# Patient Record
Sex: MALE | Race: WHITE | NOT HISPANIC OR LATINO | Employment: OTHER | URBAN - METROPOLITAN AREA
[De-identification: names, ages, dates, MRNs, and addresses within clinical notes are randomized per-mention and may not be internally consistent; named-entity substitution may affect disease eponyms.]

---

## 2019-03-14 ENCOUNTER — TELEPHONE (OUTPATIENT)
Dept: TRANSPLANT | Facility: CLINIC | Age: 34
End: 2019-03-14

## 2019-03-14 NOTE — TELEPHONE ENCOUNTER
"3/14/19  1200 pm:   Received the following message at 1149 today:  [facility] Staff              pt wife would like a call from Dr. Cevallos in ref togetting him an appt to see him. She says her son has the same disease as his father. [id number] please call her at [phone] Kristine     Thanks      Called Ms. Kristine Stephens  She told me her son is "suffering from the same disease my , his father had". She is asking that he be scheduled to see Dr. Cevallos.  She told me he had a heart attack in the past, had a stent put in his leg and  on the table. She said he was "shocked and brought back, and ever since has been having the same kind of heart problems my  had before he was transplanted and ".  She said most recently her son was told he needs to wear a vest for a while. Mother also stated Dr. Cevallos told her he would see her son any time needed.  She confirmed he has never been a pt at [facility] before. She gave me his name and . She told me he has Medicaid insurance.   I asked that she have some very specific records sent-did not need all of them. She then asked I call his wife, her dtr in law , Stefania because she has all of his records. She gave me the phone number for Stefania  I then called and spoke with her. She confirmed most of above-stating the "heart attack was 2018".  I asked and she told me he has been in the hospital a few times since then due to fluid; but now this is much better with medications.  She also said his cardiologist there wants hm to start wearing a life vest and if his echo improves, he will no longer have to wear it.  Asked if his Cardiologist was referring him and wife told me he said "not yet, was going to watch and see how he does for now".  However pt and she would like for him to be seen soon.    Asked her to obtain and send some specific medical records. Said she has "all of his records' asked for now, she " only have faxed: his recent echocariogram/EKG/Angiogram/ hospital D/C summary and labs. Provided her with this office phone and fax numbers as well as my name  Asked her to call me back one records have been faxed-said she would do so.  She told me local cardiologist is:  Donnell Ramos in [place].   She was also under the iimpression Dr. Ramos spoke about her  a few weeks ago. Told her I would ask Dr Cevallos about this.

## 2019-03-15 ENCOUNTER — HOSPITAL ENCOUNTER (INPATIENT)
Facility: HOSPITAL | Age: 34
LOS: 4 days | Discharge: HOME | DRG: 246 | End: 2019-03-19
Attending: EMERGENCY MEDICINE | Admitting: HOSPITALIST
Payer: MEDICAID

## 2019-03-15 DIAGNOSIS — I21.4 NSTEMI (NON-ST ELEVATED MYOCARDIAL INFARCTION): ICD-10-CM

## 2019-03-15 DIAGNOSIS — I50.43 ACUTE ON CHRONIC COMBINED SYSTOLIC AND DIASTOLIC CONGESTIVE HEART FAILURE: ICD-10-CM

## 2019-03-15 DIAGNOSIS — K21.9 GASTROESOPHAGEAL REFLUX DISEASE WITHOUT ESOPHAGITIS: ICD-10-CM

## 2019-03-15 DIAGNOSIS — I25.10 CORONARY ARTERY DISEASE INVOLVING NATIVE CORONARY ARTERY OF NATIVE HEART WITHOUT ANGINA PECTORIS: ICD-10-CM

## 2019-03-15 DIAGNOSIS — R07.9 CHEST PAIN: ICD-10-CM

## 2019-03-15 DIAGNOSIS — I50.9 CONGESTIVE HEART FAILURE, UNSPECIFIED HF CHRONICITY, UNSPECIFIED HEART FAILURE TYPE: Primary | ICD-10-CM

## 2019-03-15 DIAGNOSIS — I10 ESSENTIAL HYPERTENSION: ICD-10-CM

## 2019-03-15 LAB
ALBUMIN SERPL BCP-MCNC: 4.1 G/DL
ALP SERPL-CCNC: 82 U/L
ALT SERPL W/O P-5'-P-CCNC: 18 U/L
ANION GAP SERPL CALC-SCNC: 8 MMOL/L
APTT BLDCRRT: 24.7 SEC
ASCENDING AORTA: 3.14 CM
AST SERPL-CCNC: 14 U/L
AV INDEX (PROSTH): 0.73
AV MEAN GRADIENT: 3.45 MMHG
AV PEAK GRADIENT: 5.2 MMHG
AV VALVE AREA: 3.4 CM2
AV VELOCITY RATIO: 0.72
BASOPHILS # BLD AUTO: 0.09 K/UL
BASOPHILS NFR BLD: 1.2 %
BILIRUB SERPL-MCNC: 0.6 MG/DL
BNP SERPL-MCNC: 31 PG/ML
BSA FOR ECHO PROCEDURE: 2.04 M2
BUN SERPL-MCNC: 20 MG/DL
CALCIUM SERPL-MCNC: 9.6 MG/DL
CHLORIDE SERPL-SCNC: 104 MMOL/L
CHOLEST SERPL-MCNC: 124 MG/DL
CHOLEST/HDLC SERPL: 3.4 {RATIO}
CO2 SERPL-SCNC: 29 MMOL/L
CREAT SERPL-MCNC: 1.2 MG/DL
CV ECHO LV RWT: 0.35 CM
DIFFERENTIAL METHOD: NORMAL
DOP CALC AO PEAK VEL: 1.14 M/S
DOP CALC AO VTI: 19.51 CM
DOP CALC LVOT AREA: 4.64 CM2
DOP CALC LVOT DIAMETER: 2.43 CM
DOP CALC LVOT PEAK VEL: 0.83 M/S
DOP CALC LVOT STROKE VOLUME: 66.38 CM3
DOP CALCLVOT PEAK VEL VTI: 14.32 CM
E WAVE DECELERATION TIME: 146.49 MSEC
E/A RATIO: 0.78
E/E' RATIO: 4.67
ECHO LV POSTERIOR WALL: 0.9 CM (ref 0.6–1.1)
EOSINOPHIL # BLD AUTO: 0.5 K/UL
EOSINOPHIL NFR BLD: 6.1 %
ERYTHROCYTE [DISTWIDTH] IN BLOOD BY AUTOMATED COUNT: 11.9 %
EST. GFR  (AFRICAN AMERICAN): >60 ML/MIN/1.73 M^2
EST. GFR  (NON AFRICAN AMERICAN): >60 ML/MIN/1.73 M^2
ESTIMATED AVG GLUCOSE: 100 MG/DL
FRACTIONAL SHORTENING: 20 % (ref 28–44)
GLUCOSE SERPL-MCNC: 82 MG/DL
HBA1C MFR BLD HPLC: 5.1 %
HCT VFR BLD AUTO: 41.8 %
HDLC SERPL-MCNC: 36 MG/DL
HDLC SERPL: 29 %
HGB BLD-MCNC: 14.3 G/DL
IMM GRANULOCYTES # BLD AUTO: 0.02 K/UL
IMM GRANULOCYTES NFR BLD AUTO: 0.3 %
INTERVENTRICULAR SEPTUM: 0.91 CM (ref 0.6–1.1)
LA MAJOR: 4.24 CM
LA MINOR: 3.79 CM
LA WIDTH: 3.63 CM
LDLC SERPL CALC-MCNC: 45.8 MG/DL
LEFT ATRIUM SIZE: 3.79 CM
LEFT ATRIUM VOLUME INDEX: 23.1 ML/M2
LEFT ATRIUM VOLUME: 46.8 CM3
LEFT INTERNAL DIMENSION IN SYSTOLE: 4.11 CM (ref 2.1–4)
LEFT VENTRICLE DIASTOLIC VOLUME INDEX: 61.32 ML/M2
LEFT VENTRICLE DIASTOLIC VOLUME: 124.11 ML
LEFT VENTRICLE MASS INDEX: 81.7 G/M2
LEFT VENTRICLE SYSTOLIC VOLUME INDEX: 36.9 ML/M2
LEFT VENTRICLE SYSTOLIC VOLUME: 74.67 ML
LEFT VENTRICULAR INTERNAL DIMENSION IN DIASTOLE: 5.11 CM (ref 3.5–6)
LEFT VENTRICULAR MASS: 165.29 G
LV LATERAL E/E' RATIO: 3.5
LV SEPTAL E/E' RATIO: 7
LYMPHOCYTES # BLD AUTO: 2.3 K/UL
LYMPHOCYTES NFR BLD: 30.7 %
MAGNESIUM SERPL-MCNC: 2.2 MG/DL
MCH RBC QN AUTO: 30.4 PG
MCHC RBC AUTO-ENTMCNC: 34.2 G/DL
MCV RBC AUTO: 89 FL
MONOCYTES # BLD AUTO: 0.6 K/UL
MONOCYTES NFR BLD: 7.4 %
MV PEAK A VEL: 0.63 M/S
MV PEAK E VEL: 0.49 M/S
NEUTROPHILS # BLD AUTO: 4.1 K/UL
NEUTROPHILS NFR BLD: 54.3 %
NONHDLC SERPL-MCNC: 88 MG/DL
NRBC BLD-RTO: 0 /100 WBC
PISA TR MAX VEL: 1.97 M/S
PLATELET # BLD AUTO: 177 K/UL
PMV BLD AUTO: 10.4 FL
POTASSIUM SERPL-SCNC: 3.9 MMOL/L
PROT SERPL-MCNC: 7.2 G/DL
PULM VEIN S/D RATIO: 1.61
PV PEAK D VEL: 0.28 M/S
PV PEAK S VEL: 0.45 M/S
RA MAJOR: 3.73 CM
RA PRESSURE: 3 MMHG
RA WIDTH: 3.25 CM
RBC # BLD AUTO: 4.7 M/UL
RIGHT VENTRICULAR END-DIASTOLIC DIMENSION: 2.52 CM
RV TISSUE DOPPLER FREE WALL SYSTOLIC VELOCITY 1 (APICAL 4 CHAMBER VIEW): 10.73 M/S
SINUS: 3.05 CM
SODIUM SERPL-SCNC: 141 MMOL/L
STJ: 3.27 CM
TDI LATERAL: 0.14
TDI SEPTAL: 0.07
TDI: 0.11
TR MAX PG: 15.52 MMHG
TRICUSPID ANNULAR PLANE SYSTOLIC EXCURSION: 1.71 CM
TRIGL SERPL-MCNC: 211 MG/DL
TROPONIN I SERPL DL<=0.01 NG/ML-MCNC: 0.01 NG/ML
TROPONIN I SERPL DL<=0.01 NG/ML-MCNC: 0.02 NG/ML
TROPONIN I SERPL DL<=0.01 NG/ML-MCNC: 0.05 NG/ML
TV REST PULMONARY ARTERY PRESSURE: 19 MMHG
WBC # BLD AUTO: 7.59 K/UL

## 2019-03-15 PROCEDURE — 99285 EMERGENCY DEPT VISIT HI MDM: CPT | Mod: ,,, | Performed by: NURSE PRACTITIONER

## 2019-03-15 PROCEDURE — 84484 ASSAY OF TROPONIN QUANT: CPT

## 2019-03-15 PROCEDURE — 99220 PR INITIAL OBSERVATION CARE,LEVL III: CPT | Mod: ,,, | Performed by: NURSE PRACTITIONER

## 2019-03-15 PROCEDURE — 99285 EMERGENCY DEPT VISIT HI MDM: CPT | Mod: 25

## 2019-03-15 PROCEDURE — 36415 COLL VENOUS BLD VENIPUNCTURE: CPT

## 2019-03-15 PROCEDURE — 93005 ELECTROCARDIOGRAM TRACING: CPT

## 2019-03-15 PROCEDURE — 11000001 HC ACUTE MED/SURG PRIVATE ROOM

## 2019-03-15 PROCEDURE — 80061 LIPID PANEL: CPT

## 2019-03-15 PROCEDURE — 96360 HYDRATION IV INFUSION INIT: CPT

## 2019-03-15 PROCEDURE — 93010 ELECTROCARDIOGRAM REPORT: CPT | Mod: ,,, | Performed by: INTERNAL MEDICINE

## 2019-03-15 PROCEDURE — 80053 COMPREHEN METABOLIC PANEL: CPT

## 2019-03-15 PROCEDURE — 85025 COMPLETE CBC W/AUTO DIFF WBC: CPT

## 2019-03-15 PROCEDURE — 25000003 PHARM REV CODE 250: Performed by: NURSE PRACTITIONER

## 2019-03-15 PROCEDURE — 96361 HYDRATE IV INFUSION ADD-ON: CPT

## 2019-03-15 PROCEDURE — 99220 PR INITIAL OBSERVATION CARE,LEVL III: ICD-10-PCS | Mod: ,,, | Performed by: NURSE PRACTITIONER

## 2019-03-15 PROCEDURE — 63600175 PHARM REV CODE 636 W HCPCS: Performed by: NURSE PRACTITIONER

## 2019-03-15 PROCEDURE — 85730 THROMBOPLASTIN TIME PARTIAL: CPT

## 2019-03-15 PROCEDURE — 83880 ASSAY OF NATRIURETIC PEPTIDE: CPT

## 2019-03-15 PROCEDURE — 83036 HEMOGLOBIN GLYCOSYLATED A1C: CPT

## 2019-03-15 PROCEDURE — 84484 ASSAY OF TROPONIN QUANT: CPT | Mod: 91

## 2019-03-15 PROCEDURE — 83735 ASSAY OF MAGNESIUM: CPT

## 2019-03-15 PROCEDURE — 99285 PR EMERGENCY DEPT VISIT,LEVEL V: ICD-10-PCS | Mod: ,,, | Performed by: NURSE PRACTITIONER

## 2019-03-15 PROCEDURE — 93010 EKG 12-LEAD: ICD-10-PCS | Mod: ,,, | Performed by: INTERNAL MEDICINE

## 2019-03-15 RX ORDER — CETIRIZINE HYDROCHLORIDE 5 MG/1
10 TABLET ORAL DAILY
Status: DISCONTINUED | OUTPATIENT
Start: 2019-03-16 | End: 2019-03-19 | Stop reason: HOSPADM

## 2019-03-15 RX ORDER — PRASUGREL 10 MG/1
60 TABLET, FILM COATED ORAL ONCE
Status: COMPLETED | OUTPATIENT
Start: 2019-03-15 | End: 2019-03-15

## 2019-03-15 RX ORDER — BUMETANIDE 1 MG/1
2 TABLET ORAL DAILY
Status: DISCONTINUED | OUTPATIENT
Start: 2019-03-16 | End: 2019-03-19 | Stop reason: HOSPADM

## 2019-03-15 RX ORDER — ASPIRIN 325 MG
325 TABLET ORAL DAILY
Status: DISCONTINUED | OUTPATIENT
Start: 2019-03-16 | End: 2019-03-19 | Stop reason: HOSPADM

## 2019-03-15 RX ORDER — ASPIRIN 325 MG
325 TABLET ORAL DAILY
COMMUNITY
End: 2019-03-20 | Stop reason: ALTCHOICE

## 2019-03-15 RX ORDER — POLYETHYLENE GLYCOL 3350 17 G/17G
17 POWDER, FOR SOLUTION ORAL 2 TIMES DAILY PRN
Status: DISCONTINUED | OUTPATIENT
Start: 2019-03-15 | End: 2019-03-19 | Stop reason: HOSPADM

## 2019-03-15 RX ORDER — SODIUM CHLORIDE 0.9 % (FLUSH) 0.9 %
3 SYRINGE (ML) INJECTION EVERY 8 HOURS
Status: DISCONTINUED | OUTPATIENT
Start: 2019-03-15 | End: 2019-03-19 | Stop reason: HOSPADM

## 2019-03-15 RX ORDER — BUMETANIDE 2 MG/1
2 TABLET ORAL
COMMUNITY
End: 2019-05-08 | Stop reason: SDUPTHER

## 2019-03-15 RX ORDER — PRASUGREL 10 MG/1
10 TABLET, FILM COATED ORAL DAILY
Status: DISCONTINUED | OUTPATIENT
Start: 2019-03-16 | End: 2019-03-19 | Stop reason: HOSPADM

## 2019-03-15 RX ORDER — NITROGLYCERIN 0.4 MG/1
0.4 TABLET SUBLINGUAL EVERY 5 MIN PRN
Status: DISCONTINUED | OUTPATIENT
Start: 2019-03-15 | End: 2019-03-19 | Stop reason: HOSPADM

## 2019-03-15 RX ORDER — PANTOPRAZOLE SODIUM 20 MG/1
20 TABLET, DELAYED RELEASE ORAL DAILY
COMMUNITY
End: 2020-06-02

## 2019-03-15 RX ORDER — ACETAMINOPHEN 325 MG/1
650 TABLET ORAL EVERY 4 HOURS PRN
Status: DISCONTINUED | OUTPATIENT
Start: 2019-03-15 | End: 2019-03-19 | Stop reason: HOSPADM

## 2019-03-15 RX ORDER — ASPIRIN 325 MG
325 TABLET ORAL
Status: DISCONTINUED | OUTPATIENT
Start: 2019-03-15 | End: 2019-03-15

## 2019-03-15 RX ORDER — SODIUM CHLORIDE 0.9 % (FLUSH) 0.9 %
5 SYRINGE (ML) INJECTION
Status: DISCONTINUED | OUTPATIENT
Start: 2019-03-15 | End: 2019-03-19 | Stop reason: HOSPADM

## 2019-03-15 RX ORDER — ATORVASTATIN CALCIUM 20 MG/1
40 TABLET, FILM COATED ORAL DAILY
Status: DISCONTINUED | OUTPATIENT
Start: 2019-03-16 | End: 2019-03-19 | Stop reason: HOSPADM

## 2019-03-15 RX ORDER — RAMELTEON 8 MG/1
8 TABLET ORAL NIGHTLY PRN
Status: DISCONTINUED | OUTPATIENT
Start: 2019-03-15 | End: 2019-03-19 | Stop reason: HOSPADM

## 2019-03-15 RX ORDER — CARVEDILOL 3.12 MG/1
3.12 TABLET ORAL 2 TIMES DAILY WITH MEALS
COMMUNITY
End: 2019-03-20 | Stop reason: ALTCHOICE

## 2019-03-15 RX ORDER — ATORVASTATIN CALCIUM 40 MG/1
40 TABLET, FILM COATED ORAL DAILY
COMMUNITY
End: 2019-03-20 | Stop reason: SDUPTHER

## 2019-03-15 RX ORDER — ONDANSETRON 2 MG/ML
4 INJECTION INTRAMUSCULAR; INTRAVENOUS EVERY 8 HOURS PRN
Status: DISCONTINUED | OUTPATIENT
Start: 2019-03-15 | End: 2019-03-19 | Stop reason: HOSPADM

## 2019-03-15 RX ORDER — HEPARIN SODIUM,PORCINE/D5W 25000/250
12 INTRAVENOUS SOLUTION INTRAVENOUS CONTINUOUS
Status: DISCONTINUED | OUTPATIENT
Start: 2019-03-15 | End: 2019-03-18

## 2019-03-15 RX ORDER — TRAMADOL HYDROCHLORIDE 50 MG/1
50 TABLET ORAL EVERY 6 HOURS PRN
Status: DISCONTINUED | OUTPATIENT
Start: 2019-03-15 | End: 2019-03-19 | Stop reason: HOSPADM

## 2019-03-15 RX ORDER — CETIRIZINE HYDROCHLORIDE 10 MG/1
10 TABLET ORAL DAILY
COMMUNITY

## 2019-03-15 RX ORDER — CARVEDILOL 3.12 MG/1
3.12 TABLET ORAL 2 TIMES DAILY WITH MEALS
Status: DISCONTINUED | OUTPATIENT
Start: 2019-03-15 | End: 2019-03-19 | Stop reason: HOSPADM

## 2019-03-15 RX ORDER — PANTOPRAZOLE SODIUM 20 MG/1
20 TABLET, DELAYED RELEASE ORAL DAILY
Status: DISCONTINUED | OUTPATIENT
Start: 2019-03-16 | End: 2019-03-19 | Stop reason: HOSPADM

## 2019-03-15 RX ORDER — BISACODYL 10 MG
10 SUPPOSITORY, RECTAL RECTAL DAILY PRN
Status: DISCONTINUED | OUTPATIENT
Start: 2019-03-15 | End: 2019-03-19 | Stop reason: HOSPADM

## 2019-03-15 RX ORDER — AMOXICILLIN 250 MG
1 CAPSULE ORAL 2 TIMES DAILY
Status: DISCONTINUED | OUTPATIENT
Start: 2019-03-15 | End: 2019-03-19 | Stop reason: HOSPADM

## 2019-03-15 RX ORDER — ONDANSETRON 8 MG/1
8 TABLET, ORALLY DISINTEGRATING ORAL EVERY 8 HOURS PRN
Status: DISCONTINUED | OUTPATIENT
Start: 2019-03-15 | End: 2019-03-19 | Stop reason: HOSPADM

## 2019-03-15 RX ORDER — PRASUGREL 10 MG/1
10 TABLET, FILM COATED ORAL DAILY
COMMUNITY
End: 2022-01-07

## 2019-03-15 RX ADMIN — PRASUGREL 60 MG: 10 TABLET, FILM COATED ORAL at 05:03

## 2019-03-15 RX ADMIN — SACUBITRIL AND VALSARTAN 1 TABLET: 49; 51 TABLET, FILM COATED ORAL at 08:03

## 2019-03-15 RX ADMIN — CARVEDILOL 3.12 MG: 3.12 TABLET, FILM COATED ORAL at 05:03

## 2019-03-15 RX ADMIN — SODIUM CHLORIDE 500 ML: 0.9 INJECTION, SOLUTION INTRAVENOUS at 12:03

## 2019-03-15 RX ADMIN — STANDARDIZED SENNA CONCENTRATE AND DOCUSATE SODIUM 1 TABLET: 8.6; 5 TABLET, FILM COATED ORAL at 08:03

## 2019-03-15 RX ADMIN — HEPARIN SODIUM AND DEXTROSE 12 UNITS/KG/HR: 10000; 5 INJECTION INTRAVENOUS at 06:03

## 2019-03-15 NOTE — HPI
"Mr. Stephens is a 33 year old male with past medical history of CHF (on Bumex), CAD, GERD, hypertension and MI (12/30/18) who presented to the Emergency Department for chest pain, he reports pain started last night while driving and felt like a pressure in his chest; stating "like someone was sitting on me".  He denied radiation of the pain and reports it can be as bad as 10/10 but at time of presentation is 5/10. He notes that is anginal equivalent is crushing chest pain substantially more severe than what he reports today. He denies taking any medications for the pain. He denies any headache, shortness of breath, fever or chills.  Pt states he is compliant with all medications and took his home  mg PTA. He reports prior MI in December was treated with one "right sided stent" which was placed at [facility]. He is independent in ADLs, employed, and lives outside of [place]. He denies tobacco or illicit drug use, social ETOH. He has a strong family history of cardiac disease, father had severe CHF and required heart transplant. Mr. Stephens reports his mother began reaching out on his behalf to be see in [facility] yesterday, leaving a message with their staff, however has not heard back about the referral as of yet. All past medical, social, and family history reviewed.     In the ED, CBC and chemistry unremarkable, BNP WNL, no evidence of volume overload on physical exam, troponin negative, CXR without acute findings, and EKG with NSR, left axis deviation, and inferolateral infarct (no priors for comparison). The patient was placed in observation to the Hospital Medicine Service for further evaluation and treatment.   "

## 2019-03-15 NOTE — ED NOTES
Patient transported on in wheelchair to room OBS 06A via escort with all telemetry, family and belongings. Patient AAOx4, VSS,  no acute distress reported or observed at time of departure from ED.

## 2019-03-15 NOTE — H&P
"[facility] Hospital Medicine  History & Physical    Patient Name: Juan Stephens  MRN: [id number]  Admission Date: 3/15/2019  Attending Physician: Get Arellano, *   Primary Care Provider: Sanjuanita Smith MD    [facility] Medicine Team: [facility]OS[facility] MED MARY JO Escamilla NP     Patient information was obtained from patient, relative(s), past medical records and ER records.     Subjective:     Principal Problem:Chest pain    Chief Complaint:   Chief Complaint   Patient presents with    Chest Pain     hx stents        HPI: Mr. Stephens is a 33 year old male with past medical history of CHF (on Bumex), CAD, GERD, hypertension and MI (12/30/18) who presented to the Emergency Department for chest pain, he reports pain started last night while driving and felt like a pressure in his chest; stating "like someone was sitting on me".  He denied radiation of the pain and reports it can be as bad as 10/10 but at time of presentation is 5/10. He notes that is anginal equivalent is crushing chest pain substantially more severe than what he reports today. He denies taking any medications for the pain. He denies any headache, shortness of breath, fever or chills.  Pt states he is compliant with all medications and took his home  mg PTA. He reports prior MI in December was treated with one "right sided stent" which was placed at [facility]. He is independent in ADLs, employed, and lives outside of [place]. He denies tobacco or illicit drug use, social ETOH. He has a strong family history of cardiac disease, father had severe CHF and required heart transplant. Mr. Stephens reports his mother began reaching out on his behalf to be see in [facility] clinic yesterday, leaving a message with their staff, however has not heard back about the referral as of yet. All past medical, social, and family history reviewed.     In the ED, CBC and chemistry unremarkable, BNP WNL, no evidence of " volume overload on physical exam, troponin negative, CXR without acute findings, and EKG with NSR, left axis deviation, and inferolateral infarct (no priors for comparison). The patient was placed in observation to the Hospital Medicine Service for further evaluation and treatment.     Past Medical History:   Diagnosis Date    CHF (congestive heart failure)     Coronary artery disease     GERD (gastroesophageal reflux disease)     Hypertension        Past Surgical History:   Procedure Laterality Date    HERNIA REPAIR         Review of patient's allergies indicates:  No Known Allergies    No current facility-administered medications on file prior to encounter.      Current Outpatient Medications on File Prior to Encounter   Medication Sig    aspirin 325 MG tablet Take 325 mg by mouth once daily.    atorvastatin (LIPITOR) 40 MG tablet Take 40 mg by mouth once daily.    bumetanide (BUMEX) 2 MG tablet Take 2 mg by mouth once daily.    carvedilol (COREG) 3.125 MG tablet Take 3.125 mg by mouth 2 (two) times daily with meals.    cetirizine (ZYRTEC) 10 MG tablet Take 10 mg by mouth once daily.    pantoprazole (PROTONIX) 20 MG tablet Take 20 mg by mouth once daily.    prasugrel (EFFIENT) 10 mg Tab Take 10 mg by mouth once daily.    sacubitril-valsartan (ENTRESTO) 49-51 mg per tablet Take 1 tablet by mouth 2 (two) times daily.     Family History     Problem Relation (Age of Onset)    Heart failure Father        Tobacco Use    Smoking status: Former Smoker     Types: Cigarettes     Last attempt to quit: 2018     Years since quittin.2    Smokeless tobacco: Current User     Types: Snuff   Substance and Sexual Activity    Alcohol use: Not on file     Comment: occasionally    Drug use: No    Sexual activity: Yes     Partners: Female     Review of Systems   Constitutional: Negative for activity change, appetite change, chills, diaphoresis, fatigue, fever and unexpected weight change.   HENT: Negative for  congestion, sinus pressure, sinus pain, sneezing, sore throat, trouble swallowing and voice change.    Respiratory: Positive for shortness of breath (now resolved). Negative for cough, chest tightness, wheezing and stridor.    Cardiovascular: Positive for chest pain (now reoslved ).   Gastrointestinal: Negative for abdominal distention, abdominal pain, constipation, diarrhea, nausea and vomiting.   Genitourinary: Negative for decreased urine volume, difficulty urinating, hematuria and urgency.   Musculoskeletal: Negative for arthralgias, back pain, gait problem, joint swelling, myalgias and neck pain.   Skin: Negative for color change, pallor, rash and wound.   Neurological: Negative for dizziness, syncope, weakness and light-headedness.     Objective:     Vital Signs (Most Recent):  Temp: 99 °F (37.2 °C) (03/15/19 1615)  Pulse: 87 (03/15/19 1615)  Resp: 18 (03/15/19 1615)  BP: 103/61 (03/15/19 1615)  SpO2: 99 % (03/15/19 1415) Vital Signs (24h Range):  Temp:  [98.1 °F (36.7 °C)-99 °F (37.2 °C)] 99 °F (37.2 °C)  Pulse:  [] 87  Resp:  [18-20] 18  SpO2:  [97 %-99 %] 99 %  BP: ()/(60-71) 103/61     Weight: 88.9 kg (196 lb)  Body mass index is 28.12 kg/m².    Physical Exam   Constitutional: He is oriented to person, place, and time. He appears well-developed and well-nourished. No distress.   HENT:   Head: Normocephalic and atraumatic.   Mouth/Throat: Mucous membranes are normal. Normal dentition. No oropharyngeal exudate.   Eyes: Conjunctivae and lids are normal. Pupils are equal, round, and reactive to light. No scleral icterus.   Neck: Normal range of motion. Neck supple. No JVD present.   Cardiovascular: Normal rate, regular rhythm, normal heart sounds and intact distal pulses. Exam reveals no gallop and no friction rub.   No murmur heard.  Pulmonary/Chest: Effort normal and breath sounds normal. No respiratory distress. He has no wheezes. He exhibits no tenderness.   Abdominal: Soft. Bowel sounds are  normal. He exhibits no distension. There is no tenderness.   Musculoskeletal: Normal range of motion. He exhibits no edema, tenderness or deformity.   Neurological: He is alert and oriented to person, place, and time. No cranial nerve deficit.   Skin: Skin is warm and dry. Capillary refill takes less than 2 seconds. No rash noted. He is not diaphoretic. No erythema.   Psychiatric: He has a normal mood and affect. Judgment and thought content normal.   Nursing note and vitals reviewed.        CRANIAL NERVES     CN III, IV, VI   Pupils are equal, round, and reactive to light.    Significant Labs:   CBC:   Recent Labs   Lab 03/15/19  1224   WBC 7.59   HGB 14.3   HCT 41.8        CMP:   Recent Labs   Lab 03/15/19  1224      K 3.9      CO2 29   GLU 82   BUN 20   CREATININE 1.2   CALCIUM 9.6   PROT 7.2   ALBUMIN 4.1   BILITOT 0.6   ALKPHOS 82   AST 14   ALT 18   ANIONGAP 8   EGFRNONAA >60.0     Cardiac Markers:   Recent Labs   Lab 03/15/19  1224   BNP 31     Magnesium:   Recent Labs   Lab 03/15/19  1224   MG 2.2     Troponin:   Recent Labs   Lab 03/15/19  1224 03/15/19  1543   TROPONINI 0.020 0.050*     All pertinent labs within the past 24 hours have been reviewed.    Significant Imaging: I have reviewed all pertinent imaging results/findings within the past 24 hours.    Assessment/Plan:     * Chest pain    -placed in observation 3/15 due to chest pain  -in the ED, CBC and chemistry unremarkable, BNP WNL, no evidence of volume overload on physical exam, troponin negative, CXR without acute findings, and EKG with NSR, left axis deviation, and inferolateral infarct (no priors for comparison)  -chest pain free at current  -troponin trend 0.020 >> 0.050   -discussed with ED Cards fellow >> ACS protocol initiated  -continue ASA, statin, carvedilol, and prasugrel (loaded) with heparin gtt  -TTE pending  -continue tele monitoring  -EKG and SL NTG PRN chest pain  -cardiac diet in house  -Cardiac Rehab referral    -outpt follow up with Cardiology at [place]     Coronary artery disease involving native coronary artery of native heart without angina pectoris    -see above and HPI     Acute on chronic congestive heart failure    -euvolemic on exam  -CXR and BNP unremarkable  -continue home bumex, carvedilol, and entresto  -continue tele monitoring  -cardiac diet   -strict I&Os and daily weights     Essential hypertension    -BP controlled, continue medications as above  -monitor      GERD (gastroesophageal reflux disease)    -no acute issues, chronic  -continue home PPI       VTE Risk Mitigation (From admission, onward)        Ordered     heparin 25,000 units in dextrose 5% (100 units/ml) IV bolus from bag INITIAL BOLUS (max bolus 4000 units)  Once      03/15/19 1642     heparin 25,000 units in dextrose 5% 250 mL (100 units/mL) infusion LOW INTENSITY nomogram - [facility]  Continuous      03/15/19 1642     heparin 25,000 units in dextrose 5% (100 units/ml) IV bolus from bag - ADDITIONAL PRN BOLUS - 60 units/kg (max bolus 4000 units)  As needed (PRN)      03/15/19 1642     heparin 25,000 units in dextrose 5% (100 units/ml) IV bolus from bag - ADDITIONAL PRN BOLUS - 30 units/kg (max bolus 4000 units)  As needed (PRN)      03/15/19 1642     IP VTE HIGH RISK PATIENT  Once      03/15/19 1525     Place sequential compression device  Until discontinued      03/15/19 1525     Reason for No Pharmacological VTE Prophylaxis  Once      03/15/19 1525          Dolores Escamilla, DNP, AG-ACNP, BC  Department of Hospital Medicine  [facility]  Pager [phone]  [facility] [phone]

## 2019-03-15 NOTE — Clinical Note
Catheter  proximal first diagonal artery. The vessel(s) were injected and visualized post intervention.

## 2019-03-15 NOTE — Clinical Note
160 ml injected throughout the case. 40 mL total wasted during the case. 200 mL total used in the case.

## 2019-03-15 NOTE — CONSULTS
[facility]  Cardiology  Consult Note    Patient Name: Juan Stephens  MRN: [id number]  Admission Date: 3/15/2019  Hospital Length of Stay: 0 days  Code Status: Full Code   Attending Provider: Get Arellano, *   Consulting Provider: Michelle Astudillo MD  Primary Care Physician: Sanjuanita Smith MD  Principal Problem:Chest pain    Patient information was obtained from patient and ER records.     Consults  Subjective:     Chief Complaint:  Chest pain     HPI:   33 year old male here for chest pain.   Patient has PMH significant for HTN, HLD, and recent inferior MI S/P PCI 12/2018 at [place] and ischemic CMP.   Patient presenting to the ER with chest pain episode yesterday while driving, lasted for 3 hours, didn't try NTG. Reported chest pain is different from the chest pain he had with his prior MI.   Patient father is a heart transplant recepient [facility] at [facility], was told by his mother to come to [facility] to come for evaluation by Dr Cevallos, however he never saw Dr Cevallos prior.  In the ER he is chest pain free.  Troponin negative, EKG with inferior Q waves.       Past Medical History:   Diagnosis Date    CHF (congestive heart failure)     Coronary artery disease     GERD (gastroesophageal reflux disease)     Hypertension        Past Surgical History:   Procedure Laterality Date    HERNIA REPAIR         Review of patient's allergies indicates:  No Known Allergies    No current facility-administered medications on file prior to encounter.      Current Outpatient Medications on File Prior to Encounter   Medication Sig    aspirin 325 MG tablet Take 325 mg by mouth once daily.    atorvastatin (LIPITOR) 40 MG tablet Take 40 mg by mouth once daily.    bumetanide (BUMEX) 2 MG tablet Take 2 mg by mouth once daily.    carvedilol (COREG) 3.125 MG tablet Take 3.125 mg by mouth 2 (two) times daily with meals.    cetirizine (ZYRTEC) 10 MG tablet Take 10 mg by mouth once daily.     pantoprazole (PROTONIX) 20 MG tablet Take 20 mg by mouth once daily.    prasugrel (EFFIENT) 10 mg Tab Take 10 mg by mouth once daily.    sacubitril-valsartan (ENTRESTO) 49-51 mg per tablet Take 1 tablet by mouth 2 (two) times daily.     Family History     Problem Relation (Age of Onset)    Heart failure Father        Tobacco Use    Smoking status: Former Smoker     Types: Cigarettes     Last attempt to quit: 2018     Years since quittin.2    Smokeless tobacco: Current User     Types: Snuff   Substance and Sexual Activity    Alcohol use: Not on file     Comment: occasionally    Drug use: No    Sexual activity: Yes     Partners: Female     Review of Systems   Constitution: Negative for decreased appetite, weight gain and weight loss.   HENT: Negative.    Eyes: Negative.    Cardiovascular: Positive for chest pain. Negative for dyspnea on exertion, irregular heartbeat, orthopnea, paroxysmal nocturnal dyspnea and syncope.   Respiratory: Negative.  Negative for cough.    Gastrointestinal: Negative for bloating, melena, nausea and vomiting.   Genitourinary: Negative.    Neurological: Negative.      Objective:     Vital Signs (Most Recent):  Temp: 98.1 °F (36.7 °C) (03/15/19 1123)  Pulse: 86 (03/15/19 1415)  Resp: 20 (03/15/19 1415)  BP: 107/68 (03/15/19 1415)  SpO2: 99 % (03/15/19 1415) Vital Signs (24h Range):  Temp:  [98.1 °F (36.7 °C)] 98.1 °F (36.7 °C)  Pulse:  [] 86  Resp:  [18-20] 20  SpO2:  [97 %-99 %] 99 %  BP: ()/(60-71) 107/68     Weight: 88.9 kg (196 lb)  Body mass index is 28.12 kg/m².    SpO2: 99 %         Intake/Output Summary (Last 24 hours) at 3/15/2019 1615  Last data filed at 3/15/2019 1432  Gross per 24 hour   Intake 500 ml   Output 1900 ml   Net -1400 ml       Lines/Drains/Airways     Peripheral Intravenous Line                 Peripheral IV - Single Lumen 03/15/19 1213 Left Forearm less than 1 day                Physical Exam   Constitutional: He appears well-developed and  well-nourished. No distress.   HENT:   Head: Normocephalic.   Eyes: Pupils are equal, round, and reactive to light.   Neck: Normal range of motion. Neck supple. No JVD present.   Cardiovascular: Normal rate, regular rhythm, normal heart sounds and intact distal pulses. Exam reveals no gallop and no friction rub.   No murmur heard.  Pulmonary/Chest: Effort normal and breath sounds normal. He has no rales.   Abdominal: Soft. Bowel sounds are normal.   Musculoskeletal: Normal range of motion. He exhibits no edema.   Neurological: He is alert.   Skin: Skin is warm. He is not diaphoretic.       Assessment and Plan:     * Chest pain    -Episode of chest pain lasted for 3 hours yesterday while driving, and resolved spontaneously.   -Chest pain free in the ER.   -Troponin negative, EKG with old inferior infarct.     Recommendations:  -Place in observation.   -Trend troponin and EKG.  -Stress test prior to discharge, ideally nuclear stress test however won't be able to preform nuclear stress test over the weekend.   -Continue with ASA, Prasugrel, Statins.   -Continue with heart failure medications. He is well compensated.          VTE Risk Mitigation (From admission, onward)        Ordered     IP VTE HIGH RISK PATIENT  Once      03/15/19 1525     Place sequential compression device  Until discontinued      03/15/19 1525     Reason for No Pharmacological VTE Prophylaxis  Once      03/15/19 1525          Thank you for your consult. I will sign off. Please contact us if you have any additional questions.    Michelle Astudillo MD  Cardiology   [facility]

## 2019-03-15 NOTE — HPI
33 year old male here for chest pain.   Patient has PMH significant for HTN, HLD, and recent inferior MI S/P PCI 12/2018 at [place] and ischemic CMP.   Patient presenting to the ER with chest pain episode yesterday while driving, lasted for 3 hours, didn't try NTG. Reported chest pain is different from the chest pain he had with his prior MI.   Patient father is a heart transplant recepient here at [facility], was told by his mother to come to [facility] to come for evaluation by Dr Cevallos, however he never saw Dr Cevallos prior.  In the ER he is chest pain free.  Troponin negative, EKG with inferior Q waves.

## 2019-03-15 NOTE — ED NOTES
Hourly rounding performed at this time. Family at bedside.  Patient is in bed awake and alert, resting. No pain, acute distress or discomfort reported or observed. Patient able to reposition independently and urinal at bedside for toileting.  Discussed care plan, patient denies any additional needs at this time and has no complaints or questions. Room assessed for safety measures and cleanliness, no action needed at this time. The bed is in low, locked position with side rails up x 2. Personal belongings and call light in reach. Patient is oriented to call light use. Patient verbalizes will call nurse if assistance is needed.

## 2019-03-15 NOTE — ED TRIAGE NOTES
Juan Stephens, a 33 y.o. male presents to the ED w/ complaint of CP. Hx CHF and stent placement  Triage note:  Chief Complaint   Patient presents with    Chest Pain     hx stents     Review of patient's allergies indicates:  No Known Allergies  Past Medical History:   Diagnosis Date    CHF (congestive heart failure)     Coronary artery disease     GERD (gastroesophageal reflux disease)     Hypertension      Patient's name and date of birth checked and is correct.    Family is present     Pain: denies     LOC: The patient is awake, alert, and oriented to person, place, time, situation. Aware of environment with an appropriate affect. Speech is appropriate and clear.      APPEARANCE: Patient appears clean, well groomed, with clothing appropriate to environment.    Psychosocial:  Patient is calm and cooperative.  Patients insight and judgement are appropriate to situation. No evidence of delusions, hallucinations, or psychosis.     SKIN: The skin is clean, warm, dry, intact and color consistent with ethnicity. Patient has normal skin turgor and moist mucus membranes. No open areas, skin breakdown or bruising noted. Capillary refill < 3 seconds.      MUSCULOSKELETAL: Patient moving upper and lower extremities without difficulty. No obvious swelling, weakness or deformities observed or reported.     RESPIRATORY: Airway is open. Respirations spontaneous, even, and non-labored, with normal effort and rate. No accessory muscle use observed. Denies cough.  BS clear     CARDIAC:  Reports chest pain is more of a pressure now 5/10. Patient has a normal rate and rhythm, BLE periphreal edema observed, peripheral pulses 2+, capillary refill < 3 seconds.    ABDOMEN: Soft, non-tender and no distention noted.      NEUROLOGIC: Eyes open spontaneously.  Behavior appropriate to situation.  Follows commands; facial expression symmetrical.  Purposeful motor response noted; normal sensation in all extremities when touched with a  finger.      URINARY:  Patient reports routine urination without pain, frequency, or urgency.  Voids independently.

## 2019-03-15 NOTE — ASSESSMENT & PLAN NOTE
-Episode of chest pain lasted for 3 hours yesterday while driving, and resolved spontaneously.   -Chest pain free in the ER.   -Troponin negative, EKG with old inferior infarct.     Recommendations:  -Place in observation.   -Trend troponin and EKG.  -Stress test prior to discharge, ideally nuclear stress test however won't be able to preform nuclear stress test over the weekend.   -Continue with ASA, Prasugrel, Statins.   -Continue with heart failure medications. He is well compensated.

## 2019-03-15 NOTE — ED PROVIDER NOTES
Encounter Date: 3/15/2019       History     Chief Complaint   Patient presents with    Chest Pain     hx stents     Patient is a 33-year-old male with medical history of CHF (on Bumex), CAD, GERD, hypertension and MI (16) presenting to the ED for chest pain.  Pt states pain started last night when he was driving.  Pt states pain felt like a pressure in his chest.  Pt denies taking any medications to relieve his chest pain.  Pt denies radiation of chest pain.  Pt states pain today is 5/10.  Pt denies any headache, shortness of breath, fever or chills.  Pt states he is compliant with all medications.  Pt states he took 325mg ASA this morning prior to coming to the ED.        The history is provided by the patient and a parent.     Review of patient's allergies indicates:  No Known Allergies  Past Medical History:   Diagnosis Date    CHF (congestive heart failure)     Coronary artery disease     GERD (gastroesophageal reflux disease)     Hypertension      Past Surgical History:   Procedure Laterality Date    HERNIA REPAIR       Family History   Problem Relation Age of Onset    Heart failure Father      Social History     Tobacco Use    Smoking status: Former Smoker     Types: Cigarettes     Last attempt to quit: 2018     Years since quittin.2    Smokeless tobacco: Current User     Types: Snuff   Substance Use Topics    Alcohol use: Not on file     Comment: occasionally    Drug use: No     Review of Systems   Constitutional: Negative for activity change, appetite change, chills, diaphoresis, fatigue and fever.   HENT: Negative for congestion, postnasal drip, sinus pressure, sinus pain, sore throat and trouble swallowing.    Eyes: Negative for photophobia and visual disturbance.   Respiratory: Positive for chest tightness. Negative for cough, shortness of breath and wheezing.    Cardiovascular: Positive for chest pain. Negative for palpitations and leg swelling.   Gastrointestinal: Negative for  abdominal pain, constipation, diarrhea, nausea and vomiting.   Genitourinary: Negative for decreased urine volume, difficulty urinating, dysuria, hematuria and urgency.   Musculoskeletal: Negative for arthralgias, back pain, gait problem and myalgias.   Skin: Negative for color change, pallor, rash and wound.   Neurological: Negative for dizziness, syncope, weakness, numbness and headaches.   Hematological: Does not bruise/bleed easily.   All other systems reviewed and are negative.      Physical Exam     Initial Vitals [03/15/19 1123]   BP Pulse Resp Temp SpO2   118/71 102 18 98.1 °F (36.7 °C) 98 %      MAP       --         Physical Exam    Nursing note and vitals reviewed.  Constitutional: Vital signs are normal. He appears well-developed and well-nourished. He is cooperative. He does not have a sickly appearance. He does not appear ill. No distress.   HENT:   Head: Normocephalic and atraumatic.   Nose: Nose normal. Right sinus exhibits no maxillary sinus tenderness and no frontal sinus tenderness. Left sinus exhibits no maxillary sinus tenderness and no frontal sinus tenderness.   Mouth/Throat: Uvula is midline, oropharynx is clear and moist and mucous membranes are normal.   Eyes: Conjunctivae, EOM and lids are normal. Pupils are equal, round, and reactive to light.   Neck: Trachea normal, normal range of motion, full passive range of motion without pain and phonation normal. Neck supple. No spinous process tenderness and no muscular tenderness present. No JVD present.   Cardiovascular: Normal rate and regular rhythm.   Pulses:       Radial pulses are 2+ on the right side, and 2+ on the left side.        Dorsalis pedis pulses are 2+ on the right side, and 2+ on the left side.   Pulmonary/Chest: Effort normal and breath sounds normal.   Abdominal: Soft. Normal appearance and bowel sounds are normal. He exhibits no distension. There is no tenderness. There is no rigidity, no rebound and no guarding.    Musculoskeletal: Normal range of motion.   Neurological: He is alert and oriented to person, place, and time. He has normal strength. No cranial nerve deficit or sensory deficit. He displays a negative Romberg sign. GCS eye subscore is 4. GCS verbal subscore is 5. GCS motor subscore is 6.   Skin: Skin is warm, dry and intact. Capillary refill takes less than 2 seconds. No abrasion, no laceration and no rash noted. No cyanosis. Nails show no clubbing.         ED Course   Procedures  Labs Reviewed   CBC W/ AUTO DIFFERENTIAL   COMPREHENSIVE METABOLIC PANEL   TROPONIN I   B-TYPE NATRIURETIC PEPTIDE        ECG Results          EKG 12-lead (In process)  Result time 03/15/19 11:34:54    In process by Interface, Lab In [facility] (03/15/19 11:34:54)                 Narrative:    Test Reason : R07.9,    Vent. Rate : 100 BPM     Atrial Rate : 100 BPM     P-R Int : 170 ms          QRS Dur : 094 ms      QT Int : 334 ms       P-R-T Axes : 050 -34 -51 degrees     QTc Int : 430 ms    Normal sinus rhythm  Left axis deviation  Possible Lateral infarct ,age undetermined  Inferior infarct ,age undetermined  Abnormal ECG  No previous ECGs available    Referred By:             Confirmed By:                             Imaging Results          X-Ray Chest PA And Lateral (Final result)  Result time 03/15/19 12:34:17    Final result by Jason Padilla MD (03/15/19 12:34:17)                 Impression:      No acute finding.      Electronically signed by: Jason Padilla MD  Date:    03/15/2019  Time:    12:34             Narrative:    EXAMINATION:  XR CHEST PA AND LATERAL    CLINICAL HISTORY:  Chest Pain;    TECHNIQUE:  PA and lateral views of the chest were performed.    COMPARISON:  None    FINDINGS:  Heart size is within normal limits.  Lungs are slightly under expanded. No acute infiltrates, atelectasis or pneumothorax is seen.  Monitor wires superimpose the chest.  Bones appear intact.                                 Medical Decision  Making:   History:   Old Medical Records: I decided to obtain old medical records.  Initial Assessment:   Emergent evaluation of a 32 yo male patient presenting to the ER with chief complaint of mid substernal nonradiating chest pain.  Patient states pain feels like a pressure.  Patient states pain started last night when he was driving.  Patient states pain 10/10 at its worst but is currently 5/10.  Patient denies any increased pain with inspiration or movement.  On exam patient A&O x3. Pupils equal round reactive 3-2 mm.  No JVD noted. Breath sounds clear bilaterally. No tenderness to palpation of chest wall.  Abdomen soft and nontender. No pedal edema noted.  Cap refill less than 2 sec.  Strength 5/5 in all extremities.  Differential Diagnosis:   Differential diagnoses include but are not limited to ischemic chest pain (STEMI, NonSTEMI, or unstable angina), pulmonary embolism, aortic dissection, pericarditis, chest wall pain (rib strain, muscle strain, shingles), pneumonia, esophageal rupture, referred pain from the abdomen (biliary colic, cholecystitis, gastritis, gas pains, pancreatitis), anxiety or other causes of chest pain (GERD, esophageal spasm).   Independently Interpreted Test(s):   I have ordered and independently interpreted X-rays - see prior notes.  I have ordered and independently interpreted EKG Reading(s) - see prior notes  Clinical Tests:   Lab Tests: Reviewed and Ordered  The following lab test(s) were unremarkable: CBC, CMP, Troponin and Urinalysis       <> Summary of Lab: Pt CBC unremarkable.  No leukocytosis noted.  H&H stable at 14.3 and 41.8.  CMP unremarkable.  Troponin negative at 0.020.  BNP negative at 31.    Radiological Study: Ordered and Reviewed  Medical Tests: Ordered and Reviewed  ED Management:  I will get labs, imaging and reassess.  I discussed the care of this patient with my Supervising Physician.    Chest X-ray negative for any acute infectious process.  No cardiomegaly  noted.      Discussed case with cardiology.  Fellow at bedside to evaluate patient.  Cardiology states to admit the patient for observation.  Possible stress echo tomorrow.      Pt updated on plan of care.  All questions and concerns addressed.                        Clinical Impression:       ICD-10-CM ICD-9-CM   1. Congestive heart failure, unspecified HF chronicity, unspecified heart failure type I50.9 428.0   2. Chest pain R07.9 786.50         Disposition:   Disposition: Placed in Observation  Condition: Stable                        Alexandria Brown NP  03/15/19 [id number]

## 2019-03-15 NOTE — ED NOTES
Telemetry Verification   Patient placed on Telemetry Box  Verified on War Room monitor  Box [id number]   Monitor Tech Trinell   Rate 82   Rhythm SR

## 2019-03-15 NOTE — ASSESSMENT & PLAN NOTE
-placed in observation 3/15 due to chest pain  -in the ED, CBC and chemistry unremarkable, BNP WNL, no evidence of volume overload on physical exam, troponin negative, CXR without acute findings, and EKG with NSR, left axis deviation, and inferolateral infarct (no priors for comparison)  -chest pain free at current  -troponin trend 0.020 >> 0.050   -discussed with ED Cards fellow >> ACS protocol initiated  -continue ASA, statin, carvedilol, and prasugrel (loaded) with heparin gtt  -TTE pending  -continue tele monitoring  -EKG and SL NTG PRN chest pain  -cardiac diet in house  -Cardiac Rehab referral   -outpt follow up with Cardiology at [place]

## 2019-03-15 NOTE — SUBJECTIVE & OBJECTIVE
Past Medical History:   Diagnosis Date    CHF (congestive heart failure)     Coronary artery disease     GERD (gastroesophageal reflux disease)     Hypertension        Past Surgical History:   Procedure Laterality Date    HERNIA REPAIR         Review of patient's allergies indicates:  No Known Allergies    No current facility-administered medications on file prior to encounter.      Current Outpatient Medications on File Prior to Encounter   Medication Sig    aspirin 325 MG tablet Take 325 mg by mouth once daily.    atorvastatin (LIPITOR) 40 MG tablet Take 40 mg by mouth once daily.    bumetanide (BUMEX) 2 MG tablet Take 2 mg by mouth once daily.    carvedilol (COREG) 3.125 MG tablet Take 3.125 mg by mouth 2 (two) times daily with meals.    cetirizine (ZYRTEC) 10 MG tablet Take 10 mg by mouth once daily.    pantoprazole (PROTONIX) 20 MG tablet Take 20 mg by mouth once daily.    prasugrel (EFFIENT) 10 mg Tab Take 10 mg by mouth once daily.    sacubitril-valsartan (ENTRESTO) 49-51 mg per tablet Take 1 tablet by mouth 2 (two) times daily.     Family History     Problem Relation (Age of Onset)    Heart failure Father        Tobacco Use    Smoking status: Former Smoker     Types: Cigarettes     Last attempt to quit: 2018     Years since quittin.2    Smokeless tobacco: Current User     Types: Snuff   Substance and Sexual Activity    Alcohol use: Not on file     Comment: occasionally    Drug use: No    Sexual activity: Yes     Partners: Female     Review of Systems   Constitution: Negative for decreased appetite, weight gain and weight loss.   HENT: Negative.    Eyes: Negative.    Cardiovascular: Positive for chest pain. Negative for dyspnea on exertion, irregular heartbeat, orthopnea, paroxysmal nocturnal dyspnea and syncope.   Respiratory: Negative.  Negative for cough.    Gastrointestinal: Negative for bloating, melena, nausea and vomiting.   Genitourinary: Negative.    Neurological:  Negative.      Objective:     Vital Signs (Most Recent):  Temp: 98.1 °F (36.7 °C) (03/15/19 1123)  Pulse: 86 (03/15/19 1415)  Resp: 20 (03/15/19 1415)  BP: 107/68 (03/15/19 1415)  SpO2: 99 % (03/15/19 1415) Vital Signs (24h Range):  Temp:  [98.1 °F (36.7 °C)] 98.1 °F (36.7 °C)  Pulse:  [] 86  Resp:  [18-20] 20  SpO2:  [97 %-99 %] 99 %  BP: ()/(60-71) 107/68     Weight: 88.9 kg (196 lb)  Body mass index is 28.12 kg/m².    SpO2: 99 %         Intake/Output Summary (Last 24 hours) at 3/15/2019 1615  Last data filed at 3/15/2019 1432  Gross per 24 hour   Intake 500 ml   Output 1900 ml   Net -1400 ml       Lines/Drains/Airways     Peripheral Intravenous Line                 Peripheral IV - Single Lumen 03/15/19 1213 Left Forearm less than 1 day                Physical Exam   Constitutional: He appears well-developed and well-nourished. No distress.   HENT:   Head: Normocephalic.   Eyes: Pupils are equal, round, and reactive to light.   Neck: Normal range of motion. Neck supple. No JVD present.   Cardiovascular: Normal rate, regular rhythm, normal heart sounds and intact distal pulses. Exam reveals no gallop and no friction rub.   No murmur heard.  Pulmonary/Chest: Effort normal and breath sounds normal. He has no rales.   Abdominal: Soft. Bowel sounds are normal.   Musculoskeletal: Normal range of motion. He exhibits no edema.   Neurological: He is alert.   Skin: Skin is warm. He is not diaphoretic.

## 2019-03-15 NOTE — ED NOTES
MARIA E Brown made aware patient took 325mg this AM. She instructed do not administer and she will cancel it

## 2019-03-15 NOTE — SUBJECTIVE & OBJECTIVE
Past Medical History:   Diagnosis Date    CHF (congestive heart failure)     Coronary artery disease     GERD (gastroesophageal reflux disease)     Hypertension        Past Surgical History:   Procedure Laterality Date    HERNIA REPAIR         Review of patient's allergies indicates:  No Known Allergies    No current facility-administered medications on file prior to encounter.      Current Outpatient Medications on File Prior to Encounter   Medication Sig    aspirin 325 MG tablet Take 325 mg by mouth once daily.    atorvastatin (LIPITOR) 40 MG tablet Take 40 mg by mouth once daily.    bumetanide (BUMEX) 2 MG tablet Take 2 mg by mouth once daily.    carvedilol (COREG) 3.125 MG tablet Take 3.125 mg by mouth 2 (two) times daily with meals.    cetirizine (ZYRTEC) 10 MG tablet Take 10 mg by mouth once daily.    pantoprazole (PROTONIX) 20 MG tablet Take 20 mg by mouth once daily.    prasugrel (EFFIENT) 10 mg Tab Take 10 mg by mouth once daily.    sacubitril-valsartan (ENTRESTO) 49-51 mg per tablet Take 1 tablet by mouth 2 (two) times daily.     Family History     Problem Relation (Age of Onset)    Heart failure Father        Tobacco Use    Smoking status: Former Smoker     Types: Cigarettes     Last attempt to quit: 2018     Years since quittin.2    Smokeless tobacco: Current User     Types: Snuff   Substance and Sexual Activity    Alcohol use: Not on file     Comment: occasionally    Drug use: No    Sexual activity: Yes     Partners: Female     Review of Systems   Constitutional: Negative for activity change, appetite change, chills, diaphoresis, fatigue, fever and unexpected weight change.   HENT: Negative for congestion, sinus pressure, sinus pain, sneezing, sore throat, trouble swallowing and voice change.    Respiratory: Positive for shortness of breath (now resolved). Negative for cough, chest tightness, wheezing and stridor.    Cardiovascular: Positive for chest pain (now reoslved ).    Gastrointestinal: Negative for abdominal distention, abdominal pain, constipation, diarrhea, nausea and vomiting.   Genitourinary: Negative for decreased urine volume, difficulty urinating, hematuria and urgency.   Musculoskeletal: Negative for arthralgias, back pain, gait problem, joint swelling, myalgias and neck pain.   Skin: Negative for color change, pallor, rash and wound.   Neurological: Negative for dizziness, syncope, weakness and light-headedness.     Objective:     Vital Signs (Most Recent):  Temp: 99 °F (37.2 °C) (03/15/19 1615)  Pulse: 87 (03/15/19 1615)  Resp: 18 (03/15/19 1615)  BP: 103/61 (03/15/19 1615)  SpO2: 99 % (03/15/19 1415) Vital Signs (24h Range):  Temp:  [98.1 °F (36.7 °C)-99 °F (37.2 °C)] 99 °F (37.2 °C)  Pulse:  [] 87  Resp:  [18-20] 18  SpO2:  [97 %-99 %] 99 %  BP: ()/(60-71) 103/61     Weight: 88.9 kg (196 lb)  Body mass index is 28.12 kg/m².    Physical Exam   Constitutional: He is oriented to person, place, and time. He appears well-developed and well-nourished. No distress.   HENT:   Head: Normocephalic and atraumatic.   Mouth/Throat: Mucous membranes are normal. Normal dentition. No oropharyngeal exudate.   Eyes: Conjunctivae and lids are normal. Pupils are equal, round, and reactive to light. No scleral icterus.   Neck: Normal range of motion. Neck supple. No JVD present.   Cardiovascular: Normal rate, regular rhythm, normal heart sounds and intact distal pulses. Exam reveals no gallop and no friction rub.   No murmur heard.  Pulmonary/Chest: Effort normal and breath sounds normal. No respiratory distress. He has no wheezes. He exhibits no tenderness.   Abdominal: Soft. Bowel sounds are normal. He exhibits no distension. There is no tenderness.   Musculoskeletal: Normal range of motion. He exhibits no edema, tenderness or deformity.   Neurological: He is alert and oriented to person, place, and time. No cranial nerve deficit.   Skin: Skin is warm and dry. Capillary  refill takes less than 2 seconds. No rash noted. He is not diaphoretic. No erythema.   Psychiatric: He has a normal mood and affect. Judgment and thought content normal.   Nursing note and vitals reviewed.        CRANIAL NERVES     CN III, IV, VI   Pupils are equal, round, and reactive to light.    Significant Labs:   CBC:   Recent Labs   Lab 03/15/19  1224   WBC 7.59   HGB 14.3   HCT 41.8        CMP:   Recent Labs   Lab 03/15/19  1224      K 3.9      CO2 29   GLU 82   BUN 20   CREATININE 1.2   CALCIUM 9.6   PROT 7.2   ALBUMIN 4.1   BILITOT 0.6   ALKPHOS 82   AST 14   ALT 18   ANIONGAP 8   EGFRNONAA >60.0     Cardiac Markers:   Recent Labs   Lab 03/15/19  1224   BNP 31     Magnesium:   Recent Labs   Lab 03/15/19  1224   MG 2.2     Troponin:   Recent Labs   Lab 03/15/19  1224 03/15/19  1543   TROPONINI 0.020 0.050*     All pertinent labs within the past 24 hours have been reviewed.    Significant Imaging: I have reviewed all pertinent imaging results/findings within the past 24 hours.

## 2019-03-15 NOTE — ASSESSMENT & PLAN NOTE
-euvolemic on exam  -CXR and BNP unremarkable  -continue home bumex, carvedilol, and entresto  -continue tele monitoring  -cardiac diet   -strict I&Os and daily weights

## 2019-03-16 LAB
ANION GAP SERPL CALC-SCNC: 8 MMOL/L
APTT BLDCRRT: 33 SEC
APTT BLDCRRT: 38.2 SEC
APTT BLDCRRT: 38.4 SEC
APTT BLDCRRT: 42.6 SEC
BASOPHILS # BLD AUTO: 0.08 K/UL
BASOPHILS NFR BLD: 1.3 %
BUN SERPL-MCNC: 19 MG/DL
CALCIUM SERPL-MCNC: 9.5 MG/DL
CHLORIDE SERPL-SCNC: 104 MMOL/L
CO2 SERPL-SCNC: 26 MMOL/L
CREAT SERPL-MCNC: 0.9 MG/DL
DIFFERENTIAL METHOD: ABNORMAL
EOSINOPHIL # BLD AUTO: 0.5 K/UL
EOSINOPHIL NFR BLD: 8.5 %
ERYTHROCYTE [DISTWIDTH] IN BLOOD BY AUTOMATED COUNT: 12 %
EST. GFR  (AFRICAN AMERICAN): >60 ML/MIN/1.73 M^2
EST. GFR  (NON AFRICAN AMERICAN): >60 ML/MIN/1.73 M^2
GLUCOSE SERPL-MCNC: 94 MG/DL
HCT VFR BLD AUTO: 40.5 %
HGB BLD-MCNC: 13.9 G/DL
IMM GRANULOCYTES # BLD AUTO: 0.01 K/UL
IMM GRANULOCYTES NFR BLD AUTO: 0.2 %
LYMPHOCYTES # BLD AUTO: 1.9 K/UL
LYMPHOCYTES NFR BLD: 30.5 %
MAGNESIUM SERPL-MCNC: 2.3 MG/DL
MCH RBC QN AUTO: 30.2 PG
MCHC RBC AUTO-ENTMCNC: 34.3 G/DL
MCV RBC AUTO: 88 FL
MONOCYTES # BLD AUTO: 0.6 K/UL
MONOCYTES NFR BLD: 8.9 %
NEUTROPHILS # BLD AUTO: 3.2 K/UL
NEUTROPHILS NFR BLD: 50.6 %
NRBC BLD-RTO: 0 /100 WBC
PLATELET # BLD AUTO: 155 K/UL
PMV BLD AUTO: 10.3 FL
POTASSIUM SERPL-SCNC: 3.7 MMOL/L
RBC # BLD AUTO: 4.61 M/UL
SODIUM SERPL-SCNC: 138 MMOL/L
TROPONIN I SERPL DL<=0.01 NG/ML-MCNC: 0.02 NG/ML
TROPONIN I SERPL DL<=0.01 NG/ML-MCNC: 0.03 NG/ML
WBC # BLD AUTO: 6.27 K/UL

## 2019-03-16 PROCEDURE — 84484 ASSAY OF TROPONIN QUANT: CPT | Mod: 91

## 2019-03-16 PROCEDURE — 25000003 PHARM REV CODE 250: Performed by: NURSE PRACTITIONER

## 2019-03-16 PROCEDURE — 36415 COLL VENOUS BLD VENIPUNCTURE: CPT

## 2019-03-16 PROCEDURE — 80048 BASIC METABOLIC PNL TOTAL CA: CPT

## 2019-03-16 PROCEDURE — 99233 PR SUBSEQUENT HOSPITAL CARE,LEVL III: ICD-10-PCS | Mod: ,,, | Performed by: NURSE PRACTITIONER

## 2019-03-16 PROCEDURE — 85730 THROMBOPLASTIN TIME PARTIAL: CPT | Mod: 91

## 2019-03-16 PROCEDURE — 85730 THROMBOPLASTIN TIME PARTIAL: CPT

## 2019-03-16 PROCEDURE — 83735 ASSAY OF MAGNESIUM: CPT

## 2019-03-16 PROCEDURE — 99233 SBSQ HOSP IP/OBS HIGH 50: CPT | Mod: ,,, | Performed by: NURSE PRACTITIONER

## 2019-03-16 PROCEDURE — 11000001 HC ACUTE MED/SURG PRIVATE ROOM

## 2019-03-16 PROCEDURE — 85025 COMPLETE CBC W/AUTO DIFF WBC: CPT

## 2019-03-16 RX ORDER — POTASSIUM CHLORIDE 750 MG/1
30 CAPSULE, EXTENDED RELEASE ORAL ONCE
Status: COMPLETED | OUTPATIENT
Start: 2019-03-16 | End: 2019-03-16

## 2019-03-16 RX ADMIN — ASPIRIN 325 MG ORAL TABLET 325 MG: 325 PILL ORAL at 08:03

## 2019-03-16 RX ADMIN — HEPARIN SODIUM AND DEXTROSE 12 UNITS/KG/HR: 10000; 5 INJECTION INTRAVENOUS at 12:03

## 2019-03-16 RX ADMIN — POTASSIUM CHLORIDE 30 MEQ: 750 CAPSULE, EXTENDED RELEASE ORAL at 08:03

## 2019-03-16 RX ADMIN — PRASUGREL 10 MG: 10 TABLET, FILM COATED ORAL at 10:03

## 2019-03-16 RX ADMIN — CARVEDILOL 3.12 MG: 3.12 TABLET, FILM COATED ORAL at 08:03

## 2019-03-16 RX ADMIN — BUMETANIDE 2 MG: 1 TABLET ORAL at 08:03

## 2019-03-16 RX ADMIN — ATORVASTATIN CALCIUM 40 MG: 20 TABLET, FILM COATED ORAL at 08:03

## 2019-03-16 RX ADMIN — PANTOPRAZOLE SODIUM 20 MG: 20 TABLET, DELAYED RELEASE ORAL at 08:03

## 2019-03-16 RX ADMIN — CETIRIZINE HYDROCHLORIDE 10 MG: 10 TABLET, FILM COATED ORAL at 08:03

## 2019-03-16 RX ADMIN — CARVEDILOL 3.12 MG: 3.12 TABLET, FILM COATED ORAL at 05:03

## 2019-03-16 RX ADMIN — STANDARDIZED SENNA CONCENTRATE AND DOCUSATE SODIUM 1 TABLET: 8.6; 5 TABLET, FILM COATED ORAL at 08:03

## 2019-03-17 LAB
ANION GAP SERPL CALC-SCNC: 6 MMOL/L
APTT BLDCRRT: 50.4 SEC
APTT BLDCRRT: 55 SEC
APTT BLDCRRT: 55.6 SEC
BUN SERPL-MCNC: 22 MG/DL
CALCIUM SERPL-MCNC: 9.5 MG/DL
CHLORIDE SERPL-SCNC: 103 MMOL/L
CO2 SERPL-SCNC: 31 MMOL/L
CREAT SERPL-MCNC: 1 MG/DL
EST. GFR  (AFRICAN AMERICAN): >60 ML/MIN/1.73 M^2
EST. GFR  (NON AFRICAN AMERICAN): >60 ML/MIN/1.73 M^2
GLUCOSE SERPL-MCNC: 96 MG/DL
MAGNESIUM SERPL-MCNC: 2.2 MG/DL
POTASSIUM SERPL-SCNC: 3.8 MMOL/L
SODIUM SERPL-SCNC: 140 MMOL/L

## 2019-03-17 PROCEDURE — 80048 BASIC METABOLIC PNL TOTAL CA: CPT

## 2019-03-17 PROCEDURE — 11000001 HC ACUTE MED/SURG PRIVATE ROOM

## 2019-03-17 PROCEDURE — 85730 THROMBOPLASTIN TIME PARTIAL: CPT

## 2019-03-17 PROCEDURE — 83735 ASSAY OF MAGNESIUM: CPT

## 2019-03-17 PROCEDURE — 85730 THROMBOPLASTIN TIME PARTIAL: CPT | Mod: 91

## 2019-03-17 PROCEDURE — 63600175 PHARM REV CODE 636 W HCPCS: Performed by: NURSE PRACTITIONER

## 2019-03-17 PROCEDURE — 99233 SBSQ HOSP IP/OBS HIGH 50: CPT | Mod: ,,, | Performed by: NURSE PRACTITIONER

## 2019-03-17 PROCEDURE — 25000003 PHARM REV CODE 250: Performed by: NURSE PRACTITIONER

## 2019-03-17 PROCEDURE — 99233 PR SUBSEQUENT HOSPITAL CARE,LEVL III: ICD-10-PCS | Mod: ,,, | Performed by: NURSE PRACTITIONER

## 2019-03-17 RX ORDER — POTASSIUM CHLORIDE 750 MG/1
30 CAPSULE, EXTENDED RELEASE ORAL ONCE
Status: COMPLETED | OUTPATIENT
Start: 2019-03-17 | End: 2019-03-17

## 2019-03-17 RX ADMIN — SACUBITRIL AND VALSARTAN 1 TABLET: 49; 51 TABLET, FILM COATED ORAL at 08:03

## 2019-03-17 RX ADMIN — CARVEDILOL 3.12 MG: 3.12 TABLET, FILM COATED ORAL at 06:03

## 2019-03-17 RX ADMIN — PANTOPRAZOLE SODIUM 20 MG: 20 TABLET, DELAYED RELEASE ORAL at 08:03

## 2019-03-17 RX ADMIN — HEPARIN SODIUM AND DEXTROSE 12 UNITS/KG/HR: 10000; 5 INJECTION INTRAVENOUS at 07:03

## 2019-03-17 RX ADMIN — POTASSIUM CHLORIDE 30 MEQ: 750 CAPSULE, EXTENDED RELEASE ORAL at 08:03

## 2019-03-17 RX ADMIN — ATORVASTATIN CALCIUM 40 MG: 20 TABLET, FILM COATED ORAL at 08:03

## 2019-03-17 RX ADMIN — PRASUGREL 10 MG: 10 TABLET, FILM COATED ORAL at 08:03

## 2019-03-17 RX ADMIN — BUMETANIDE 2 MG: 1 TABLET ORAL at 08:03

## 2019-03-17 RX ADMIN — CARVEDILOL 3.12 MG: 3.12 TABLET, FILM COATED ORAL at 08:03

## 2019-03-17 RX ADMIN — ASPIRIN 325 MG ORAL TABLET 325 MG: 325 PILL ORAL at 08:03

## 2019-03-17 RX ADMIN — CETIRIZINE HYDROCHLORIDE 10 MG: 10 TABLET, FILM COATED ORAL at 08:03

## 2019-03-17 NOTE — SUBJECTIVE & OBJECTIVE
Interval History: Resting in bed, family at bedside, has no needs or complaints. Understands plan of care. He denies chest pain, palpitations, or shortness of breath. Denies any acute events or distress overnight.     Review of Systems   Constitutional: Negative for activity change, appetite change, chills, diaphoresis, fatigue, fever and unexpected weight change.   HENT: Negative for congestion, sinus pressure, sinus pain, sneezing, sore throat, trouble swallowing and voice change.    Respiratory: Negative for cough, chest tightness, shortness of breath, wheezing and stridor.    Cardiovascular: Negative for chest pain.   Gastrointestinal: Negative for abdominal distention, abdominal pain, constipation, diarrhea, nausea and vomiting.   Genitourinary: Negative for decreased urine volume, difficulty urinating, hematuria and urgency.   Musculoskeletal: Negative for arthralgias, back pain, gait problem, joint swelling, myalgias and neck pain.   Skin: Negative for color change, pallor, rash and wound.   Neurological: Negative for dizziness, syncope, weakness and light-headedness.     Objective:     Vital Signs (Most Recent):  Temp: 98.2 °F (36.8 °C) (03/17/19 0814)  Pulse: 74 (03/17/19 1223)  Resp: 17 (03/17/19 0814)  BP: 106/67 (03/17/19 0814)  SpO2: 98 % (03/17/19 0814) Vital Signs (24h Range):  Temp:  [98 °F (36.7 °C)-98.2 °F (36.8 °C)] 98.2 °F (36.8 °C)  Pulse:  [57-79] 74  Resp:  [17-18] 17  SpO2:  [97 %-100 %] 98 %  BP: ()/(53-67) 106/67     Weight: 86.6 kg (190 lb 14.7 oz)  Body mass index is 27.39 kg/m².  No intake or output data in the 24 hours ending 03/17/19 1431     Physical Exam   Constitutional: He is oriented to person, place, and time. He appears well-developed and well-nourished. No distress.   HENT:   Head: Normocephalic and atraumatic.   Mouth/Throat: Mucous membranes are normal. Normal dentition. No oropharyngeal exudate.   Eyes: Conjunctivae and lids are normal. Pupils are equal, round, and  reactive to light. No scleral icterus.   Neck: Normal range of motion. Neck supple. No JVD present.   Cardiovascular: Normal rate, regular rhythm, normal heart sounds and intact distal pulses. Exam reveals no gallop and no friction rub.   No murmur heard.  Pulmonary/Chest: Effort normal and breath sounds normal. No respiratory distress. He has no wheezes. He exhibits no tenderness.   Abdominal: Soft. Bowel sounds are normal. He exhibits no distension. There is no tenderness.   Musculoskeletal: Normal range of motion. He exhibits no edema, tenderness or deformity.   Neurological: He is alert and oriented to person, place, and time. No cranial nerve deficit.   Skin: Skin is warm and dry. Capillary refill takes less than 2 seconds. No rash noted. He is not diaphoretic. No erythema.   Psychiatric: He has a normal mood and affect. Judgment and thought content normal.   Nursing note and vitals reviewed.    Significant Labs:   BMP:   Recent Labs   Lab 03/17/19  0535   GLU 96      K 3.8      CO2 31*   BUN 22*   CREATININE 1.0   CALCIUM 9.5   MG 2.2     CBC:   Recent Labs   Lab 03/16/19  0524   WBC 6.27   HGB 13.9*   HCT 40.5        Magnesium:   Recent Labs   Lab 03/16/19  0523 03/17/19  0535   MG 2.3 2.2     Troponin:   Recent Labs   Lab 03/15/19  2154 03/16/19  0523 03/16/19  0935   TROPONINI 0.012 0.027* 0.023     All pertinent labs within the past 24 hours have been reviewed.    Significant Imaging: I have reviewed all pertinent imaging results/findings within the past 24 hours.

## 2019-03-17 NOTE — HOSPITAL COURSE
Mr. Stephens was admitted 3/15 due to chest pain and placed on ACS protocol per Cardiology recommendations. He remained chest pain free since admission. Troponin trended noting 0.020 >> 0.050 >> 0.012 >> 0.027 >> 0.023. He was continued on home ASA, prasugrel (re-loaded at admission), statin, carvedilol, entresto, and bumex with initiation of heparin gtt per ACS protocol. TTE with EF 45%, grade I DD, mild global hypokinetic motion, normal RV function, CVP 3, and PASAP 19. Interventional Cardiology consulted and underwent LHC 3/18 with noted ulcerated 90% narrowing in the proximal 1st diagonal; underwent placement of drug-eluting stents 2 point by 22 and 2.5 x 8 placed in that region overlapping 1 another with no residual stenosis; 40% of the 3rd marginal branch; and right coronary stent was widely patent. Remained stable post cath and was discharged home with spouse, discharged education provided, outpt follow up with [facility] per patient request.

## 2019-03-17 NOTE — ASSESSMENT & PLAN NOTE
-placed in observation 3/15 due to chest pain  -in the ED, CBC and chemistry unremarkable, BNP WNL, no evidence of volume overload on physical exam, troponin negative, CXR without acute findings, and EKG with NSR, left axis deviation, and inferolateral infarct (no priors for comparison)  -chest pain free at current  -troponin trend 0.020 >> 0.050 >> 0.012 >> 0.027 >> 0.023   -discussed with ED Cards fellow >> ACS protocol initiated  -continue home ASA, prasugrel (loaded), statin, carvedilol, entresto, bumex, and heparin gtt  -TTE with EF 45%, grade I DD, mild global hypokinetic motion, normal RV function, CVP 3, and PASAP 19 >> no priors for comparsion  -NPO at [facility] Sunday, plan for Interventional Cardiology consult on Monday AM  -obtain OSH records when able   -continue tele monitoring  -EKG and SL NTG PRN chest pain  -cardiac diet in house  -Cardiac Rehab referral   -outpt follow up with Cardiology at [place]

## 2019-03-17 NOTE — PROGRESS NOTES
"[facility] Hospital Medicine  Progress Note    Patient Name: Juan Stephens  MRN: [id number]  Patient Class: IP- Inpatient   Admission Date: 3/15/2019  Length of Stay: 1 days  Attending Physician: Get Arellano, *  Primary Care Provider: Sanjuanita Smith MD    [facility] Medicine Team: [facility] MED MARY JO Escamilla NP    Subjective:     Principal Problem:Chest pain    HPI:  Mr. Stephens is a 33 year old male with past medical history of CHF (on Bumex), CAD, GERD, hypertension and MI (12/30/18) who presented to the Emergency Department for chest pain, he reports pain started last night while driving and felt like a pressure in his chest; stating "like someone was sitting on me".  He denied radiation of the pain and reports it can be as bad as 10/10 but at time of presentation is 5/10. He notes that is anginal equivalent is crushing chest pain substantially more severe than what he reports today. He denies taking any medications for the pain. He denies any headache, shortness of breath, fever or chills.  Pt states he is compliant with all medications and took his home  mg PTA. He reports prior MI in December was treated with one "right sided stent" which was placed at [facility]. He is independent in ADLs, employed, and lives outside of [place]. He denies tobacco or illicit drug use, social ETOH. He has a strong family history of cardiac disease, father had severe CHF and required heart transplant. Mr. Stephens reports his mother began reaching out on his behalf to be see in [facility] HTS clinic yesterday, leaving a message with their staff, however has not heard back about the referral as of yet. All past medical, social, and family history reviewed.     In the ED, CBC and chemistry unremarkable, BNP WNL, no evidence of volume overload on physical exam, troponin negative, CXR without acute findings, and EKG with NSR, left axis deviation, and inferolateral infarct (no " priors for comparison). The patient was placed in observation to the Hospital Medicine Service for further evaluation and treatment.     Hospital Course:  Mr. Stephens was admitted 3/15 due to chest pain and placed on ACS protocol per Cardiology recommendations. He has remained chest pain free since admission. Troponin trend 0.020 >> 0.050 >> 0.012 >> 0.027 >> 0.023. Continue home ASA, prasugrel (loaded), statin, carvedilol, entresto, bumex, and heparin gtt. TTE with EF 45%, grade I DD, mild global hypokinetic motion, normal RV function, CVP 3, and PASAP 19. NPO at [facility] Sunday, plan for Interventional Cardiology consult on Monday AM.     Interval History: Resting in bed, family at bedside, has no needs or complaints. Understands plan of care. He denies chest pain, palpitations, or shortness of breath. Denies any acute events or distress overnight.     Review of Systems   Constitutional: Negative for activity change, appetite change, chills, diaphoresis, fatigue, fever and unexpected weight change.   HENT: Negative for congestion, sinus pressure, sinus pain, sneezing, sore throat, trouble swallowing and voice change.    Respiratory: Negative for cough, chest tightness, shortness of breath, wheezing and stridor.    Cardiovascular: Negative for chest pain.   Gastrointestinal: Negative for abdominal distention, abdominal pain, constipation, diarrhea, nausea and vomiting.   Genitourinary: Negative for decreased urine volume, difficulty urinating, hematuria and urgency.   Musculoskeletal: Negative for arthralgias, back pain, gait problem, joint swelling, myalgias and neck pain.   Skin: Negative for color change, pallor, rash and wound.   Neurological: Negative for dizziness, syncope, weakness and light-headedness.     Objective:     Vital Signs (Most Recent):  Temp: 98 °F (36.7 °C) (03/16/19 1931)  Pulse: 75 (03/16/19 1931)  Resp: 18 (03/16/19 1931)  BP: 105/67 (03/16/19 1931)  SpO2: 100 % (03/16/19 1931) Vital Signs (24h  Range):  Temp:  [97.8 °F (36.6 °C)-98 °F (36.7 °C)] 98 °F (36.7 °C)  Pulse:  [59-81] 75  Resp:  [17-18] 18  SpO2:  [97 %-100 %] 100 %  BP: ()/(49-67) 105/67     Weight: 84.4 kg (186 lb 1.1 oz)  Body mass index is 26.7 kg/m².    Intake/Output Summary (Last 24 hours) at 3/16/2019 2210  Last data filed at 3/16/2019 0700  Gross per 24 hour   Intake 191 ml   Output 400 ml   Net -209 ml      Physical Exam   Constitutional: He is oriented to person, place, and time. He appears well-developed and well-nourished. No distress.   HENT:   Head: Normocephalic and atraumatic.   Mouth/Throat: Mucous membranes are normal. Normal dentition. No oropharyngeal exudate.   Eyes: Conjunctivae and lids are normal. Pupils are equal, round, and reactive to light. No scleral icterus.   Neck: Normal range of motion. Neck supple. No JVD present.   Cardiovascular: Normal rate, regular rhythm, normal heart sounds and intact distal pulses. Exam reveals no gallop and no friction rub.   No murmur heard.  Pulmonary/Chest: Effort normal and breath sounds normal. No respiratory distress. He has no wheezes. He exhibits no tenderness.   Abdominal: Soft. Bowel sounds are normal. He exhibits no distension. There is no tenderness.   Musculoskeletal: Normal range of motion. He exhibits no edema, tenderness or deformity.   Neurological: He is alert and oriented to person, place, and time. No cranial nerve deficit.   Skin: Skin is warm and dry. Capillary refill takes less than 2 seconds. No rash noted. He is not diaphoretic. No erythema.   Psychiatric: He has a normal mood and affect. Judgment and thought content normal.   Nursing note and vitals reviewed.    Significant Labs:   BMP:   Recent Labs   Lab 03/16/19  0523   GLU 94      K 3.7      CO2 26   BUN 19   CREATININE 0.9   CALCIUM 9.5   MG 2.3     CBC:   Recent Labs   Lab 03/15/19  1224 03/16/19  0524   WBC 7.59 6.27   HGB 14.3 13.9*   HCT 41.8 40.5    155     Magnesium:   Recent  Labs   Lab 03/15/19  1224 03/16/19  0523   MG 2.2 2.3     Troponin:   Recent Labs   Lab 03/15/19  2154 03/16/19  0523 03/16/19  0935   TROPONINI 0.012 0.027* 0.023     All pertinent labs within the past 24 hours have been reviewed.    Significant Imaging: I have reviewed all pertinent imaging results/findings within the past 24 hours.    Assessment/Plan:      * Chest pain    -placed in observation 3/15 due to chest pain  -in the ED, CBC and chemistry unremarkable, BNP WNL, no evidence of volume overload on physical exam, troponin negative, CXR without acute findings, and EKG with NSR, left axis deviation, and inferolateral infarct (no priors for comparison)  -chest pain free at current  -troponin trend 0.020 >> 0.050 >> 0.012 >> 0.027 >> 0.023   -discussed with ED Cards fellow >> ACS protocol initiated  -continue home ASA, prasugrel (loaded), statin, carvedilol, entresto, bumex, and heparin gtt  -TTE with EF 45%, grade I DD, mild global hypokinetic motion, normal RV function, CVP 3, and PASAP 19 >> no priors for comparsion  -NPO at [facility] Sunday, plan for Interventional Cardiology consult on Monday AM  -obtain OSH records when able   -continue tele monitoring  -EKG and SL NTG PRN chest pain  -cardiac diet in house  -Cardiac Rehab referral   -outpt follow up with Cardiology at [facility]     Coronary artery disease involving native coronary artery of native heart without angina pectoris    -see above and HPI     Acute on chronic congestive heart failure    -euvolemic on exam  -CXR and BNP unremarkable  -continue home bumex, carvedilol, and entresto  -continue tele monitoring  -cardiac diet   -strict I&Os and daily weights     Essential hypertension    -BP controlled, continue medications as above  -monitor      GERD (gastroesophageal reflux disease)    -no acute issues, chronic  -continue home PPI       VTE Risk Mitigation (From admission, onward)        Ordered     heparin 25,000 units in dextrose 5% 250 mL (100 units/mL)  infusion LOW INTENSITY nomogram - [facility]  Continuous      03/15/19 1642     heparin 25,000 units in dextrose 5% (100 units/ml) IV bolus from bag - ADDITIONAL PRN BOLUS - 60 units/kg (max bolus 4000 units)  As needed (PRN)      03/15/19 1642     heparin 25,000 units in dextrose 5% (100 units/ml) IV bolus from bag - ADDITIONAL PRN BOLUS - 30 units/kg (max bolus 4000 units)  As needed (PRN)      03/15/19 1642     IP VTE HIGH RISK PATIENT  Once      03/15/19 1525     Place sequential compression device  Until discontinued      03/15/19 1525     Reason for No Pharmacological VTE Prophylaxis  Once      03/15/19 1525          Dolores Escamilla, DNP, AG-ACNP, BC  Department of Hospital Medicine  [facility]  Pager [phone]  [facility] [id number]

## 2019-03-17 NOTE — PROGRESS NOTES
"[facility] Hospital Medicine  Progress Note    Patient Name: Juan Stephens  MRN: [id number]  Patient Class: IP- Inpatient   Admission Date: 3/15/2019  Length of Stay: 2 days  Attending Physician: Get Arellano, *  Primary Care Provider: Sanjuanita Smith MD    [facility] Medicine Team: [facility] MED MARY JO Escamilla NP    Subjective:     Principal Problem:Chest pain    HPI:  Mr. Stephens is a 33 year old male with past medical history of CHF (on Bumex), CAD, GERD, hypertension and MI (12/30/18) who presented to the Emergency Department for chest pain, he reports pain started last night while driving and felt like a pressure in his chest; stating "like someone was sitting on me".  He denied radiation of the pain and reports it can be as bad as 10/10 but at time of presentation is 5/10. He notes that is anginal equivalent is crushing chest pain substantially more severe than what he reports today. He denies taking any medications for the pain. He denies any headache, shortness of breath, fever or chills.  Pt states he is compliant with all medications and took his home  mg PTA. He reports prior MI in December was treated with one "right sided stent" which was placed at [facility]. He is independent in ADLs, employed, and lives outside of [place]. He denies tobacco or illicit drug use, social ETOH. He has a strong family history of cardiac disease, father had severe CHF and required heart transplant. Mr. Stephens reports his mother began reaching out on his behalf to be see in [facility] clinic yesterday, leaving a message with their staff, however has not heard back about the referral as of yet. All past medical, social, and family history reviewed.     In the ED, CBC and chemistry unremarkable, BNP WNL, no evidence of volume overload on physical exam, troponin negative, CXR without acute findings, and EKG with NSR, left axis deviation, and inferolateral infarct (no " priors for comparison). The patient was placed in observation to the Hospital Medicine Service for further evaluation and treatment.     Hospital Course:  Mr. Stephens was admitted 3/15 due to chest pain and placed on ACS protocol per Cardiology recommendations. He has remained chest pain free since admission. Troponin trend 0.020 >> 0.050 >> 0.012 >> 0.027 >> 0.023. Continue home ASA, prasugrel (loaded), statin, carvedilol, entresto, bumex, and heparin gtt. TTE with EF 45%, grade I DD, mild global hypokinetic motion, normal RV function, CVP 3, and PASAP 19. NPO at [facility] Sunday, plan for Interventional Cardiology consult on Monday AM.     Interval History: Resting in bed, family at bedside, has no needs or complaints. Understands plan of care. He denies chest pain, palpitations, or shortness of breath. Denies any acute events or distress overnight.     Review of Systems   Constitutional: Negative for activity change, appetite change, chills, diaphoresis, fatigue, fever and unexpected weight change.   HENT: Negative for congestion, sinus pressure, sinus pain, sneezing, sore throat, trouble swallowing and voice change.    Respiratory: Negative for cough, chest tightness, shortness of breath, wheezing and stridor.    Cardiovascular: Negative for chest pain.   Gastrointestinal: Negative for abdominal distention, abdominal pain, constipation, diarrhea, nausea and vomiting.   Genitourinary: Negative for decreased urine volume, difficulty urinating, hematuria and urgency.   Musculoskeletal: Negative for arthralgias, back pain, gait problem, joint swelling, myalgias and neck pain.   Skin: Negative for color change, pallor, rash and wound.   Neurological: Negative for dizziness, syncope, weakness and light-headedness.     Objective:     Vital Signs (Most Recent):  Temp: 98.2 °F (36.8 °C) (03/17/19 0814)  Pulse: 74 (03/17/19 1223)  Resp: 17 (03/17/19 0814)  BP: 106/67 (03/17/19 0814)  SpO2: 98 % (03/17/19 0814) Vital Signs (24h  Range):  Temp:  [98 °F (36.7 °C)-98.2 °F (36.8 °C)] 98.2 °F (36.8 °C)  Pulse:  [57-79] 74  Resp:  [17-18] 17  SpO2:  [97 %-100 %] 98 %  BP: ()/(53-67) 106/67     Weight: 86.6 kg (190 lb 14.7 oz)  Body mass index is 27.39 kg/m².  No intake or output data in the 24 hours ending 03/17/19 1431     Physical Exam   Constitutional: He is oriented to person, place, and time. He appears well-developed and well-nourished. No distress.   HENT:   Head: Normocephalic and atraumatic.   Mouth/Throat: Mucous membranes are normal. Normal dentition. No oropharyngeal exudate.   Eyes: Conjunctivae and lids are normal. Pupils are equal, round, and reactive to light. No scleral icterus.   Neck: Normal range of motion. Neck supple. No JVD present.   Cardiovascular: Normal rate, regular rhythm, normal heart sounds and intact distal pulses. Exam reveals no gallop and no friction rub.   No murmur heard.  Pulmonary/Chest: Effort normal and breath sounds normal. No respiratory distress. He has no wheezes. He exhibits no tenderness.   Abdominal: Soft. Bowel sounds are normal. He exhibits no distension. There is no tenderness.   Musculoskeletal: Normal range of motion. He exhibits no edema, tenderness or deformity.   Neurological: He is alert and oriented to person, place, and time. No cranial nerve deficit.   Skin: Skin is warm and dry. Capillary refill takes less than 2 seconds. No rash noted. He is not diaphoretic. No erythema.   Psychiatric: He has a normal mood and affect. Judgment and thought content normal.   Nursing note and vitals reviewed.    Significant Labs:   BMP:   Recent Labs   Lab 03/17/19  0535   GLU 96      K 3.8      CO2 31*   BUN 22*   CREATININE 1.0   CALCIUM 9.5   MG 2.2     CBC:   Recent Labs   Lab 03/16/19  0524   WBC 6.27   HGB 13.9*   HCT 40.5        Magnesium:   Recent Labs   Lab 03/16/19  0523 03/17/19  0535   MG 2.3 2.2     Troponin:   Recent Labs   Lab 03/15/19  2154 03/16/19  0523  03/16/19  0935   TROPONINI 0.012 0.027* 0.023     All pertinent labs within the past 24 hours have been reviewed.    Significant Imaging: I have reviewed all pertinent imaging results/findings within the past 24 hours.    Assessment/Plan:      * Chest pain    -placed in observation 3/15 due to chest pain  -in the ED, CBC and chemistry unremarkable, BNP WNL, no evidence of volume overload on physical exam, troponin negative, CXR without acute findings, and EKG with NSR, left axis deviation, and inferolateral infarct (no priors for comparison)  -chest pain free at current  -troponin trend 0.020 >> 0.050 >> 0.012 >> 0.027 >> 0.023   -discussed with ED Cards fellow >> ACS protocol initiated  -continue home ASA, prasugrel (loaded), statin, carvedilol, entresto, bumex, and heparin gtt  -TTE with EF 45%, grade I DD, mild global hypokinetic motion, normal RV function, CVP 3, and PASAP 19 >> no priors for comparsion  -NPO at [facility] Sunday, plan for Interventional Cardiology consult on Monday AM  -obtain OSH records when able   -continue tele monitoring  -EKG and SL NTG PRN chest pain  -cardiac diet in house  -Cardiac Rehab referral   -outpt follow up with Cardiology at [facility]     Coronary artery disease involving native coronary artery of native heart without angina pectoris    -see above and HPI     Acute on chronic congestive heart failure    -euvolemic on exam  -CXR and BNP unremarkable  -continue home bumex, carvedilol, and entresto  -continue tele monitoring  -cardiac diet   -strict I&Os and daily weights     Essential hypertension    -BP controlled, continue medications as above  -monitor      GERD (gastroesophageal reflux disease)    -no acute issues, chronic  -continue home PPI       VTE Risk Mitigation (From admission, onward)        Ordered     heparin 25,000 units in dextrose 5% 250 mL (100 units/mL) infusion LOW INTENSITY nomogram - [facility]  Continuous      03/15/19 1642     heparin 25,000 units in dextrose 5% (100 units/ml)  IV bolus from bag - ADDITIONAL PRN BOLUS - 60 units/kg (max bolus 4000 units)  As needed (PRN)      03/15/19 1642     heparin 25,000 units in dextrose 5% (100 units/ml) IV bolus from bag - ADDITIONAL PRN BOLUS - 30 units/kg (max bolus 4000 units)  As needed (PRN)      03/15/19 1642     IP VTE HIGH RISK PATIENT  Once      03/15/19 1525     Place sequential compression device  Until discontinued      03/15/19 1525     Reason for No Pharmacological VTE Prophylaxis  Once      03/15/19 1525          Dolores Escamilla, DNP, AG-ACNP, BC  Department of Hospital Medicine  [facility]  Pager [phone]  [facility] [id number]

## 2019-03-17 NOTE — SUBJECTIVE & OBJECTIVE
Interval History: Resting in bed, family at bedside, has no needs or complaints. Understands plan of care. He denies chest pain, palpitations, or shortness of breath. Denies any acute events or distress overnight.     Review of Systems   Constitutional: Negative for activity change, appetite change, chills, diaphoresis, fatigue, fever and unexpected weight change.   HENT: Negative for congestion, sinus pressure, sinus pain, sneezing, sore throat, trouble swallowing and voice change.    Respiratory: Negative for cough, chest tightness, shortness of breath, wheezing and stridor.    Cardiovascular: Negative for chest pain.   Gastrointestinal: Negative for abdominal distention, abdominal pain, constipation, diarrhea, nausea and vomiting.   Genitourinary: Negative for decreased urine volume, difficulty urinating, hematuria and urgency.   Musculoskeletal: Negative for arthralgias, back pain, gait problem, joint swelling, myalgias and neck pain.   Skin: Negative for color change, pallor, rash and wound.   Neurological: Negative for dizziness, syncope, weakness and light-headedness.     Objective:     Vital Signs (Most Recent):  Temp: 98 °F (36.7 °C) (03/16/19 1931)  Pulse: 75 (03/16/19 1931)  Resp: 18 (03/16/19 1931)  BP: 105/67 (03/16/19 1931)  SpO2: 100 % (03/16/19 1931) Vital Signs (24h Range):  Temp:  [97.8 °F (36.6 °C)-98 °F (36.7 °C)] 98 °F (36.7 °C)  Pulse:  [59-81] 75  Resp:  [17-18] 18  SpO2:  [97 %-100 %] 100 %  BP: ()/(49-67) 105/67     Weight: 84.4 kg (186 lb 1.1 oz)  Body mass index is 26.7 kg/m².    Intake/Output Summary (Last 24 hours) at 3/16/2019 2210  Last data filed at 3/16/2019 0700  Gross per 24 hour   Intake 191 ml   Output 400 ml   Net -209 ml      Physical Exam   Constitutional: He is oriented to person, place, and time. He appears well-developed and well-nourished. No distress.   HENT:   Head: Normocephalic and atraumatic.   Mouth/Throat: Mucous membranes are normal. Normal dentition. No  oropharyngeal exudate.   Eyes: Conjunctivae and lids are normal. Pupils are equal, round, and reactive to light. No scleral icterus.   Neck: Normal range of motion. Neck supple. No JVD present.   Cardiovascular: Normal rate, regular rhythm, normal heart sounds and intact distal pulses. Exam reveals no gallop and no friction rub.   No murmur heard.  Pulmonary/Chest: Effort normal and breath sounds normal. No respiratory distress. He has no wheezes. He exhibits no tenderness.   Abdominal: Soft. Bowel sounds are normal. He exhibits no distension. There is no tenderness.   Musculoskeletal: Normal range of motion. He exhibits no edema, tenderness or deformity.   Neurological: He is alert and oriented to person, place, and time. No cranial nerve deficit.   Skin: Skin is warm and dry. Capillary refill takes less than 2 seconds. No rash noted. He is not diaphoretic. No erythema.   Psychiatric: He has a normal mood and affect. Judgment and thought content normal.   Nursing note and vitals reviewed.    Significant Labs:   BMP:   Recent Labs   Lab 03/16/19  0523   GLU 94      K 3.7      CO2 26   BUN 19   CREATININE 0.9   CALCIUM 9.5   MG 2.3     CBC:   Recent Labs   Lab 03/15/19  1224 03/16/19  0524   WBC 7.59 6.27   HGB 14.3 13.9*   HCT 41.8 40.5    155     Magnesium:   Recent Labs   Lab 03/15/19  1224 03/16/19  0523   MG 2.2 2.3     Troponin:   Recent Labs   Lab 03/15/19  2154 03/16/19  0523 03/16/19  0935   TROPONINI 0.012 0.027* 0.023     All pertinent labs within the past 24 hours have been reviewed.    Significant Imaging: I have reviewed all pertinent imaging results/findings within the past 24 hours.

## 2019-03-18 VITALS
WEIGHT: 190.56 LBS | BODY MASS INDEX: 27.28 KG/M2 | SYSTOLIC BLOOD PRESSURE: 102 MMHG | TEMPERATURE: 99 F | OXYGEN SATURATION: 96 % | DIASTOLIC BLOOD PRESSURE: 64 MMHG | RESPIRATION RATE: 18 BRPM | HEART RATE: 75 BPM | HEIGHT: 70 IN

## 2019-03-18 PROBLEM — R07.9 CHEST PAIN: Status: RESOLVED | Noted: 2019-03-15 | Resolved: 2019-03-18

## 2019-03-18 LAB
ABO + RH BLD: NORMAL
ANION GAP SERPL CALC-SCNC: 10 MMOL/L
APTT BLDCRRT: 51.5 SEC
BASOPHILS # BLD AUTO: 0.07 K/UL
BASOPHILS NFR BLD: 1.3 %
BLD GP AB SCN CELLS X3 SERPL QL: NORMAL
BUN SERPL-MCNC: 23 MG/DL
CALCIUM SERPL-MCNC: 10 MG/DL
CHLORIDE SERPL-SCNC: 103 MMOL/L
CO2 SERPL-SCNC: 26 MMOL/L
CREAT SERPL-MCNC: 1 MG/DL
DIFFERENTIAL METHOD: NORMAL
EOSINOPHIL # BLD AUTO: 0.4 K/UL
EOSINOPHIL NFR BLD: 6.9 %
ERYTHROCYTE [DISTWIDTH] IN BLOOD BY AUTOMATED COUNT: 11.8 %
EST. GFR  (AFRICAN AMERICAN): >60 ML/MIN/1.73 M^2
EST. GFR  (NON AFRICAN AMERICAN): >60 ML/MIN/1.73 M^2
GLUCOSE SERPL-MCNC: 94 MG/DL
HCT VFR BLD AUTO: 44.1 %
HGB BLD-MCNC: 15 G/DL
IMM GRANULOCYTES # BLD AUTO: 0.01 K/UL
IMM GRANULOCYTES NFR BLD AUTO: 0.2 %
LYMPHOCYTES # BLD AUTO: 2.4 K/UL
LYMPHOCYTES NFR BLD: 43.7 %
MAGNESIUM SERPL-MCNC: 2.1 MG/DL
MCH RBC QN AUTO: 30.2 PG
MCHC RBC AUTO-ENTMCNC: 34 G/DL
MCV RBC AUTO: 89 FL
MONOCYTES # BLD AUTO: 0.4 K/UL
MONOCYTES NFR BLD: 7.3 %
NEUTROPHILS # BLD AUTO: 2.2 K/UL
NEUTROPHILS NFR BLD: 40.6 %
NRBC BLD-RTO: 0 /100 WBC
PLATELET # BLD AUTO: 165 K/UL
PMV BLD AUTO: 10.4 FL
POTASSIUM SERPL-SCNC: 3.8 MMOL/L
RBC # BLD AUTO: 4.96 M/UL
SODIUM SERPL-SCNC: 139 MMOL/L
WBC # BLD AUTO: 5.47 K/UL

## 2019-03-18 PROCEDURE — 92928 PRQ TCAT PLMT NTRAC ST 1 LES: CPT | Mod: LD,,, | Performed by: INTERNAL MEDICINE

## 2019-03-18 PROCEDURE — 93458 PR CATH PLACE/CORON ANGIO, IMG SUPER/INTERP,W LEFT HEART VENTRICULOGRAPHY: ICD-10-PCS | Mod: 26,59,, | Performed by: INTERNAL MEDICINE

## 2019-03-18 PROCEDURE — C1725 CATH, TRANSLUMIN NON-LASER: HCPCS | Performed by: INTERNAL MEDICINE

## 2019-03-18 PROCEDURE — 25000003 PHARM REV CODE 250: Performed by: STUDENT IN AN ORGANIZED HEALTH CARE EDUCATION/TRAINING PROGRAM

## 2019-03-18 PROCEDURE — 20600001 HC STEP DOWN PRIVATE ROOM

## 2019-03-18 PROCEDURE — 25000003 PHARM REV CODE 250: Performed by: NURSE PRACTITIONER

## 2019-03-18 PROCEDURE — C1887 CATHETER, GUIDING: HCPCS | Performed by: INTERNAL MEDICINE

## 2019-03-18 PROCEDURE — 93005 ELECTROCARDIOGRAM TRACING: CPT

## 2019-03-18 PROCEDURE — C1894 INTRO/SHEATH, NON-LASER: HCPCS | Performed by: INTERNAL MEDICINE

## 2019-03-18 PROCEDURE — 85347 COAGULATION TIME ACTIVATED: CPT | Performed by: INTERNAL MEDICINE

## 2019-03-18 PROCEDURE — 92928 PR STENT: ICD-10-PCS | Mod: LD,,, | Performed by: INTERNAL MEDICINE

## 2019-03-18 PROCEDURE — 99152 MOD SED SAME PHYS/QHP 5/>YRS: CPT | Performed by: INTERNAL MEDICINE

## 2019-03-18 PROCEDURE — 93010 ELECTROCARDIOGRAM REPORT: CPT | Mod: 76,,, | Performed by: INTERNAL MEDICINE

## 2019-03-18 PROCEDURE — 93458 L HRT ARTERY/VENTRICLE ANGIO: CPT | Mod: 59 | Performed by: INTERNAL MEDICINE

## 2019-03-18 PROCEDURE — 93010 EKG 12-LEAD: ICD-10-PCS | Mod: ,,, | Performed by: INTERNAL MEDICINE

## 2019-03-18 PROCEDURE — A4216 STERILE WATER/SALINE, 10 ML: HCPCS | Performed by: NURSE PRACTITIONER

## 2019-03-18 PROCEDURE — 63600175 PHARM REV CODE 636 W HCPCS: Performed by: NURSE PRACTITIONER

## 2019-03-18 PROCEDURE — 36415 COLL VENOUS BLD VENIPUNCTURE: CPT

## 2019-03-18 PROCEDURE — 80048 BASIC METABOLIC PNL TOTAL CA: CPT

## 2019-03-18 PROCEDURE — 83735 ASSAY OF MAGNESIUM: CPT

## 2019-03-18 PROCEDURE — 99152 MOD SED SAME PHYS/QHP 5/>YRS: CPT | Mod: ,,, | Performed by: INTERNAL MEDICINE

## 2019-03-18 PROCEDURE — C9600 PERC DRUG-EL COR STENT SING: HCPCS | Mod: LD | Performed by: INTERNAL MEDICINE

## 2019-03-18 PROCEDURE — 25000003 PHARM REV CODE 250: Performed by: INTERNAL MEDICINE

## 2019-03-18 PROCEDURE — 85730 THROMBOPLASTIN TIME PARTIAL: CPT

## 2019-03-18 PROCEDURE — 86901 BLOOD TYPING SEROLOGIC RH(D): CPT

## 2019-03-18 PROCEDURE — C1874 STENT, COATED/COV W/DEL SYS: HCPCS | Performed by: INTERNAL MEDICINE

## 2019-03-18 PROCEDURE — C1769 GUIDE WIRE: HCPCS | Performed by: INTERNAL MEDICINE

## 2019-03-18 PROCEDURE — 99153 MOD SED SAME PHYS/QHP EA: CPT | Performed by: INTERNAL MEDICINE

## 2019-03-18 PROCEDURE — 99152 PR MOD CONSCIOUS SEDATION, SAME PHYS, 5+ YRS, FIRST 15 MIN: ICD-10-PCS | Mod: ,,, | Performed by: INTERNAL MEDICINE

## 2019-03-18 PROCEDURE — 27201423 OPTIME MED/SURG SUP & DEVICES STERILE SUPPLY: Performed by: INTERNAL MEDICINE

## 2019-03-18 PROCEDURE — 85025 COMPLETE CBC W/AUTO DIFF WBC: CPT

## 2019-03-18 PROCEDURE — 93458 L HRT ARTERY/VENTRICLE ANGIO: CPT | Mod: 26,59,, | Performed by: INTERNAL MEDICINE

## 2019-03-18 PROCEDURE — 63600175 PHARM REV CODE 636 W HCPCS: Performed by: INTERNAL MEDICINE

## 2019-03-18 DEVICE — STENT RONYX25008UX RESOLUTE ONYX 2.50X08
Type: IMPLANTABLE DEVICE | Site: CORONARY | Status: FUNCTIONAL
Brand: RESOLUTE ONYX™

## 2019-03-18 DEVICE — STENT RONYX25022UX RESOLUTE ONYX 2.50X22
Type: IMPLANTABLE DEVICE | Site: CORONARY | Status: FUNCTIONAL
Brand: RESOLUTE ONYX™

## 2019-03-18 RX ORDER — FENTANYL CITRATE 50 UG/ML
INJECTION, SOLUTION INTRAMUSCULAR; INTRAVENOUS
Status: DISCONTINUED | OUTPATIENT
Start: 2019-03-18 | End: 2019-03-18 | Stop reason: HOSPADM

## 2019-03-18 RX ORDER — VERAPAMIL HYDROCHLORIDE 2.5 MG/ML
INJECTION, SOLUTION INTRAVENOUS
Status: DISCONTINUED | OUTPATIENT
Start: 2019-03-18 | End: 2019-03-18 | Stop reason: HOSPADM

## 2019-03-18 RX ORDER — SODIUM CHLORIDE 0.9 % (FLUSH) 0.9 %
5 SYRINGE (ML) INJECTION
Status: DISCONTINUED | OUTPATIENT
Start: 2019-03-18 | End: 2019-03-18 | Stop reason: HOSPADM

## 2019-03-18 RX ORDER — DIPHENHYDRAMINE HYDROCHLORIDE 50 MG/ML
INJECTION INTRAMUSCULAR; INTRAVENOUS
Status: DISCONTINUED | OUTPATIENT
Start: 2019-03-18 | End: 2019-03-18 | Stop reason: HOSPADM

## 2019-03-18 RX ORDER — NITROGLYCERIN 0.4 MG/1
0.4 TABLET SUBLINGUAL EVERY 5 MIN PRN
Qty: 25 TABLET | Refills: 5 | Status: SHIPPED | OUTPATIENT
Start: 2019-03-18 | End: 2023-07-26

## 2019-03-18 RX ORDER — HEPARIN SODIUM 1000 [USP'U]/ML
INJECTION, SOLUTION INTRAVENOUS; SUBCUTANEOUS
Status: DISCONTINUED | OUTPATIENT
Start: 2019-03-18 | End: 2019-03-18 | Stop reason: HOSPADM

## 2019-03-18 RX ORDER — SODIUM CHLORIDE 9 MG/ML
INJECTION, SOLUTION INTRAVENOUS CONTINUOUS
Status: ACTIVE | OUTPATIENT
Start: 2019-03-18 | End: 2019-03-19

## 2019-03-18 RX ORDER — NITROGLYCERIN 5 MG/ML
INJECTION, SOLUTION INTRAVENOUS
Status: DISCONTINUED | OUTPATIENT
Start: 2019-03-18 | End: 2019-03-18 | Stop reason: HOSPADM

## 2019-03-18 RX ORDER — MIDAZOLAM HYDROCHLORIDE 1 MG/ML
INJECTION, SOLUTION INTRAMUSCULAR; INTRAVENOUS
Status: DISCONTINUED | OUTPATIENT
Start: 2019-03-18 | End: 2019-03-18 | Stop reason: HOSPADM

## 2019-03-18 RX ORDER — SODIUM CHLORIDE 9 MG/ML
INJECTION, SOLUTION INTRAVENOUS ONCE
Status: COMPLETED | OUTPATIENT
Start: 2019-03-18 | End: 2019-03-18

## 2019-03-18 RX ORDER — DIPHENHYDRAMINE HCL 50 MG
50 CAPSULE ORAL
Status: DISCONTINUED | OUTPATIENT
Start: 2019-03-18 | End: 2019-03-18 | Stop reason: HOSPADM

## 2019-03-18 RX ADMIN — CARVEDILOL 3.12 MG: 3.12 TABLET, FILM COATED ORAL at 09:03

## 2019-03-18 RX ADMIN — BUMETANIDE 2 MG: 1 TABLET ORAL at 09:03

## 2019-03-18 RX ADMIN — Medication 3 ML: at 09:03

## 2019-03-18 RX ADMIN — SODIUM CHLORIDE 1000 ML: 0.9 INJECTION, SOLUTION INTRAVENOUS at 03:03

## 2019-03-18 RX ADMIN — SODIUM CHLORIDE: 0.9 INJECTION, SOLUTION INTRAVENOUS at 08:03

## 2019-03-18 RX ADMIN — PANTOPRAZOLE SODIUM 20 MG: 20 TABLET, DELAYED RELEASE ORAL at 09:03

## 2019-03-18 RX ADMIN — PRASUGREL 10 MG: 10 TABLET, FILM COATED ORAL at 02:03

## 2019-03-18 RX ADMIN — ASPIRIN 325 MG ORAL TABLET 325 MG: 325 PILL ORAL at 09:03

## 2019-03-18 RX ADMIN — SACUBITRIL AND VALSARTAN 1 TABLET: 49; 51 TABLET, FILM COATED ORAL at 08:03

## 2019-03-18 RX ADMIN — DIPHENHYDRAMINE HYDROCHLORIDE 50 MG: 50 CAPSULE ORAL at 03:03

## 2019-03-18 RX ADMIN — HEPARIN SODIUM AND DEXTROSE 16 UNITS/KG/HR: 10000; 5 INJECTION INTRAVENOUS at 03:03

## 2019-03-18 RX ADMIN — Medication 3 ML: at 02:03

## 2019-03-18 RX ADMIN — CETIRIZINE HYDROCHLORIDE 10 MG: 10 TABLET, FILM COATED ORAL at 09:03

## 2019-03-18 NOTE — SUBJECTIVE & OBJECTIVE
Past Medical History:   Diagnosis Date    CHF (congestive heart failure)     Chronic diastolic heart failure     Coronary artery disease     GERD (gastroesophageal reflux disease)     Hypertension     NSTEMI (non-ST elevated myocardial infarction)        Past Surgical History:   Procedure Laterality Date    CORONARY ANGIOPLASTY WITH STENT PLACEMENT      HERNIA REPAIR      NM LEFT HEART CATH,PERCUTANEOUS  2018    Cardiac Cath, Left Heart       Review of patient's allergies indicates:  No Known Allergies    PTA Medications   Medication Sig    aspirin 325 MG tablet Take 325 mg by mouth once daily.    atorvastatin (LIPITOR) 40 MG tablet Take 40 mg by mouth once daily.    bumetanide (BUMEX) 2 MG tablet Take 2 mg by mouth once daily.    carvedilol (COREG) 3.125 MG tablet Take 3.125 mg by mouth 2 (two) times daily with meals.    cetirizine (ZYRTEC) 10 MG tablet Take 10 mg by mouth once daily.    pantoprazole (PROTONIX) 20 MG tablet Take 20 mg by mouth once daily.    prasugrel (EFFIENT) 10 mg Tab Take 10 mg by mouth once daily.    sacubitril-valsartan (ENTRESTO) 49-51 mg per tablet Take 1 tablet by mouth 2 (two) times daily.     Family History     Problem Relation (Age of Onset)    Heart failure Father        Tobacco Use    Smoking status: Former Smoker     Types: Cigarettes     Last attempt to quit: 2018     Years since quittin.2    Smokeless tobacco: Current User     Types: Snuff   Substance and Sexual Activity    Alcohol use: Not on file     Comment: occasionally    Drug use: No    Sexual activity: Yes     Partners: Female     Review of Systems   All other systems reviewed and are negative.    Objective:     Vital Signs (Most Recent):  Temp: 97.9 °F (36.6 °C) (19)  Pulse: 60 (19 0730)  Resp: 18 (19)  BP: (!) 102/56 (19 0730)  SpO2: 99 % (19) Vital Signs (24h Range):  Temp:  [97.8 °F (36.6 °C)-98.6 °F (37 °C)] 97.9 °F (36.6 °C)  Pulse:   [60-75] 60  Resp:  [16-18] 18  SpO2:  [96 %-99 %] 99 %  BP: ()/(50-68) 102/56     Weight: 86.4 kg (190 lb 9.4 oz)  Body mass index is 27.35 kg/m².    SpO2: 99 %  O2 Device (Oxygen Therapy): room air    No intake or output data in the 24 hours ending 03/18/19 1047    Lines/Drains/Airways     Peripheral Intravenous Line                 Peripheral IV - Single Lumen 03/15/19 1213 Left Forearm 2 days                Physical Exam   Constitutional: He is oriented to person, place, and time. No distress.   HENT:   Head: Atraumatic.   Mouth/Throat: No oropharyngeal exudate.   Eyes: EOM are normal. No scleral icterus.   Neck: Neck supple. No JVD present.   Cardiovascular: Normal rate, regular rhythm and normal heart sounds. Exam reveals no gallop and no friction rub.   No murmur heard.  Pulmonary/Chest: Effort normal and breath sounds normal. No respiratory distress. He has no wheezes. He has no rales. He exhibits no tenderness.   Abdominal: Soft. Bowel sounds are normal. He exhibits no distension. There is no tenderness.   Musculoskeletal: He exhibits no edema.   Neurological: He is alert and oriented to person, place, and time. No cranial nerve deficit.   Skin: Skin is warm and dry. No erythema.   Psychiatric: He has a normal mood and affect.       Significant Labs:   BMP:   Recent Labs   Lab 03/17/19  0535 03/18/19  0519   GLU 96 94    139   K 3.8 3.8    103   CO2 31* 26   BUN 22* 23*   CREATININE 1.0 1.0   CALCIUM 9.5 10.0   MG 2.2 2.1   , CMP   Recent Labs   Lab 03/17/19  0535 03/18/19  0519    139   K 3.8 3.8    103   CO2 31* 26   GLU 96 94   BUN 22* 23*   CREATININE 1.0 1.0   CALCIUM 9.5 10.0   ANIONGAP 6* 10   ESTGFRAFRICA >60.0 >60.0   EGFRNONAA >60.0 >60.0   , CBC   Recent Labs   Lab 03/18/19  0519   WBC 5.47   HGB 15.0   HCT 44.1      , INR No results for input(s): INR, PROTIME in the last 48 hours., Lipid Panel No results for input(s): CHOL, HDL, LDLCALC, TRIG, CHOLHDL in the  last 48 hours. and Troponin No results for input(s): TROPONINI in the last 48 hours.    Significant Imaging: Echocardiogram:   2D echo with color flow doppler: No results found for this or any previous visit. and Transthoracic echo (TTE) complete (Cupid Only):   Results for orders placed or performed during the hospital encounter of 03/15/19   Transthoracic echo (TTE) complete (Cupid Only)   Result Value Ref Range    BSA 2.04 m2    TDI SEPTAL 0.07     LV LATERAL E/E' RATIO 3.50     LV SEPTAL E/E' RATIO 7.00     LA WIDTH 3.63 cm    Right Atrial Pressure (from IVC) 3 mmHg    TDI LATERAL 0.14     LVIDD 5.11 3.5 - 6.0 cm    IVS 0.91 0.6 - 1.1 cm    PW 0.90 0.6 - 1.1 cm    LVIDS 4.11 (A) 2.1 - 4.0 cm    FS 20 28 - 44 %    LA volume 46.80 cm3    Sinus 3.05 cm    STJ 3.27 cm    Ascending aorta 3.14 cm    LV mass 165.29 g    LA size 3.79 cm    RVDD 2.52 cm    TAPSE 1.71 cm    RV S' 10.73 m/s    Left Ventricle Relative Wall Thickness 0.35 cm    AV mean gradient 3.45 mmHg    AV valve area 3.40 cm2    AV Velocity Ratio 0.72     AV index (prosthetic) 0.73     E/A ratio 0.78     Mean e' 0.11     E wave decelartion time 146.49 msec    Pulm vein S/D ratio 1.61     LVOT diameter 2.43 cm    LVOT area 4.64 cm2    LVOT peak epifanio [id number] m/s    LVOT peak VTI 14.32 cm    Ao peak epifanio 1.14 m/s    Ao VTI 19.51 cm    LVOT stroke volume 66.38 cm3    AV peak gradient 5.20 mmHg    TV rest pulmonary artery pressure 19 mmHg    E/E' ratio 4.67     MV Peak E Epifanio 0.49 m/s    TR Max Epifanio 1.97 m/s    MV Peak A Epifanio 0.63 m/s    PV Peak S Epifanio 0.45 m/s    PV Peak D Epifanio 0.28 m/s    LV Systolic Volume 74.67 mL    LV Systolic Volume Index 36.9 mL/m2    LV Diastolic Volume 124.11 mL    LV Diastolic Volume Index 61.32 mL/m2    LA Volume Index 23.1 mL/m2    LV Mass Index 81.7 g/m2    RA Major Axis 3.73 cm    Left Atrium Minor Axis 3.79 cm    Left Atrium Major Axis 4.24 cm    Triscuspid Valve Regurgitation Peak Gradient 15.52 mmHg    RA Width 3.25 cm     and EKG: NSR, inferiorlateral infarct

## 2019-03-18 NOTE — BRIEF OP NOTE
"    Post Cath Note  Referring Physician: Get Arellano, *  Procedure: Left heart cath (Left)  Indication: NSTEMI     Access: Right radial  Closure: Radial band  Findings: Patent RCA stent, 90% proximal D1 stenosis  Intervention: Successful PCI, overlapping SARAHI x 2 to proximal D1  LVEDP 8 mmHg    See full report for further details    Post Cath Exam:   /68 (BP Location: Right arm, Patient Position: Lying)   Pulse 70   Temp 98.5 °F (36.9 °C) (Oral)   Resp 18   Ht 5' 10" (1.778 m)   Wt 86.4 kg (190 lb 9.4 oz)   SpO2 100%   BMI 27.35 kg/m²   No unusual pain, hematoma, thrill or bruit at vascular access site.  Distal pulse present without signs of ischemia.    Recommendations:   - Routine post-cath care  - IVF at 250 cc/hr for 4 hrs  - Continue medical management: ASA indefinitely, Prasugrel for at least one year; continue coreg, entresto, high intensity statin  - Uptitrate coreg as tolerated by blood pressure  - Cardiac rehab referral  - Close follow up with outpatient cardiologist in 1-2 weeks    Signed:  Tommie Ron MD  Cardiology Fellow  Pager [phone]          "

## 2019-03-18 NOTE — HPI
Mr. Stephens is a 34 yo man with hx of CAD s/p MI 12/2018, ICM/CHF, HTN, GERD who presented yesterday with chest pain. He noted chest pressure/tightness while driving on Friday, reports as 5-7/10. Lasted 1-2 hrs, resolved at home while resting. Did not exert himself during pain, has not had any pain since prior MI. Similar to prior MI but not as severe. Improved with . At time of prior MI, he underwent LHC/PCI/SARAHI x1 to subtotal distal RCA; mild diffuse proximal LAD disease, 75% diffuse proximal Lcx stenosis. Complicated by cardiogenic shock, requiring IABP. Per patient, also underwent emergent cardioversion during case as well. Reports IABP in place for 1 day. Subsuquently did well after diuresis, no further hospitalizations or issues with fluid overload.     He has a family hx of CAD- father with severe CHF s/p OHTx. Nonsmoker, denies illicits. On ASA, prasugrel, atorvastatin 40, carvedilol 3.125, entresto 49-51, bumex 2 daily at home.     Tn mildly elevated to 0.050. EKG NSR with Q waves inferior c/w prior inferior infarct. He was continued on his home DAPT (re-loaded), statin, coreg, entresto, and bumex. Started on a heparin gtt. TTE with EF 45%, mild global hypokinesis.

## 2019-03-18 NOTE — CONSULTS
[facility]  Interventional Cardiology  Consult Note    Patient Name: Juan Stephens  MRN: [id number]  Admission Date: 3/15/2019  Hospital Length of Stay: 3 days  Code Status: Full Code   Attending Provider: Get Arellano, *  Consulting Provider: Tommie Ron MD  Primary Care Physician: Sanjuanita Smith MD  Principal Problem:Chest pain    Patient information was obtained from patient and past medical records.     Inpatient consult to Interventional Cardiology  Consult performed by: Tommie Ron MD  Consult ordered by: Dolores Escamilla NP        Subjective:     Chief Complaint:  Chest pain    HPI:  Mr. Stephens is a 32 yo man with hx of CAD s/p MI 12/2018, ICM/CHF, HTN, GERD who presented yesterday with chest pain. He noted chest pressure/tightness while driving on Friday, reports as 5-7/10. Lasted 1-2 hrs, resolved at home while resting. Did not exert himself during pain, has not had any pain since prior MI. Similar to prior MI but not as severe. Improved with . At time of prior MI, he underwent LHC/PCI/SARAHI x1 to subtotal distal RCA; mild diffuse proximal LAD disease, 75% diffuse proximal Lcx stenosis. Complicated by cardiogenic shock, requiring IABP. Per patient, also underwent emergent cardioversion during case as well. Reports IABP in place for 1 day. Subsuquently did well after diuresis, no further hospitalizations or issues with fluid overload.     He has a family hx of CAD- father with severe CHF s/p OHTx. Nonsmoker, denies illicits. On ASA, prasugrel, atorvastatin 40, carvedilol 3.125, entresto 49-51, bumex 2 daily at home.     Tn mildly elevated to 0.050. EKG NSR with Q waves inferior c/w prior inferior infarct. He was continued on his home DAPT (re-loaded), statin, coreg, entresto, and bumex. Started on a heparin gtt. TTE with EF 45%, mild global hypokinesis.      Past Medical History:   Diagnosis Date    CHF (congestive heart failure)     Chronic diastolic heart  failure     Coronary artery disease     GERD (gastroesophageal reflux disease)     Hypertension     NSTEMI (non-ST elevated myocardial infarction)        Past Surgical History:   Procedure Laterality Date    CORONARY ANGIOPLASTY WITH STENT PLACEMENT      HERNIA REPAIR      PA LEFT HEART CATH,PERCUTANEOUS  2018    Cardiac Cath, Left Heart       Review of patient's allergies indicates:  No Known Allergies    PTA Medications   Medication Sig    aspirin 325 MG tablet Take 325 mg by mouth once daily.    atorvastatin (LIPITOR) 40 MG tablet Take 40 mg by mouth once daily.    bumetanide (BUMEX) 2 MG tablet Take 2 mg by mouth once daily.    carvedilol (COREG) 3.125 MG tablet Take 3.125 mg by mouth 2 (two) times daily with meals.    cetirizine (ZYRTEC) 10 MG tablet Take 10 mg by mouth once daily.    pantoprazole (PROTONIX) 20 MG tablet Take 20 mg by mouth once daily.    prasugrel (EFFIENT) 10 mg Tab Take 10 mg by mouth once daily.    sacubitril-valsartan (ENTRESTO) 49-51 mg per tablet Take 1 tablet by mouth 2 (two) times daily.     Family History     Problem Relation (Age of Onset)    Heart failure Father        Tobacco Use    Smoking status: Former Smoker     Types: Cigarettes     Last attempt to quit: 2018     Years since quittin.2    Smokeless tobacco: Current User     Types: Snuff   Substance and Sexual Activity    Alcohol use: Not on file     Comment: occasionally    Drug use: No    Sexual activity: Yes     Partners: Female     Review of Systems   All other systems reviewed and are negative.    Objective:     Vital Signs (Most Recent):  Temp: 97.9 °F (36.6 °C) (19 0730)  Pulse: 60 (19 0730)  Resp: 18 (19 0730)  BP: (!) 102/56 (19 0730)  SpO2: 99 % (19 0730) Vital Signs (24h Range):  Temp:  [97.8 °F (36.6 °C)-98.6 °F (37 °C)] 97.9 °F (36.6 °C)  Pulse:  [60-75] 60  Resp:  [16-18] 18  SpO2:  [96 %-99 %] 99 %  BP: ()/(50-68) 102/56     Weight: 86.4 kg  (190 lb 9.4 oz)  Body mass index is 27.35 kg/m².    SpO2: 99 %  O2 Device (Oxygen Therapy): room air    No intake or output data in the 24 hours ending 03/18/19 1047    Lines/Drains/Airways     Peripheral Intravenous Line                 Peripheral IV - Single Lumen 03/15/19 1213 Left Forearm 2 days                Physical Exam   Constitutional: He is oriented to person, place, and time. No distress.   HENT:   Head: Atraumatic.   Mouth/Throat: No oropharyngeal exudate.   Eyes: EOM are normal. No scleral icterus.   Neck: Neck supple. No JVD present.   Cardiovascular: Normal rate, regular rhythm and normal heart sounds. Exam reveals no gallop and no friction rub.   No murmur heard.  Pulmonary/Chest: Effort normal and breath sounds normal. No respiratory distress. He has no wheezes. He has no rales. He exhibits no tenderness.   Abdominal: Soft. Bowel sounds are normal. He exhibits no distension. There is no tenderness.   Musculoskeletal: He exhibits no edema.   Neurological: He is alert and oriented to person, place, and time. No cranial nerve deficit.   Skin: Skin is warm and dry. No erythema.   Psychiatric: He has a normal mood and affect.       Significant Labs:   BMP:   Recent Labs   Lab 03/17/19  0535 03/18/19  0519   GLU 96 94    139   K 3.8 3.8    103   CO2 31* 26   BUN 22* 23*   CREATININE 1.0 1.0   CALCIUM 9.5 10.0   MG 2.2 2.1   , CMP   Recent Labs   Lab 03/17/19  0535 03/18/19 0519    139   K 3.8 3.8    103   CO2 31* 26   GLU 96 94   BUN 22* 23*   CREATININE 1.0 1.0   CALCIUM 9.5 10.0   ANIONGAP 6* 10   ESTGFRAFRICA >60.0 >60.0   EGFRNONAA >60.0 >60.0   , CBC   Recent Labs   Lab 03/18/19 0519   WBC 5.47   HGB 15.0   HCT 44.1      , INR No results for input(s): INR, PROTIME in the last 48 hours., Lipid Panel No results for input(s): CHOL, HDL, LDLCALC, TRIG, CHOLHDL in the last 48 hours. and Troponin No results for input(s): TROPONINI in the last 48 hours.    Significant  Imaging: Echocardiogram:   2D echo with color flow doppler: No results found for this or any previous visit. and Transthoracic echo (TTE) complete (Cupid Only):   Results for orders placed or performed during the hospital encounter of 03/15/19   Transthoracic echo (TTE) complete (Cupid Only)   Result Value Ref Range    BSA 2.04 m2    TDI SEPTAL 0.07     LV LATERAL E/E' RATIO 3.50     LV SEPTAL E/E' RATIO 7.00     LA WIDTH 3.63 cm    Right Atrial Pressure (from IVC) 3 mmHg    TDI LATERAL 0.14     LVIDD 5.11 3.5 - 6.0 cm    IVS 0.91 0.6 - 1.1 cm    PW 0.90 0.6 - 1.1 cm    LVIDS 4.11 (A) 2.1 - 4.0 cm    FS 20 28 - 44 %    LA volume 46.80 cm3    Sinus 3.05 cm    STJ 3.27 cm    Ascending aorta 3.14 cm    LV mass 165.29 g    LA size 3.79 cm    RVDD 2.52 cm    TAPSE 1.71 cm    RV S' 10.73 m/s    Left Ventricle Relative Wall Thickness 0.35 cm    AV mean gradient 3.45 mmHg    AV valve area 3.40 cm2    AV Velocity Ratio 0.72     AV index (prosthetic) 0.73     E/A ratio 0.78     Mean e' 0.11     E wave decelartion time 146.49 msec    Pulm vein S/D ratio 1.61     LVOT diameter 2.43 cm    LVOT area 4.64 cm2    LVOT peak epifanio [id number] m/s    LVOT peak VTI 14.32 cm    Ao peak epifanio 1.14 m/s    Ao VTI 19.51 cm    LVOT stroke volume 66.38 cm3    AV peak gradient 5.20 mmHg    TV rest pulmonary artery pressure 19 mmHg    E/E' ratio 4.67     MV Peak E Epifanio 0.49 m/s    TR Max Epifanio 1.97 m/s    MV Peak A Epifanio 0.63 m/s    PV Peak S Epifanio 0.45 m/s    PV Peak D Epifanio 0.28 m/s    LV Systolic Volume 74.67 mL    LV Systolic Volume Index 36.9 mL/m2    LV Diastolic Volume 124.11 mL    LV Diastolic Volume Index 61.32 mL/m2    LA Volume Index 23.1 mL/m2    LV Mass Index 81.7 g/m2    RA Major Axis 3.73 cm    Left Atrium Minor Axis 3.79 cm    Left Atrium Major Axis 4.24 cm    Triscuspid Valve Regurgitation Peak Gradient 15.52 mmHg    RA Width 3.25 cm    and EKG: NSR, inferiorlateral infarct    Assessment and Plan:     NSTEMI (non-ST elevated  myocardial infarction)    33M with hx of HTN, CAD s/p NSTEMI s/p PCI/SARAHI x1 12/2018, HTN, family hx of ICM/heart failure here with chest discomfort similar but less severe to prior ischemic pain, minimal troponin elevation 0.05. EKG with inferior Q waves c/w prior inferior infarct. TTE EF 45%, global mild hypokinesis. On medical management with home DAPT/statin/BB/ARB + heparin gtt here. CP free. ULICES 4. Cr 1.0    -[facility] +/- PCI  -R radial access  -Antiplt: ASA, prasugrel  -Allergies: none  -The risks, benefits & alternatives of the procedure were explained to the patient.    -The risks of coronary angiography include but are not limited to:  Bleeding, infection, heart rhythm abnormalities, allergic reactions, kidney injury, stroke and death.    -Should stenting be indicated, the patient has agreed to dual anti-platelet therapy for 1-consecutive year with a drug-eluting stent and a minimum of 1-month with the use of a bare metal stent.    -The risks of moderate sedation include hypotension, respiratory depression, arrhythmias, bronchospasm, & death.    -Informed consent was obtained & the patient is agreeable to proceed with the procedure.  -This patient was discussed with the attending interventional cardiologist who agrees with the above assessment & plan.             Thank you for your consult. I will follow-up with patient. Please contact us if you have any additional questions.    Tommie Ron MD  Interventional Cardiology   [facility]

## 2019-03-18 NOTE — CARE UPDATE
Spouse provided medical records from [place] for JERRY review.    Noted cardiac cath with PCI and intra-op cardiogenic shock requiring IABP ~ 24 hours. Troponin peak at that time appeared ~12.20.      Dolores Escamilla DNP, AG-ACNP, BC  Department of Hospital Medicine  [facility]

## 2019-03-18 NOTE — ASSESSMENT & PLAN NOTE
33M with hx of HTN, CAD s/p NSTEMI s/p PCI/SARAHI x1 12/2018, HTN, family hx of ICM/heart failure here with chest discomfort similar but less severe to prior ischemic pain, minimal troponin elevation 0.05. EKG with inferior Q waves c/w prior inferior infarct. TTE EF 45%, global mild hypokinesis. On medical management with home DAPT/statin/BB/ARB + heparin gtt here. CP free. ULICES 4. Cr 1.0    -C +/- PCI  -R radial access  -Antiplt: ASA, prasugrel  -Allergies: none  -The risks, benefits & alternatives of the procedure were explained to the patient.    -The risks of coronary angiography include but are not limited to:  Bleeding, infection, heart rhythm abnormalities, allergic reactions, kidney injury, stroke and death.    -Should stenting be indicated, the patient has agreed to dual anti-platelet therapy for 1-consecutive year with a drug-eluting stent and a minimum of 1-month with the use of a bare metal stent.    -The risks of moderate sedation include hypotension, respiratory depression, arrhythmias, bronchospasm, & death.    -Informed consent was obtained & the patient is agreeable to proceed with the procedure.  -This patient was discussed with the attending interventional cardiologist who agrees with the above assessment & plan.

## 2019-03-19 DIAGNOSIS — I50.9 ACUTE HEART FAILURE, UNSPECIFIED HEART FAILURE TYPE: Primary | ICD-10-CM

## 2019-03-19 PROCEDURE — 99239 PR HOSPITAL DISCHARGE DAY,>30 MIN: ICD-10-PCS | Mod: ,,, | Performed by: NURSE PRACTITIONER

## 2019-03-19 PROCEDURE — 99239 HOSP IP/OBS DSCHRG MGMT >30: CPT | Mod: ,,, | Performed by: NURSE PRACTITIONER

## 2019-03-19 NOTE — PLAN OF CARE
03/19/19 0705   Final Note   Assessment Type Final Discharge Note   Anticipated Discharge Disposition Home   Hospital Follow Up  Appt(s) scheduled? No

## 2019-03-19 NOTE — PLAN OF CARE
Problem: Adult Inpatient Plan of Care  Goal: Plan of Care Review  Outcome: Ongoing (interventions implemented as appropriate)  POC reviewed with Pt. Safety precautions maintained. Bed in low position wheels locked.side rails up x 2. Call light within reach.pt free of falls.

## 2019-03-20 ENCOUNTER — INITIAL CONSULT (OUTPATIENT)
Dept: TRANSPLANT | Facility: CLINIC | Age: 34
DRG: 246 | End: 2019-03-20
Attending: INTERNAL MEDICINE
Payer: MEDICAID

## 2019-03-20 VITALS
DIASTOLIC BLOOD PRESSURE: 59 MMHG | BODY MASS INDEX: 28.37 KG/M2 | SYSTOLIC BLOOD PRESSURE: 93 MMHG | WEIGHT: 198.19 LBS | HEIGHT: 70 IN | HEART RATE: 96 BPM

## 2019-03-20 DIAGNOSIS — I50.42 CHRONIC COMBINED SYSTOLIC AND DIASTOLIC CONGESTIVE HEART FAILURE: ICD-10-CM

## 2019-03-20 DIAGNOSIS — I10 ESSENTIAL HYPERTENSION: ICD-10-CM

## 2019-03-20 DIAGNOSIS — I25.10 CORONARY ARTERY DISEASE INVOLVING NATIVE CORONARY ARTERY OF NATIVE HEART WITHOUT ANGINA PECTORIS: Primary | ICD-10-CM

## 2019-03-20 DIAGNOSIS — I21.19 INFERIOR MI: ICD-10-CM

## 2019-03-20 DIAGNOSIS — R74.8 LOW SERUM HDL: ICD-10-CM

## 2019-03-20 PROBLEM — I50.9 ACUTE ON CHRONIC CONGESTIVE HEART FAILURE: Status: RESOLVED | Noted: 2019-03-15 | Resolved: 2019-03-20

## 2019-03-20 LAB
POC ACTIVATED CLOTTING TIME K: 290 SEC (ref 74–137)
POC ACTIVATED CLOTTING TIME K: 400 SEC (ref 74–137)
SAMPLE: ABNORMAL
SAMPLE: ABNORMAL

## 2019-03-20 PROCEDURE — 99999 PR PBB SHADOW E&M-EST. PATIENT-LVL III: ICD-10-PCS | Mod: PBBFAC,,, | Performed by: INTERNAL MEDICINE

## 2019-03-20 PROCEDURE — 99999 PR PBB SHADOW E&M-EST. PATIENT-LVL III: CPT | Mod: PBBFAC,,, | Performed by: INTERNAL MEDICINE

## 2019-03-20 PROCEDURE — 99213 OFFICE O/P EST LOW 20 MIN: CPT | Mod: PBBFAC | Performed by: INTERNAL MEDICINE

## 2019-03-20 PROCEDURE — 99204 OFFICE O/P NEW MOD 45 MIN: CPT | Mod: S$PBB,,, | Performed by: INTERNAL MEDICINE

## 2019-03-20 PROCEDURE — 99204 PR OFFICE/OUTPT VISIT, NEW, LEVL IV, 45-59 MIN: ICD-10-PCS | Mod: S$PBB,,, | Performed by: INTERNAL MEDICINE

## 2019-03-20 RX ORDER — POTASSIUM CHLORIDE 20 MEQ/1
20 TABLET, EXTENDED RELEASE ORAL DAILY
Refills: 6 | COMMUNITY
Start: 2019-02-27 | End: 2020-12-15

## 2019-03-20 RX ORDER — METOPROLOL SUCCINATE 50 MG/1
50 TABLET, EXTENDED RELEASE ORAL DAILY
Qty: 30 TABLET | Refills: 5 | Status: SHIPPED | OUTPATIENT
Start: 2019-03-20 | End: 2019-09-23 | Stop reason: SDUPTHER

## 2019-03-20 RX ORDER — ASPIRIN 81 MG/1
81 TABLET ORAL DAILY
Qty: 30 TABLET | Refills: 11
Start: 2019-03-20 | End: 2022-10-05

## 2019-03-20 RX ORDER — ATORVASTATIN CALCIUM 40 MG/1
40 TABLET, FILM COATED ORAL NIGHTLY
Qty: 30 TABLET | Refills: 5 | Status: SHIPPED | OUTPATIENT
Start: 2019-03-20 | End: 2019-12-31

## 2019-03-20 NOTE — PHYSICIAN QUERY
"PT Name: Juan Stephens  MR #: [id number]    Physician Query Form - Heart  Condition Clarification     CDS/: Omaira Olivares RN, CCDS             Contact information:[email or web address]  This form is a permanent document in the medical record.     Query Date: March 20, 2019    By submitting this query, we are merely seeking further clarification of documentation. Please utilize your independent clinical judgment when addressing the question(s) below.    The medical record contains the following   Indicators     Supporting Clinical Findings Location in Medical Record   X BNP 31 3/15  lab   X EF 45% TTE 3/15   X Radiology findings No acute finding. 3/15 cxr   X Echo Results · Mildly decreased left ventricular systolic function. The estimated ejection fraction is 45%  · Grade I (mild) left ventricular diastolic dysfunction consistent with impaired relaxation.  · Mild global hypokinetic wall motion.  · Normal right ventricular systolic function.  · The estimated PA systolic pressure is 19 mm Hg  · Normal central venous pressure (3 mm Hg). TTE 3/15    "Ascites" documented      "SOB" or "ORTIZ" documented      "Hypoxia" documented     X Heart Failure documented Acute on chronic CHF      Chronic diastolic heart failure 3/15 h/p, 3/16, 3/17 prog notes    3/18 consult    "Edema" documented     X Diuretics/Meds Bumetanide 2 mg po daily  Carvedilol 3.125 mg po 2 x daily 3/16- 3/19 mar  3/15- 3/19 mar    Treatment:     X Other:  , BNP WNL, no evidence of volume overload on physical exam, CXR without acute findings         3/15 h/p, 3/16, 3/17 prog notes   Heart failure (HF) can be acute, chronic or both. It is generally further specificed as systolic, diastolic, or combined. Lastly, it is important to identify an underlying etiology if known or suspected.     Common clues to acute exacerbation:  Rapidly progressive symptoms (w/in 2 weeks of presentation), using IV diuretics to treat, using supplemental O2, pulmonary edema " on Xray, MI w/in 4 weeks, and/or BNP >500    Systolic Heart Failure: is defined as chart documentation of a left ventricular ejection fraction (LVEF) less than 40%     Diastolic Heart Failure: is defined as a left ventricular ejection fraction (LVEF) greater than 40%   +      Evidence of diastolic dysfunction on echocardiography OR    Right heart catheterization wedge pressure above 12 mm Hg OR    Left heart catheterization left ventricular end diastolic pressure 18 mm Hg or above.    References: *American Heart Association    The clinical guidelines noted below are only system guidelines, and do not replace the providers clinical judgment.     Provider, please specify the diagnosis associated with above clinical findings    [   ] Acute on Chronic Diastolic Heart Failure -    Pre-existing diastoic HF diagnosis.  EF > 40%  and acute HF symptoms documented                                   [   ] Chronic Diastolic Heart Failure - Pre-existing diastolic HF diagnosis.  EF > 40%  without  acute HF symptoms documented    [ x  ] Other Type of Heart Failure (please specify type): _acute on chronic combined systolic and diastolic heart failure________________________    [   ] Other (please specify): ___________________________________  [   ] Clinically Undetermined                          Please document in your progress notes daily for the duration of treatment until resolved and include in your discharge summary.

## 2019-03-20 NOTE — PATIENT INSTRUCTIONS
Change to aspirin 81 mg qd--bolivar not ecotrin    Change carvedilol to metoprolol succinate 50 mg once daily  They will gradually go up at home to 200 mg a day    Other recommendations for next visit with HF clinic at home would be to do the following:   Increase entresto to  mg twice a day   Reduce bumetanide to 3 days/week   Consider replacing potassium with spironolactone 25 mg once a day

## 2019-03-21 ENCOUNTER — PATIENT OUTREACH (OUTPATIENT)
Dept: ADMINISTRATIVE | Facility: CLINIC | Age: 34
End: 2019-03-21

## 2019-03-21 NOTE — PATIENT INSTRUCTIONS
Discharge Instructions for Catheter Ablation  You have had a procedure called catheter ablation. It was used to treat an abnormal heartbeat (arrhythmia). This procedure destroyed (ablated) the cells in your heart that were causing your heart rhythm problem. During the procedure, the healthcare provider put a thin, flexible wire (catheter) into a blood vessel in your upper thigh. The provider then threaded it up to your heart.  Home care  Here are recommendations for care at home:   · You won't be able to drive yourself home because you had medicine to relax you (sedation) You will need to make arrangements for a ride. Your healthcare provider may tell you not to drive for 24 hours after the procedure.  · You should be able to go back to your normal daily activities in the next 1 to 2 days. These include walking, climbing stairs, and doing household chores.  · Don't do any heavy physical activity for several days after the procedure. This will allow your body to heal.  · Ask your healthcare provider when you can return to work.  · Take your temperature and check your incision for signs of infection every day for a week. Signs of infection include redness, swelling, drainage, or warmth at the incision site. It is normal to have a small bruise or lump where the catheter was inserted.  · Take your medicines exactly as directed. Dont skip doses. You may need to make some changes in your medicines because of the ablation procedure. Be sure to go over your medicine instructions with your healthcare provider before you are discharged.  · Learn to take your own pulse. Keep a record of your results. Ask your healthcare provider which readings mean that you need medical attention.  · Don't lift heavy objects for a period of time after your ablation. Ask your healthcare provider for specific advice.  Follow-up care  Make a follow-up appointment as directed by your healthcare provider. Your provider will check how your  incision site is healing. In many cases, one ablation is enough to treat an arrhythmia. But sometimes the problem comes back or another is found. If this happens, you may need a second procedure.  When to call your healthcare provider  Call your healthcare provider right away if you have any of the following:  · Redness, pain, swelling, bleeding, or drainage from your incision  · Chest pain, shortness of breath, or dizziness  · Temperature of 100.4°F (38.0°C) or higher, or as directed by your healthcare provider   · Sudden coldness, pain, or numbness in the leg or arm with the insertion site  · Nausea or vomiting  Note: Ask your healthcare provider what to expect about your heartbeat. Sometimes the irregularity goes away right after the procedure. Other times it may take longer to go away.   Date Last Reviewed: 10/1/2016  © 5873-7542 [employer]. [address] [place] [address]. All rights reserved. This information is not intended as a substitute for professional medical care. Always follow your healthcare professional's instructions.

## 2019-03-27 NOTE — PROGRESS NOTES
Subjective:     Patient ID:  Juan Stephens is a 33 y.o. male who presents for evaluation of Consult (CHF referred by Dr. Otilio Woody); Congestive Heart Failure; and Coronary Artery Disease    HPI:  This 33-year-old white male is the son of 1 of patients who had a heart transplant and subsequently  metastatic cancer this year.  He reports that in December he suffered a massive myocardial infarction and angiography demonstrated totally occluded right coronary artery.  The coronary stent, Synergy 3.5 x 12 drug-eluting stent was placed.  He had no warning prior to that heart attack and onset of chest pain occurred while showering.  He did well for approximately 2 weeks and then presented back to the hospital with acute congestive heart failure.  Prior to the 2018 hospitalization he was drinking half case of beer per week and also smoking 1 per day.  He quit both of these activities since January.    He wanted to be seen for his heart failure came to [place] March 15, 2019 and while driving developed recurrent chest pain.  He came to the emergency room and was subsequently taken to the cardiac catheterization laboratory ulcerated 90% narrowing was noted in the proximal 1st diagonal.  He underwent placement of drug-eluting stents 2 point by 22 and 2.5 x 8 placed in that region overlapping 1 another with no residual stenosis.  There was a 40% of the 3rd marginal branch.  Right coronary stent widely patent.  His left ventricular end-diastolic pressure was normal at 7 mm Hg.  An echocardiogram demonstrated estimated ejection fraction 45% and grade 1 diastolic dysfunction.  Wall motion was described as a globally hypokinetic.    Since this time he has done well with no return tightness pressure or heaviness in his chest neck arm throat or jaw.  No ORTIZ, orthopnea, PND, palpitations, pre-syncope or syncope.    Past Medical History:   Diagnosis Date    CAD (coronary artery disease) dEC 2019    SYNERGY  3.5X12 RCA STENT WITH IMI; LATER 2019 D1 STENTED SARAHI 2.5X22 AND 2.5X8 OVERLAPPING    CHF (congestive heart failure)     GERD (gastroesophageal reflux disease)     Hypertension     Inferior MI 2018    stent synergy 3.5x12 RCA [place]    Low serum HDL 3/20/2019       Past Surgical History:   Procedure Laterality Date    CORONARY ANGIOPLASTY WITH STENT PLACEMENT--see HPI 3/20/2019 clinic note for details      HERNIA REPAIR Left     INGUINAL    Left heart cath Left 3/18/2019    [facility]    TX LEFT HEART CATH,PERCUTANEOUS  2018    Cardiac Cath, Left Heart       Family History   Problem Relation Age of Onset    Heart failure Father     Cancer Father     Heart disease Father     No Known Problems Mother     Hypertension Brother      Social History     Socioeconomic History    Marital status:    Occupational History   Social Needs    Financial resource strain: None    Food insecurity:     Worry: None     Inability: None    Transportation needs:     Medical: None     Non-medical: None   Tobacco Use    Smoking status: Former Smoker     Packs/day: 1.00     Years: 15.00     Pack years: 15.00     Types: Cigarettes     Quit: 2018     Years since quittin.2    Smokeless tobacco: Current User     Types: Snuff   Substance and Sexual Activity    Alcohol use: Yes but quit 2019     Comment: 0.5 CASES BEER UP TO 2019    Drug use: No    Sexual activity: Yes     Partners: Female       Medication Sig    atorvastatin (LIPITOR) 40 MG tablet Take 1 tablet (40 mg total) by mouth every evening.    bumetanide (BUMEX) 2 MG tablet Take 2 mg by mouth once daily.    cetirizine (ZYRTEC) 10 MG tablet Take 10 mg by mouth once daily.    nitroGLYCERIN (NITROSTAT) 0.4 MG SL tablet Place 1 tablet (0.4 mg total) under the tongue every 5 (five) minutes as needed for Chest pain.    pantoprazole (PROTONIX) 20 MG tablet Take 20 mg by mouth  "once daily.    potassium chloride SA (K-DUR,KLOR-CON) 20 MEQ tablet Take 20 mEq by mouth once daily.    prasugrel (EFFIENT) 10 mg Tab Take 10 mg by mouth once daily.    sacubitril-valsartan (ENTRESTO) 49-51 mg per tablet Take 1 tablet by mouth 2 (two) times daily.    aspirin  325 MG tablet Take 1 tablet (325 mg total) by mouth once daily.    Carvedilol  Take 1 tablet (3.125 mg total) by mouth twice daily.     Review of patient's allergies indicates:  No Known Allergies    Review of Systems   Constitution: Negative for chills, decreased appetite, diaphoresis, fever, malaise/fatigue, night sweats, weight gain and weight loss.   HENT: Negative for ear discharge, ear pain, hearing loss and hoarse voice.    Eyes: Negative for photophobia and visual disturbance.   Cardiovascular: Negative for chest pain ( not at this time), dyspnea on exertion ( not at this time), irregular heartbeat, leg swelling, orthopnea, palpitations, paroxysmal nocturnal dyspnea and syncope.   Respiratory: Negative for cough, hemoptysis, shortness of breath (Not at time), sputum production and wheezing.    Endocrine: Negative for cold intolerance, heat intolerance, polydipsia and polyuria.   Hematologic/Lymphatic: Negative for bleeding problem. Does not bruise/bleed easily.   Musculoskeletal: Negative for arthritis, back pain and joint pain.   Gastrointestinal: Negative for abdominal pain, anorexia, change in bowel habit, dysphagia, heartburn, hematochezia and melena.   Genitourinary: Negative for dysuria, frequency and hematuria.   Neurological: Negative for brief paralysis, focal weakness, headaches, seizures and weakness.     Objective:   Physical Exam   Constitutional: He is oriented to person, place, and time. He appears well-developed and well-nourished. No distress.   BP (!) 93/59 (BP Location: Left arm, Patient Position: Sitting, BP Method: Medium (Automatic))   Pulse 96   Ht 5' 10" (1.778 m)   Wt 89.9 kg (198 lb 3.1 oz)   BMI 28.44 " kg/m²    HENT:   Head: Normocephalic and atraumatic.   Eyes: Conjunctivae are normal. Right eye exhibits no discharge. Left eye exhibits no discharge. No scleral icterus.   Neck: No JVD present. No thyromegaly present.   Cardiovascular: Normal rate, regular rhythm and normal heart sounds. Exam reveals no gallop and no friction rub.   No murmur heard.  Pulmonary/Chest: Effort normal and breath sounds normal.   Abdominal: Soft. Bowel sounds are normal. He exhibits no distension and no mass. There is no tenderness. There is no rebound and no guarding.   Musculoskeletal: He exhibits no edema or tenderness.   Neurological: He is alert and oriented to person, place, and time.   Skin: Skin is warm and dry. He is not diaphoretic.   Psychiatric: He has a normal mood and affect. His behavior is normal. Judgment and thought content normal.      Assessment:     1. Coronary artery disease involving native coronary artery of native heart without angina pectoris    2. Chronic combined systolic and diastolic congestive heart failure    3. Low serum HDL    4. Essential hypertension    5. Inferior MI      Plan:   I recommended changing the carvedilol to Toprol 50 mg once daily with planned at titrate maximum dose of 200 mg as long as his resting heart rate is above 60-70 beats minute.  Changing to Toprol should allow additional blood pressure for up titration of Entresto to the maximum dose of  mg twice daily.  I elected to make that change today so that when he sees Dr. Ramos in couple of weeks he should have enough blood pressure room to up titrate Entresto and can further up titrate this medication as described above.  I would recommend cardiac rehab  Importance of continuing to stay off cigarettes and not being exposed to cigarettes was stressed to the patient and his wife and they have full understanding of this and good motivation to do so  This time he does not require follow-up with [facility] of course we more than happy  to see him at any time  He will continue on care of his cardiologist at home, Dr. Donnell Ramos      Patient instruction sheet:  Change to aspirin 81 mg qd--bolivar not ecotrin    Change carvedilol to metoprolol succinate 50 mg once daily  They will gradually go up at home to 200 mg a day    Other recommendations for next visit with HF clinic at home would be to do the following:   Increase entresto to  mg twice a day   Reduce bumetanide to 3 days/week   Consider replacing potassium with spironolactone 25 mg once a day    Marshall Cevallos MD, [facility], [facility]  [phone] extension #[phone]  [facility] [phone]    Medical Director, Cardiomyopathy and Heart Failure Program

## 2019-03-29 NOTE — DISCHARGE SUMMARY
"[facility] Hospital Medicine  Discharge Summary      Patient Name: Juan Stephens  MRN: [id number]  Admission Date: 3/15/2019  Hospital Length of Stay: 4 days  Discharge Date and Time:  03/19/2019 12:56 AM  Attending Physician: Get Arellano MD  Discharging Provider: Dolores Escamilla NP  Primary Care Provider: Sanjuanita Smith MD  Hospital Medicine Team: [facility] HOSP MED J Dolores Escamilla NP    HPI:   Mr. Stephens is a 33 year old male with past medical history of CHF (on Bumex), CAD, GERD, hypertension and MI (12/30/18) who presented to the Emergency Department for chest pain, he reports pain started last night while driving and felt like a pressure in his chest; stating "like someone was sitting on me".  He denied radiation of the pain and reports it can be as bad as 10/10 but at time of presentation is 5/10. He notes that is anginal equivalent is crushing chest pain substantially more severe than what he reports today. He denies taking any medications for the pain. He denies any headache, shortness of breath, fever or chills.  Pt states he is compliant with all medications and took his home  mg PTA. He reports prior MI in December was treated with one "right sided stent" which was placed at [facility]. He is independent in ADLs, employed, and lives outside of [place]. He denies tobacco or illicit drug use, social ETOH. He has a strong family history of cardiac disease, father had severe CHF and required heart transplant. Mr. Stephens reports his mother began reaching out on his behalf to be see in [facility] HTS clinic yesterday, leaving a message with their staff, however has not heard back about the referral as of yet. All past medical, social, and family history reviewed.     In the ED, CBC and chemistry unremarkable, BNP WNL, no evidence of volume overload on physical exam, troponin negative, CXR without acute findings, and EKG with NSR, left axis deviation, and " inferolateral infarct (no priors for comparison). The patient was placed in observation to the Hospital Medicine Service for further evaluation and treatment.     Procedure(s) (LRB):  Left heart cath (Left)      Hospital Course:   Mr. Stephens was admitted 3/15 due to chest pain and placed on ACS protocol per Cardiology recommendations. He remained chest pain free since admission. Troponin trended noting 0.020 >> 0.050 >> 0.012 >> 0.027 >> 0.023. He was continued on home ASA, prasugrel (re-loaded at admission), statin, carvedilol, entresto, and bumex with initiation of heparin gtt per ACS protocol. TTE with EF 45%, grade I DD, mild global hypokinetic motion, normal RV function, CVP 3, and PASAP 19. Interventional Cardiology consulted and underwent LHC 3/18 with noted ulcerated 90% narrowing in the proximal 1st diagonal; underwent placement of drug-eluting stents 2 point by 22 and 2.5 x 8 placed in that region overlapping 1 another with no residual stenosis; 40% of the 3rd marginal branch; and right coronary stent was widely patent. Remained stable post cath and was discharged home with spouse, discharged education provided, outpt follow up with [facility] clinic per patient request.      Consults:   Consults (From admission, onward)        Status Ordering Provider     Inpatient consult to Interventional Cardiology  Once     Provider:  (Not yet assigned)    Completed DIRK WELDON        * Chest pain-resolved as of 3/18/2019  -placed in observation 3/15 due to chest pain  -in the ED, CBC and chemistry unremarkable, BNP WNL, no evidence of volume overload on physical exam, troponin negative, CXR without acute findings, and EKG with NSR, left axis deviation, and inferolateral infarct (no priors for comparison)  -chest pain free over hospital course  -troponin trend 0.020 >> 0.050 >> 0.012 >> 0.027 >> 0.023   -after initial increase case discussed with ED Cards fellow >> ACS protocol initiated  -continued home ASA,  prasugrel (loaded), statin, carvedilol, entresto, bumex, with initiation of heparin gtt per protocol  -TTE with EF 45%, grade I DD, mild global hypokinetic motion, normal RV function, CVP 3, and PASAP 19 >> no priors for comparsion  -Interventional Cardiology consulted and underwent LHC 3/18 with noted ulcerated 90% narrowing in the proximal 1st diagonal; underwent placement of drug-eluting stents 2 point by 22 and 2.5 x 8 placed in that region overlapping 1 another with no residual stenosis; 40% of the 3rd marginal branch; and right coronary stent was widely patent  -no events on tele   -SL NTG PRN chest pain  -remained stable post cath and was discharged home with spouse, discharged education provided, outpt follow up with hospitals clinic per patient request scheduled  -cardiac diet encouraged  -Cardiac Rehab referral placed    Coronary artery disease involving native coronary artery of native heart without angina pectoris  -see above and HPI    NSTEMI (non-ST elevated myocardial infarction)-resolved as of 3/20/2019  -see above     Acute on chronic congestive heart failure-resolved as of 3/20/2019  -euvolemic on exam  -CXR and BNP unremarkable  -continue home bumex, carvedilol, and entresto  -cardiac/low sodium diet recommended with daily weights     Essential hypertension  -BP controlled, continue medications as above    GERD (gastroesophageal reflux disease)  -no acute issues, chronic  -continue home PPI      Final Active Diagnoses:    Diagnosis Date Noted POA    Coronary artery disease involving native coronary artery of native heart without angina pectoris [I25.10] 03/15/2019 Unknown    Essential hypertension [I10] 03/15/2019 Unknown    GERD (gastroesophageal reflux disease) [K21.9] 03/15/2019 Unknown      Problems Resolved During this Admission:    Diagnosis Date Noted Date Resolved POA    PRINCIPAL PROBLEM:  Chest pain [R07.9] 03/15/2019 03/18/2019 Unknown    NSTEMI (non-ST elevated myocardial infarction)  [I21.4]  03/20/2019 Yes    Acute on chronic congestive heart failure [I50.9] 03/15/2019 03/20/2019 Yes     Discharged Condition: good    Disposition: Home or Self Care    Follow Up:  Follow-up Information     Marshall Cevallos Jr, MD. Schedule an appointment as soon as possible for a visit in 1 week.    Specialties:  Transplant, Cardiology  Why:  to follow up on recent stents and heart failure monitoring   Contact information:  [address]  [facility] [address]  [phone]                 Patient Instructions:      Diet Cardiac     Notify your health care provider if you experience any of the following:  temperature >100.4     Notify your health care provider if you experience any of the following:  persistent nausea and vomiting or diarrhea     Notify your health care provider if you experience any of the following:  severe uncontrolled pain     Notify your health care provider if you experience any of the following:  redness, tenderness, or signs of infection (pain, swelling, redness, odor or green/yellow discharge around incision site)     Notify your health care provider if you experience any of the following:  difficulty breathing or increased cough     Notify your health care provider if you experience any of the following:  severe persistent headache     Notify your health care provider if you experience any of the following:  persistent dizziness, light-headedness, or visual disturbances     Notify your health care provider if you experience any of the following:  increased confusion or weakness     Activity as tolerated     Review of Systems   Constitutional: Negative for activity change, appetite change, chills, diaphoresis, fatigue, fever and unexpected weight change.   HENT: Negative for congestion, sinus pressure, sinus pain, sneezing, sore throat, trouble swallowing and voice change.    Respiratory: Negative for cough, chest tightness, shortness of breath, wheezing and stridor.     Cardiovascular: Negative for chest pain.   Gastrointestinal: Negative for abdominal distention, abdominal pain, constipation, diarrhea, nausea and vomiting.   Genitourinary: Negative for decreased urine volume, difficulty urinating, hematuria and urgency.   Musculoskeletal: Negative for arthralgias, back pain, gait problem, joint swelling, myalgias and neck pain.   Skin: Negative for color change, pallor, rash and wound.   Neurological: Negative for dizziness, syncope, weakness and light-headedness.     Physical Exam   Constitutional: He is oriented to person, place, and time. He appears well-developed and well-nourished. No distress.   HENT:   Head: Normocephalic and atraumatic.   Mouth/Throat: Mucous membranes are normal. Normal dentition. No oropharyngeal exudate.   Eyes: Conjunctivae and lids are normal. Pupils are equal, round, and reactive to light. No scleral icterus.   Neck: Normal range of motion. Neck supple. No JVD present.   Cardiovascular: Normal rate, regular rhythm, normal heart sounds and intact distal pulses. Exam reveals no gallop and no friction rub.   No murmur heard.  Pulmonary/Chest: Effort normal and breath sounds normal. No respiratory distress. He has no wheezes. He exhibits no tenderness.   Abdominal: Soft. Bowel sounds are normal. He exhibits no distension. There is no tenderness.   Musculoskeletal: Normal range of motion. He exhibits no edema, tenderness or deformity.   Neurological: He is alert and oriented to person, place, and time. No cranial nerve deficit.   Skin: Skin is warm and dry. Capillary refill takes less than 2 seconds. No rash noted. He is not diaphoretic. No erythema.   Psychiatric: He has a normal mood and affect. Judgment and thought content normal.   Nursing note and vitals reviewed.    Significant Diagnostic Studies:   Labs:  Results for ERIN SOTOMAYOR (MRN [id number]) as of 3/29/2019 13:39   Ref. Range 3/18/2019 05:19   WBC Latest Ref Range: 3.90 - 12.70 K/uL 5.47    RBC Latest Ref Range: 4.60 - 6.20 M/uL 4.96   Hemoglobin Latest Ref Range: 14.0 - 18.0 g/dL 15.0   Hematocrit Latest Ref Range: 40.0 - 54.0 % 44.1   MCV Latest Ref Range: 82 - 98 fL 89   MCH Latest Ref Range: 27.0 - 31.0 pg 30.2   MCHC Latest Ref Range: 32.0 - 36.0 g/dL 34.0   RDW Latest Ref Range: 11.5 - 14.5 % 11.8   Platelets Latest Ref Range: 150 - 350 K/uL 165   MPV Latest Ref Range: 9.2 - 12.9 fL 10.4   Gran% Latest Ref Range: 38.0 - 73.0 % 40.6   Gran # (ANC) Latest Ref Range: 1.8 - 7.7 K/uL 2.2   Immature Grans (Abs) Latest Ref Range: 0.00 - 0.04 K/uL 0.01   Lymph% Latest Ref Range: 18.0 - 48.0 % 43.7   Lymph # Latest Ref Range: 1.0 - 4.8 K/uL 2.4   Mono% Latest Ref Range: 4.0 - 15.0 % 7.3   Mono # Latest Ref Range: 0.3 - 1.0 K/uL 0.4   Eosinophil% Latest Ref Range: 0.0 - 8.0 % 6.9   Eos # Latest Ref Range: 0.0 - 0.5 K/uL 0.4   Basophil% Latest Ref Range: 0.0 - 1.9 % 1.3   Baso # Latest Ref Range: 0.00 - 0.20 K/uL 0.07   nRBC Latest Ref Range: 0 /100 WBC 0   Differential Method Unknown Automated   Immature Granulocytes Latest Ref Range: 0.0 - 0.5 % 0.2   aPTT Latest Ref Range: 21.0 - 32.0 sec 51.5 (H)   Sodium Latest Ref Range: 136 - 145 mmol/L 139   Potassium Latest Ref Range: 3.5 - 5.1 mmol/L 3.8   Chloride Latest Ref Range: 95 - 110 mmol/L 103   CO2 Latest Ref Range: 23 - 29 mmol/L 26   Anion Gap Latest Ref Range: 8 - 16 mmol/L 10   BUN, Bld Latest Ref Range: 6 - 20 mg/dL 23 (H)   Creatinine Latest Ref Range: 0.5 - 1.4 mg/dL 1.0   eGFR if non African American Latest Ref Range: >60 mL/min/1.73 m^2 >60.0   eGFR if African American Latest Ref Range: >60 mL/min/1.73 m^2 >60.0   Glucose Latest Ref Range: 70 - 110 mg/dL 94   Calcium Latest Ref Range: 8.7 - 10.5 mg/dL 10.0   Magnesium Latest Ref Range: 1.6 - 2.6 mg/dL 2.1      All labs within the past 24 hours have been reviewed    Pending Diagnostic Studies:     None         Medications:  Reconciled Home Medications:      Medication List      START taking  these medications    nitroGLYCERIN 0.4 MG SL tablet  Commonly known as:  NITROSTAT  Place 1 tablet (0.4 mg total) under the tongue every 5 (five) minutes as needed for Chest pain.        CONTINUE taking these medications    bumetanide 2 MG tablet  Commonly known as:  BUMEX  Take 2 mg by mouth once daily.     cetirizine 10 MG tablet  Commonly known as:  ZYRTEC  Take 10 mg by mouth once daily.     ENTRESTO 49-51 mg per tablet  Generic drug:  sacubitril-valsartan  Take 1 tablet by mouth 2 (two) times daily.     pantoprazole 20 MG tablet  Commonly known as:  PROTONIX  Take 20 mg by mouth once daily.     prasugrel 10 mg Tab  Commonly known as:  EFFIENT  Take 10 mg by mouth once daily.          Indwelling Lines/Drains at time of discharge:   Lines/Drains/Airways          None        Time spent on the discharge of patient: 35 minutes  Patient was seen and examined on the date of discharge and determined to be suitable for discharge.      Dolores Escamilla, DNP, AG-ACNP, BC  Department of Hospital Medicine  [facility]

## 2019-03-29 NOTE — ASSESSMENT & PLAN NOTE
-placed in observation 3/15 due to chest pain  -in the ED, CBC and chemistry unremarkable, BNP WNL, no evidence of volume overload on physical exam, troponin negative, CXR without acute findings, and EKG with NSR, left axis deviation, and inferolateral infarct (no priors for comparison)  -chest pain free over hospital course  -troponin trend 0.020 >> 0.050 >> 0.012 >> 0.027 >> 0.023   -after initial increase case discussed with ED Cards fellow >> ACS protocol initiated  -continued home ASA, prasugrel (loaded), statin, carvedilol, entresto, bumex, with initiation of heparin gtt per protocol  -TTE with EF 45%, grade I DD, mild global hypokinetic motion, normal RV function, CVP 3, and PASAP 19 >> no priors for comparsion  -Interventional Cardiology consulted and underwent LHC 3/18 with noted ulcerated 90% narrowing in the proximal 1st diagonal; underwent placement of drug-eluting stents 2 point by 22 and 2.5 x 8 placed in that region overlapping 1 another with no residual stenosis; 40% of the 3rd marginal branch; and right coronary stent was widely patent  -no events on tele   -SL NTG PRN chest pain  -remained stable post cath and was discharged home with spouse, discharged education provided, outpt follow up with [facility] clinic per patient request scheduled  -cardiac diet encouraged  -Cardiac Rehab referral placed

## 2019-03-29 NOTE — ASSESSMENT & PLAN NOTE
-euvolemic on exam  -CXR and BNP unremarkable  -continue home bumex, carvedilol, and entresto  -cardiac/low sodium diet recommended with daily weights

## 2019-05-06 ENCOUNTER — TELEPHONE (OUTPATIENT)
Dept: TRANSPLANT | Facility: CLINIC | Age: 34
End: 2019-05-06

## 2019-05-06 NOTE — TELEPHONE ENCOUNTER
----- Message from Minerva Villalta sent at 5/6/2019  1:44 PM CDT -----  Contact: Mother  .Needs Advice    Reason for call: Pt stilling feeling same way before the stents & is retaining fluid; asking if can get an appt w/Dr Cevallos        Communication Preference:  [phone]    Additional Information:

## 2019-05-07 ENCOUNTER — TELEPHONE (OUTPATIENT)
Dept: TRANSPLANT | Facility: CLINIC | Age: 34
End: 2019-05-07

## 2019-05-07 DIAGNOSIS — I50.42 CHRONIC COMBINED SYSTOLIC AND DIASTOLIC CONGESTIVE HEART FAILURE: Primary | ICD-10-CM

## 2019-05-07 NOTE — TELEPHONE ENCOUNTER
"Wife called yesterday with concerns that pts been waking up sob or jumping up sob, and this only occurs when he's lying down or asleep.   I could hear pt in back ground. She says pt describes feeling " heart syncing, dropping).  Wife says pt says it last about an hours at times then goes away. She say spt denies  Wt gain or sob at any other times, and only had  Swelling once last week. Wife says he last saw his local card about 5- 6 weeks ago and mentioned this to him but they are ignoring. Pt scheduled by [facility] for clinic this week. CHF MA spoke to pts wife and they agreed to labs and appt.  "

## 2019-05-08 ENCOUNTER — OFFICE VISIT (OUTPATIENT)
Dept: TRANSPLANT | Facility: CLINIC | Age: 34
End: 2019-05-08
Payer: MEDICAID

## 2019-05-08 VITALS
DIASTOLIC BLOOD PRESSURE: 66 MMHG | HEART RATE: 80 BPM | HEIGHT: 70 IN | WEIGHT: 201.94 LBS | SYSTOLIC BLOOD PRESSURE: 86 MMHG | BODY MASS INDEX: 28.91 KG/M2

## 2019-05-08 DIAGNOSIS — I50.42 CHRONIC COMBINED SYSTOLIC AND DIASTOLIC CONGESTIVE HEART FAILURE: Primary | ICD-10-CM

## 2019-05-08 DIAGNOSIS — I25.10 CORONARY ARTERY DISEASE, ANGINA PRESENCE UNSPECIFIED, UNSPECIFIED VESSEL OR LESION TYPE, UNSPECIFIED WHETHER NATIVE OR TRANSPLANTED HEART: ICD-10-CM

## 2019-05-08 DIAGNOSIS — I10 ESSENTIAL HYPERTENSION: ICD-10-CM

## 2019-05-08 PROCEDURE — 99214 PR OFFICE/OUTPT VISIT, EST, LEVL IV, 30-39 MIN: ICD-10-PCS | Mod: S$PBB,,, | Performed by: INTERNAL MEDICINE

## 2019-05-08 PROCEDURE — 99999 PR PBB SHADOW E&M-EST. PATIENT-LVL III: CPT | Mod: PBBFAC,,, | Performed by: INTERNAL MEDICINE

## 2019-05-08 PROCEDURE — 99999 PR PBB SHADOW E&M-EST. PATIENT-LVL III: ICD-10-PCS | Mod: PBBFAC,,, | Performed by: INTERNAL MEDICINE

## 2019-05-08 PROCEDURE — 99214 OFFICE O/P EST MOD 30 MIN: CPT | Mod: S$PBB,,, | Performed by: INTERNAL MEDICINE

## 2019-05-08 PROCEDURE — 99213 OFFICE O/P EST LOW 20 MIN: CPT | Mod: PBBFAC | Performed by: INTERNAL MEDICINE

## 2019-05-08 RX ORDER — BUMETANIDE 1 MG/1
1 TABLET ORAL
Qty: 30 TABLET | Refills: 11 | Status: SHIPPED | OUTPATIENT
Start: 2019-05-08 | End: 2020-12-15

## 2019-05-08 NOTE — LETTER
May 8, 2019        Jt Alejandre MD  [address]  [facility] [address]             [facility] [address]  [facility] [address]  Phone: [phone]   Patient: Juan Stephens   MR Number: [id number]   YOB: 1985   Date of Visit: 5/8/2019       Dear Dr. Alejandre:    Thank you for referring Juan Stephens to me for evaluation. Attached you will find relevant portions of my assessment and plan of care.    If you have questions, please do not hesitate to call me. I look forward to following Juan Stephens along with you.    Sincerely,      Maris Hilton MD            CC  No Recipients    Enclosure

## 2019-05-08 NOTE — PATIENT INSTRUCTIONS
Start cardiac rehab     Stay off cigarettes (so proud of you!)    Move metoprolol to evening    Want him to try 1mg bumex prn instead of 2mg again given his low BP and low BNP    Dr Jt Alejandre   [phone]

## 2019-05-08 NOTE — PROGRESS NOTES
Subjective:     Patient ID:  Juan Stephens is a 33 y.o. male who presents for follow-up of Congestive Heart Failure (c/o mild SOB and  strange feeling with his heart at night when trying to sleep)    HPI:  This 33-year-old white male is the son of 1 of patients who had a heart transplant and subsequently  metastatic cancer this year.  He reports that in December he suffered a massive myocardial infarction and angiography demonstrated totally occluded right coronary artery.  The coronary stent, Synergy 3.5 x 12 drug-eluting stent was placed.  He had no warning prior to that heart attack and onset of chest pain occurred while showering.  He did well for approximately 2 weeks and then presented back to the hospital with acute congestive heart failure.  Prior to the 2018 hospitalization he was drinking half case of beer per week and also smoking 1 per day.  He quit both of these activities since January.    He wanted to be seen for his heart failure came to [place] March 15, 2019 and while driving developed recurrent chest pain.  He came to the emergency room and was subsequently taken to the cardiac catheterization laboratory ulcerated 90% narrowing was noted in the proximal 1st diagonal.  He underwent placement of drug-eluting stents 2 point by 22 and 2.5 x 8 placed in that region overlapping 1 another with no residual stenosis.  There was a 40% of the 3rd marginal branch.  Right coronary stent widely patent.  His left ventricular end-diastolic pressure was normal at 7 mm Hg.  An echocardiogram demonstrated estimated ejection fraction 45% and grade 1 diastolic dysfunction.  Wall motion was described as a globally hypokinetic.    Saw Dr Cevallos in march who made a few med changes and recommended local f/u- he saw Dr Bean and he didn't spend much time with him- pt says he's doing pretty good overall- says at night after he eats, when he goes to lay down/relax, feels his heart fluttering.  Will wake him up and keep him from falling asleep. Hasn't checked his bp at home in a while. With the low bp today she he feels a little Lh when he stands up fast. entresto is at 49-51 and toprol at 50 bumex is 2 mg 3-4 times  Aweek, if his wt is stable he wont even take it. Says he gets winded with any activity, though not as bad as it was before (used to have to hold onto the wall before he could his clothes on, better after last 2 stents were placed) no CP. Has a little swelling in his legs if he stands up for a long time    Last bumex was Sun and monday      Past Medical History:   Diagnosis Date    CAD (coronary artery disease) dEC 2019    SYNERGY 3.5X12 RCA STENT WITH IMI; LATER 2019 D1 STENTED SARAHI 2.5X22 AND 2.5X8 OVERLAPPING    CHF (congestive heart failure)     GERD (gastroesophageal reflux disease)     Hypertension     Inferior MI 2018    stent synergy 3.5x12 RCA [place]    Low serum HDL 3/20/2019       Past Surgical History:   Procedure Laterality Date    CORONARY ANGIOPLASTY WITH STENT PLACEMENT--see [facility] 3/20/2019 clinic note for details      HERNIA REPAIR Left     INGUINAL    Left heart cath Left 3/18/2019    Per[facility]    ID LEFT HEART CATH,PERCUTANEOUS  2018    Cardiac Cath, Left Heart       Family History   Problem Relation Age of Onset    Heart failure Father     Cancer Father     Heart disease Father     No Known Problems Mother     Hypertension Brother      Social History     Socioeconomic History    Marital status:    Occupational History   Social Needs    Financial resource strain: None    Food insecurity:     Worry: None     Inability: None    Transportation needs:     Medical: None     Non-medical: None   Tobacco Use    Smoking status: Former Smoker     Packs/day: 1.00     Years: 15.00     Pack years: 15.00     Types: Cigarettes     Quit: 2018     Years since quittin.2    Smokeless tobacco:  Current User     Types: Snuff   Substance and Sexual Activity    Alcohol use: Yes but quit Jan 2019     Comment: 0.5 CASES BEER UP TO JAN 2019    Drug use: No    Sexual activity: Yes     Partners: Female       Medication Sig    atorvastatin (LIPITOR) 40 MG tablet Take 1 tablet (40 mg total) by mouth every evening.    bumetanide (BUMEX) 2 MG tablet Take 2 mg by mouth once daily.    cetirizine (ZYRTEC) 10 MG tablet Take 10 mg by mouth once daily.    nitroGLYCERIN (NITROSTAT) 0.4 MG SL tablet Place 1 tablet (0.4 mg total) under the tongue every 5 (five) minutes as needed for Chest pain.    pantoprazole (PROTONIX) 20 MG tablet Take 20 mg by mouth once daily.    potassium chloride SA (K-DUR,KLOR-CON) 20 MEQ tablet Take 20 mEq by mouth once daily.    prasugrel (EFFIENT) 10 mg Tab Take 10 mg by mouth once daily.    sacubitril-valsartan (ENTRESTO) 49-51 mg per tablet Take 1 tablet by mouth 2 (two) times daily.    aspirin  325 MG tablet Take 1 tablet (325 mg total) by mouth once daily.    Carvedilol  Take 1 tablet (3.125 mg total) by mouth twice daily.     Review of patient's allergies indicates:  No Known Allergies    Review of Systems   Constitution: Negative for chills, decreased appetite, diaphoresis, fever, malaise/fatigue, night sweats, weight gain and weight loss.   HENT: Negative for ear discharge, ear pain, hearing loss and hoarse voice.    Eyes: Negative for photophobia and visual disturbance.   Cardiovascular: Positive for dyspnea on exertion ( not at this time). Negative for chest pain ( not at this time), irregular heartbeat, leg swelling, orthopnea, palpitations, paroxysmal nocturnal dyspnea and syncope.   Respiratory: Negative for cough, hemoptysis, shortness of breath (Not at time), sputum production and wheezing.    Endocrine: Negative for cold intolerance, heat intolerance, polydipsia and polyuria.   Hematologic/Lymphatic: Negative for bleeding problem. Does not bruise/bleed easily.  "  Musculoskeletal: Negative for arthritis, back pain and joint pain.   Gastrointestinal: Negative for abdominal pain, anorexia, change in bowel habit, dysphagia, heartburn, hematochezia and melena.   Genitourinary: Negative for dysuria, frequency and hematuria.   Neurological: Positive for light-headedness. Negative for brief paralysis, focal weakness, headaches, seizures and weakness.     Objective: BP (!) 86/66 (BP Location: Left arm, Patient Position: Sitting, BP Method: Medium (Automatic))   Pulse 80   Ht 5' 10" (1.778 m)   Wt 91.6 kg (201 lb 15.1 oz)   BMI 28.98 kg/m²      Physical Exam   Constitutional: He is oriented to person, place, and time. He appears well-developed and well-nourished. No distress.   HENT:   Head: Normocephalic and atraumatic.   Eyes: Conjunctivae are normal. Right eye exhibits no discharge. Left eye exhibits no discharge. No scleral icterus.   Neck: No JVD present. No thyromegaly present.   Cardiovascular: Normal rate, regular rhythm and normal heart sounds. Exam reveals no gallop and no friction rub.   No murmur heard.  Pulmonary/Chest: Effort normal and breath sounds normal.   Abdominal: Soft. Bowel sounds are normal. He exhibits no distension and no mass. There is no tenderness. There is no rebound and no guarding.   Musculoskeletal: He exhibits no edema or tenderness.   Neurological: He is alert and oriented to person, place, and time.   Skin: Skin is warm and dry. He is not diaphoretic.   Psychiatric: He has a normal mood and affect. His behavior is normal. Judgment and thought content normal.      Lab Results   Component Value Date    BNP 46 05/08/2019    BNP 46 05/08/2019     03/20/2019    K 4.1 03/20/2019    MG 2.0 03/20/2019     03/20/2019    CO2 29 03/20/2019    BUN 20 03/20/2019    CREATININE 1.1 03/20/2019     (H) 03/20/2019    HGBA1C 5.1 03/15/2019    AST 48 (H) 03/20/2019    ALT 78 (H) 03/20/2019    ALBUMIN 4.4 03/20/2019    PROT 7.8 03/20/2019    " BILITOT 0.6 03/20/2019    CHOL 124 03/15/2019    HDL 36 (L) 03/15/2019    LDLCALC 45.8 (L) 03/15/2019    TRIG 211 (H) 03/15/2019       Magnesium   Date Value Ref Range Status   03/20/2019 2.0 1.6 - 2.6 mg/dL Final       Lab Results   Component Value Date    WBC 5.47 03/18/2019    HGB 15.0 03/18/2019    HCT 44.1 03/18/2019    MCV 89 03/18/2019     03/18/2019       No results found for: INR, PROTIME    BNP   Date Value Ref Range Status   05/08/2019 46 0 - 99 pg/mL Final     Comment:     Values of less than 100 pg/ml are consistent with non-CHF populations.   05/08/2019 46 0 - 99 pg/mL Final     Comment:     Values of less than 100 pg/ml are consistent with non-CHF populations.   03/20/2019 38 0 - 99 pg/mL Final     Comment:     Values of less than 100 pg/ml are consistent with non-CHF populations.       No results found for: LDH        Assessment:     1. Chronic combined systolic and diastolic congestive heart failure    2. Coronary artery disease, angina presence unspecified, unspecified vessel or lesion type, unspecified whether native or transplanted heart    3. Essential hypertension      Plan:   Unfortunately no room given his BP/HR to further increase BB or entresto- euvolemic on exam today, low BNP.     Signed up for cardiac rehab and starts next week    Still off cigarettes (quit in January)- encouraged ongoing smoking cessation. Discussed triggers for initiation of VAD/OHT eval today including worsening FC, Heart failure hosp, end organ dysfxn and arrythmia. At this time he is too well.    Move metoprolol to evening given increased cardiac awareness and palps at bedtime    Want him to try 1mg bumex prn instead of 2mg again given his low BP and low BNP    Will refer him to Dr Alejandre so that he has a local cardiologist and plan to see him here about once a year to be sure he remains stable    Will see him back in 1 year unless he needs to be seen sooner (anticipate Dr Alejandre will let us know)  Gave pt  records to give to him and sent him a text to expect the referral.

## 2019-05-15 ENCOUNTER — TELEPHONE (OUTPATIENT)
Dept: TRANSPLANT | Facility: CLINIC | Age: 34
End: 2019-05-15

## 2019-05-15 NOTE — TELEPHONE ENCOUNTER
LVM for pts wife asking her to please call me  With Dr brian office #. Meanwhile I am faxing  Dr Hilton's last last echo labs and clinic note.

## 2019-09-23 DIAGNOSIS — I50.42 CHRONIC COMBINED SYSTOLIC AND DIASTOLIC CONGESTIVE HEART FAILURE: ICD-10-CM

## 2019-09-23 DIAGNOSIS — I21.19 INFERIOR MI: ICD-10-CM

## 2019-09-27 ENCOUNTER — TELEPHONE (OUTPATIENT)
Dept: TRANSPLANT | Facility: CLINIC | Age: 34
End: 2019-09-27

## 2019-09-27 RX ORDER — METOPROLOL SUCCINATE 100 MG/1
100 TABLET, EXTENDED RELEASE ORAL DAILY
Qty: 30 TABLET | Refills: 1 | Status: SHIPPED | OUTPATIENT
Start: 2019-09-27 | End: 2019-10-24 | Stop reason: SDUPTHER

## 2019-09-27 NOTE — TELEPHONE ENCOUNTER
I reviewed Dr. Hilton' clinic note and VS from May 2019  I would like to increase metoprolol succinate to 100 mg qd and see him in clinic (OK to add on) 2-6 weeks later to continue to up-titrate this therapy

## 2019-09-27 NOTE — TELEPHONE ENCOUNTER
"1040 am:  See Rx response note by Dr. Cevallos 9/27/19 0820 am:  Asking pt be called to increase Metoprolol and schedule for RTC visit.   Called pt at this time.  Male, Giorgio answered  Asked to speak with pt and was told he was outside working. I told him whom I am, with his doctors office and had important information to give him regarding medication. Again was told "he's outside working"  I asked if he would please take a message and have him call me.  Provided my name and office contact phone number.   1100 am: Wife returned my call, Stefania.  She confirmed pt is taking Metoprolol 50 mg once daily at bedtime.  Does not check bp's at home  Does check HR periodically because can do so with a watch and reported has been in the 70's. Said he feels good   No problems.   Reviewed Dr. Cevallos note and VS-noting have been 80's systolic and HR up to 80's  2:00 pm:  Re reviewed VS with Dr. Cevallos.  He wants to proceed with increase of Metoprolol to 100 mg Q hs as in his message because wants pt HR <80 consistantly.    2:10 pm:  Called and informed wife of this and for him to increase Metoprolol to 100 mg once daily and to continue to take at  Bedtime  For now can finish Rx he has and take (2) 50 mg tablets every night. INformed her Dr. Dougherty has already sent in a new prescription to their pharmacy in [place] for the 100 mg (1) tablet once daily -said she plans to pick this up Monday 9/30/19  Asked wife to let this office know if he begins to feel fatigue, light headed, tired, etc.   "

## 2019-10-03 DIAGNOSIS — I50.22 CHRONIC SYSTOLIC HEART FAILURE: Primary | ICD-10-CM

## 2019-10-24 ENCOUNTER — OFFICE VISIT (OUTPATIENT)
Dept: TRANSPLANT | Facility: CLINIC | Age: 34
End: 2019-10-24
Attending: INTERNAL MEDICINE
Payer: MEDICAID

## 2019-10-24 ENCOUNTER — LAB VISIT (OUTPATIENT)
Dept: LAB | Facility: HOSPITAL | Age: 34
End: 2019-10-24
Attending: INTERNAL MEDICINE
Payer: MEDICAID

## 2019-10-24 VITALS
DIASTOLIC BLOOD PRESSURE: 74 MMHG | BODY MASS INDEX: 30.3 KG/M2 | HEART RATE: 71 BPM | HEIGHT: 70 IN | SYSTOLIC BLOOD PRESSURE: 119 MMHG | WEIGHT: 211.63 LBS

## 2019-10-24 DIAGNOSIS — I50.42 CHRONIC COMBINED SYSTOLIC AND DIASTOLIC CONGESTIVE HEART FAILURE: Primary | ICD-10-CM

## 2019-10-24 DIAGNOSIS — I21.19 INFERIOR MI: ICD-10-CM

## 2019-10-24 DIAGNOSIS — I50.22 CHRONIC SYSTOLIC HEART FAILURE: ICD-10-CM

## 2019-10-24 DIAGNOSIS — R74.8 LOW SERUM HDL: ICD-10-CM

## 2019-10-24 DIAGNOSIS — I10 ESSENTIAL HYPERTENSION: ICD-10-CM

## 2019-10-24 DIAGNOSIS — I25.5 ISCHEMIC CARDIOMYOPATHY: ICD-10-CM

## 2019-10-24 DIAGNOSIS — I25.10 CORONARY ARTERY DISEASE INVOLVING NATIVE CORONARY ARTERY OF NATIVE HEART WITHOUT ANGINA PECTORIS: ICD-10-CM

## 2019-10-24 LAB
ALBUMIN SERPL BCP-MCNC: 4 G/DL (ref 3.5–5.2)
ALP SERPL-CCNC: 87 U/L (ref 55–135)
ALT SERPL W/O P-5'-P-CCNC: 31 U/L (ref 10–44)
ANION GAP SERPL CALC-SCNC: 6 MMOL/L (ref 8–16)
AST SERPL-CCNC: 18 U/L (ref 10–40)
BILIRUB SERPL-MCNC: 0.5 MG/DL (ref 0.1–1)
BNP SERPL-MCNC: 46 PG/ML (ref 0–99)
BUN SERPL-MCNC: 20 MG/DL (ref 6–20)
CALCIUM SERPL-MCNC: 9.1 MG/DL (ref 8.7–10.5)
CHLORIDE SERPL-SCNC: 106 MMOL/L (ref 95–110)
CHOLEST SERPL-MCNC: 119 MG/DL (ref 120–199)
CHOLEST/HDLC SERPL: 4.4 {RATIO} (ref 2–5)
CO2 SERPL-SCNC: 27 MMOL/L (ref 23–29)
CREAT SERPL-MCNC: 1 MG/DL (ref 0.5–1.4)
EST. GFR  (AFRICAN AMERICAN): >60 ML/MIN/1.73 M^2
EST. GFR  (NON AFRICAN AMERICAN): >60 ML/MIN/1.73 M^2
GLUCOSE SERPL-MCNC: 115 MG/DL (ref 70–110)
HDLC SERPL-MCNC: 27 MG/DL (ref 40–75)
HDLC SERPL: 22.7 % (ref 20–50)
LDLC SERPL CALC-MCNC: 34.4 MG/DL (ref 63–159)
MAGNESIUM SERPL-MCNC: 2.1 MG/DL (ref 1.6–2.6)
NONHDLC SERPL-MCNC: 92 MG/DL
POTASSIUM SERPL-SCNC: 4.2 MMOL/L (ref 3.5–5.1)
PROT SERPL-MCNC: 7.2 G/DL (ref 6–8.4)
SODIUM SERPL-SCNC: 139 MMOL/L (ref 136–145)
TRIGL SERPL-MCNC: 288 MG/DL (ref 30–150)

## 2019-10-24 PROCEDURE — 99214 PR OFFICE/OUTPT VISIT, EST, LEVL IV, 30-39 MIN: ICD-10-PCS | Mod: S$PBB,,, | Performed by: INTERNAL MEDICINE

## 2019-10-24 PROCEDURE — 99213 OFFICE O/P EST LOW 20 MIN: CPT | Mod: PBBFAC | Performed by: INTERNAL MEDICINE

## 2019-10-24 PROCEDURE — 99999 PR PBB SHADOW E&M-EST. PATIENT-LVL III: ICD-10-PCS | Mod: PBBFAC,,, | Performed by: INTERNAL MEDICINE

## 2019-10-24 PROCEDURE — 83880 ASSAY OF NATRIURETIC PEPTIDE: CPT

## 2019-10-24 PROCEDURE — 83735 ASSAY OF MAGNESIUM: CPT

## 2019-10-24 PROCEDURE — 99999 PR PBB SHADOW E&M-EST. PATIENT-LVL III: CPT | Mod: PBBFAC,,, | Performed by: INTERNAL MEDICINE

## 2019-10-24 PROCEDURE — 80061 LIPID PANEL: CPT

## 2019-10-24 PROCEDURE — 80053 COMPREHEN METABOLIC PANEL: CPT

## 2019-10-24 PROCEDURE — 99214 OFFICE O/P EST MOD 30 MIN: CPT | Mod: S$PBB,,, | Performed by: INTERNAL MEDICINE

## 2019-10-24 PROCEDURE — 36415 COLL VENOUS BLD VENIPUNCTURE: CPT

## 2019-10-24 RX ORDER — METOPROLOL SUCCINATE 200 MG/1
200 TABLET, EXTENDED RELEASE ORAL DAILY
Qty: 30 TABLET | Refills: 5 | Status: SHIPPED | OUTPATIENT
Start: 2019-10-24 | End: 2020-04-15

## 2019-10-24 RX ORDER — DEXLANSOPRAZOLE 60 MG/1
30 CAPSULE, DELAYED RELEASE ORAL DAILY
COMMUNITY
End: 2020-06-02 | Stop reason: DRUGHIGH

## 2019-10-24 NOTE — Clinical Note
BMP due around 11/21 and I sent him message via patient e mail to check on how tolerating med changes.  This is young man I wanted to up-titrate but didn't get done so please keep eye out so I can be sure done--he wasn't the problem

## 2019-11-04 ENCOUNTER — PATIENT MESSAGE (OUTPATIENT)
Dept: TRANSPLANT | Facility: CLINIC | Age: 34
End: 2019-11-04

## 2019-11-04 DIAGNOSIS — I10 ESSENTIAL HYPERTENSION: ICD-10-CM

## 2019-11-04 DIAGNOSIS — I50.42 CHRONIC COMBINED SYSTOLIC AND DIASTOLIC CONGESTIVE HEART FAILURE: Primary | ICD-10-CM

## 2019-11-04 DIAGNOSIS — I25.5 ISCHEMIC CARDIOMYOPATHY: ICD-10-CM

## 2019-11-04 DIAGNOSIS — R74.8 LOW SERUM HDL: ICD-10-CM

## 2019-11-04 DIAGNOSIS — I25.10 CORONARY ARTERY DISEASE INVOLVING NATIVE CORONARY ARTERY OF NATIVE HEART WITHOUT ANGINA PECTORIS: ICD-10-CM

## 2019-11-05 ENCOUNTER — TELEPHONE (OUTPATIENT)
Dept: TRANSPLANT | Facility: CLINIC | Age: 34
End: 2019-11-05

## 2019-11-05 NOTE — TELEPHONE ENCOUNTER
Juan, kept your chart open to see how you were doing with the medication changes made at last visit.  Did not want to call you this late but please let me know by contacting HF nurses.  Remember after you are on the Entresto  ng twice a day dose for 1 month get BMP (lab) for me to review.

## 2019-11-05 NOTE — ASSESSMENT & PLAN NOTE
work on exercise and weight loss for TG  Do not skip meals  Avoid corn syrup sweeteners  Restrict diet to no rice, no pasta, no bread, no potatoes and no man-made sweets.  You can consume all of the salads and vegetables that you want.  Limit meat to 4-6 ounces per day.  Do not skip meals.  Suggest oatmeal or eggs for breakfast and do not eat breakfast cereals  Regular walking program or if possible swim or walk in pool or other aerobic exercises working up to 45-60 min daily

## 2019-11-05 NOTE — PROGRESS NOTES
Subjective:     Patient ID:  Juan Stephens is a 34 y.o. male who presents for follow-up of Congestive Heart Failure; Cardiomyopathy; and Hyperlipidemia    HPI:  33 yo WM is the son of 1 of patients who had a heart transplant and subsequently  metastatic cancer in 2019.  He reports that in December he suffered a massive myocardial infarction and angiography demonstrated totally occluded right coronary artery.  The coronary stent, Synergy 3.5 x 12 drug-eluting stent was placed.  He had no warning prior to that heart attack and onset of chest pain occurred while showering.  He did well for approximately 2 weeks and then presented back to the hospital with acute congestive heart failure.  Prior to the 2018 hospitalization he was drinking half case of beer per week and also smoking 1 per day.  He quit both of these activities since 2019.    He wanted to be seen for his heart failure came to [place] March 15, 2019 and while driving developed recurrent chest pain.  He came to the emergency room and was subsequently taken to the cardiac catheterization laboratory ulcerated 90% narrowing was noted in the proximal 1st diagonal.  He underwent placement of drug-eluting stents 2 point by 22 and 2.5 x 8 placed in that region overlapping 1 another with no residual stenosis.  There was a 40% of the 3rd marginal branch.  Right coronary stent widely patent.  His left ventricular end-diastolic pressure was normal at 7 mm Hg.  An echocardiogram demonstrated estimated ejection fraction 45% and grade 1 diastolic dysfunction.  Wall motion was described as a globally hypokinetic.    He saw me 2019 and I recommended the following:  Change to aspirin 81 mg qd--bolivar not ecotrin  Change carvedilol to metoprolol succinate 50 mg once daily  They will gradually go up at home to 200 mg a day  Other recommendations for next visit with HF clinic at home would be to do the following:   Increase entresto to   "mg twice a day   Reduce bumetanide to 3 days/week   Consider replacing potassium with spironolactone 25 mg once a day    He saw Dr. Hilton May 2019 but no changes made in regimen.  I was contacted Sept 2019 re: refill and I reviewed Dr. Hilton' clinic note and VS from May 2019.  At that time I increased metoprolol succinate to 100 mg qd and he returns today for f/u.    He has not been seeing Dr. Bean and reports that he wants to come here for f/u and medication titration.  He is walking 10,000 steps/day, working full time.    He has fatigue, sleeping 3-4 hrs/night.    Some daytime sleepiness and is supposed to have sleep apnea study at home.    Some ORTIZ off and on getting out of shower and drying off but not otherwise.  Sleeping on 1 pillow but has HOB elevated for breathing--old.  No PND.  Occ palpitations but improved on higher dose of toprol.  Using bumex 2-3 days/week.    Review of Systems   Constitution: Positive for malaise/fatigue and weight gain (10# this year). Negative for chills, fever, night sweats and weight loss.   Cardiovascular: Positive for dyspnea on exertion, orthopnea and palpitations. Negative for chest pain, irregular heartbeat, leg swelling, near-syncope, paroxysmal nocturnal dyspnea and syncope.   Respiratory: Positive for sleep disturbances due to breathing. Negative for cough, sputum production and wheezing.    Hematologic/Lymphatic: Does not bruise/bleed easily.   Musculoskeletal: Negative for arthritis, joint pain and stiffness.   Gastrointestinal: Negative for hematochezia and melena.   Genitourinary: Negative for hematuria.   Neurological: Negative for brief paralysis, focal weakness, seizures and weakness.     Objective:   Physical Exam   Constitutional: He is oriented to person, place, and time. He appears well-developed and well-nourished. No distress.   /74 (BP Location: Left arm, Patient Position: Sitting, BP Method: Large (Automatic))   Pulse 71   Ht 5' 10" (1.778 " m)   Wt 96 kg (211 lb 10.3 oz)   BMI 30.37 kg/m²    HENT:   Head: Normocephalic and atraumatic.   Eyes: Conjunctivae are normal. Right eye exhibits no discharge. Left eye exhibits no discharge. No scleral icterus.   Neck: No JVD present. No thyromegaly present.   Cardiovascular: Normal rate, regular rhythm and normal heart sounds. Exam reveals no gallop.   No murmur heard.  Pulmonary/Chest: Effort normal and breath sounds normal.   Abdominal: Soft. Bowel sounds are normal. He exhibits no distension and no mass. There is no tenderness. There is no rebound and no guarding.   Musculoskeletal: He exhibits no edema or tenderness.   Neurological: He is alert and oriented to person, place, and time.   Skin: Skin is warm and dry. He is not diaphoretic.   Psychiatric: He has a normal mood and affect. His behavior is normal. Judgment and thought content normal.      Lab Results   Component Value Date    BNP 46 10/24/2019     10/24/2019    K 4.2 10/24/2019    MG 2.1 10/24/2019     10/24/2019    CO2 27 10/24/2019    BUN 20 10/24/2019    CREATININE 1.0 10/24/2019     (H) 10/24/2019    HGBA1C 5.1 03/15/2019    AST 18 10/24/2019    ALT 31 10/24/2019    ALBUMIN 4.0 10/24/2019    PROT 7.2 10/24/2019    BILITOT 0.5 10/24/2019    WBC 5.47 03/18/2019    HGB 15.0 03/18/2019    HCT 44.1 03/18/2019     03/18/2019    CHOL 119 (L) 10/24/2019    HDL 27 (L) 10/24/2019    LDLCALC 34.4 (L) 10/24/2019    TRIG 288 (H) 10/24/2019       Assessment:     1. Chronic combined systolic and diastolic congestive heart failure    2. Ischemic cardiomyopathy    3. Low serum HDL    4. Essential hypertension    5. Coronary artery disease involving native coronary artery of native heart without angina pectoris    6. Inferior MI      Plan:   Obtain lipids with today's CMP, BNP  Increase Entresto to  mg BID  Obtain BMP on increase Entresto dose in 1 month  Would like to have on aldactone but this is less critical now  Increase  metoprolol succinate to 200 mg qd--separate entresto and toprol dose increase by 1-2 weeks and he can choose order--will see which script he runs out of first to make the first change.  RTC 6 months with BMP    ADDENDUM 10/27/2019 5 PM:  Goal for non-HDL chol below 100 and LDL below 70 met  Reviewed over phone today and he will work on exercise and weight loss for TG  Do not skip meals  Avoid corn syrup sweeteners  Restrict diet to no rice, no pasta, no bread, no potatoes and no man-made sweets.  You can consume all of the salads and vegetables that you want.  Limit meat to 4-6 ounces per day.  Do not skip meals.  Suggest oatmeal or eggs for breakfast and do not eat breakfast cereals  Regular walking program or if possible swim or walk in pool or other aerobic exercises working up to 45-60 min daily    Problem List Items Addressed This Visit        1 - High    Chronic combined systolic and diastolic congestive heart failure - Primary    Current Assessment & Plan     GDMT with metoprolol, entresto  Would like to get onto aldactone but less critical at this time pushing entresto         Relevant Medications    sacubitril-valsartan (ENTRESTO)  mg per tablet    metoprolol succinate (TOPROL-XL) 200 MG 24 hr tablet    Other Relevant Orders    Basic metabolic panel    Basic metabolic panel    Coronary artery disease involving native coronary artery of native heart without angina pectoris    Current Assessment & Plan     Continue BB, ARB portion of entresto  Work on risk factor modification  Continue ASA 81 mg qd, statin           Relevant Medications    metoprolol succinate (TOPROL-XL) 200 MG 24 hr tablet    Other Relevant Orders    Lipid panel    Essential hypertension    Current Assessment & Plan     BP controlled  Continue ARB, BB  Target BP < 120/80         Relevant Orders    Basic metabolic panel    Basic metabolic panel    Ischemic cardiomyopathy    Current Assessment & Plan     Work onto target dose of toprol  and valsartan portion of Entresto            2     Low serum HDL    Current Assessment & Plan     work on exercise and weight loss for TG  Do not skip meals  Avoid corn syrup sweeteners  Restrict diet to no rice, no pasta, no bread, no potatoes and no man-made sweets.  You can consume all of the salads and vegetables that you want.  Limit meat to 4-6 ounces per day.  Do not skip meals.  Suggest oatmeal or eggs for breakfast and do not eat breakfast cereals  Regular walking program or if possible swim or walk in pool or other aerobic exercises working up to 45-60 min daily         Relevant Orders    Lipid panel       3     Inferior MI    Overview     stent synergy 3.5x12 RCA [place], [place]         Relevant Medications    metoprolol succinate (TOPROL-XL) 200 MG 24 hr tablet

## 2019-11-05 NOTE — ASSESSMENT & PLAN NOTE
Continue BB, ARB portion of entresto  Work on risk factor modification  Continue ASA 81 mg qd, statin

## 2019-11-05 NOTE — ASSESSMENT & PLAN NOTE
GDMT with metoprolol, entresto  Would like to get onto aldactone but less critical at this time pushing entresto

## 2019-11-21 ENCOUNTER — LAB VISIT (OUTPATIENT)
Dept: LAB | Facility: HOSPITAL | Age: 34
End: 2019-11-21
Attending: INTERNAL MEDICINE
Payer: MEDICAID

## 2019-11-21 DIAGNOSIS — I10 ESSENTIAL HYPERTENSION: ICD-10-CM

## 2019-11-21 DIAGNOSIS — I50.42 CHRONIC COMBINED SYSTOLIC AND DIASTOLIC CONGESTIVE HEART FAILURE: ICD-10-CM

## 2019-11-21 LAB
ANION GAP SERPL CALC-SCNC: 11 MMOL/L (ref 8–16)
BUN SERPL-MCNC: 15 MG/DL (ref 6–20)
CALCIUM SERPL-MCNC: 9.4 MG/DL (ref 8.7–10.5)
CHLORIDE SERPL-SCNC: 105 MMOL/L (ref 95–110)
CO2 SERPL-SCNC: 24 MMOL/L (ref 23–29)
CREAT SERPL-MCNC: 1.1 MG/DL (ref 0.5–1.4)
EST. GFR  (AFRICAN AMERICAN): >60 ML/MIN/1.73 M^2
EST. GFR  (NON AFRICAN AMERICAN): >60 ML/MIN/1.73 M^2
GLUCOSE SERPL-MCNC: 81 MG/DL (ref 70–110)
POTASSIUM SERPL-SCNC: 4.5 MMOL/L (ref 3.5–5.1)
SODIUM SERPL-SCNC: 140 MMOL/L (ref 136–145)

## 2019-11-21 PROCEDURE — 80048 BASIC METABOLIC PNL TOTAL CA: CPT

## 2019-11-21 PROCEDURE — 36415 COLL VENOUS BLD VENIPUNCTURE: CPT

## 2019-12-31 RX ORDER — ATORVASTATIN CALCIUM 40 MG/1
TABLET, FILM COATED ORAL
Qty: 30 TABLET | Refills: 5 | Status: SHIPPED | OUTPATIENT
Start: 2019-12-31 | End: 2020-06-22

## 2020-04-13 DIAGNOSIS — I25.10 CORONARY ARTERY DISEASE INVOLVING NATIVE CORONARY ARTERY OF NATIVE HEART WITHOUT ANGINA PECTORIS: ICD-10-CM

## 2020-04-13 DIAGNOSIS — I21.19 INFERIOR MI: ICD-10-CM

## 2020-04-13 DIAGNOSIS — I50.42 CHRONIC COMBINED SYSTOLIC AND DIASTOLIC CONGESTIVE HEART FAILURE: ICD-10-CM

## 2020-04-15 RX ORDER — METOPROLOL SUCCINATE 200 MG/1
TABLET, EXTENDED RELEASE ORAL
Qty: 30 TABLET | Refills: 5 | Status: SHIPPED | OUTPATIENT
Start: 2020-04-15 | End: 2020-09-28

## 2020-04-19 DIAGNOSIS — I50.42 CHRONIC COMBINED SYSTOLIC AND DIASTOLIC CONGESTIVE HEART FAILURE: ICD-10-CM

## 2020-04-20 RX ORDER — SACUBITRIL AND VALSARTAN 97; 103 MG/1; MG/1
TABLET, FILM COATED ORAL
Qty: 60 TABLET | Refills: 5 | Status: SHIPPED | OUTPATIENT
Start: 2020-04-20 | End: 2020-10-12

## 2020-06-02 ENCOUNTER — LAB VISIT (OUTPATIENT)
Dept: LAB | Facility: HOSPITAL | Age: 35
End: 2020-06-02
Attending: INTERNAL MEDICINE
Payer: MEDICAID

## 2020-06-02 ENCOUNTER — OFFICE VISIT (OUTPATIENT)
Dept: TRANSPLANT | Facility: CLINIC | Age: 35
End: 2020-06-02
Attending: INTERNAL MEDICINE
Payer: MEDICAID

## 2020-06-02 VITALS
SYSTOLIC BLOOD PRESSURE: 111 MMHG | HEIGHT: 70 IN | BODY MASS INDEX: 32.48 KG/M2 | HEART RATE: 61 BPM | WEIGHT: 226.88 LBS | DIASTOLIC BLOOD PRESSURE: 70 MMHG

## 2020-06-02 DIAGNOSIS — R74.8 LOW SERUM HDL: ICD-10-CM

## 2020-06-02 DIAGNOSIS — I50.42 CHRONIC COMBINED SYSTOLIC AND DIASTOLIC CONGESTIVE HEART FAILURE: Primary | ICD-10-CM

## 2020-06-02 DIAGNOSIS — I50.42 CHRONIC COMBINED SYSTOLIC AND DIASTOLIC CONGESTIVE HEART FAILURE: ICD-10-CM

## 2020-06-02 DIAGNOSIS — I10 ESSENTIAL HYPERTENSION: ICD-10-CM

## 2020-06-02 DIAGNOSIS — I25.5 ISCHEMIC CARDIOMYOPATHY: ICD-10-CM

## 2020-06-02 DIAGNOSIS — I25.10 CORONARY ARTERY DISEASE INVOLVING NATIVE CORONARY ARTERY OF NATIVE HEART WITHOUT ANGINA PECTORIS: ICD-10-CM

## 2020-06-02 LAB
ANION GAP SERPL CALC-SCNC: 6 MMOL/L (ref 8–16)
BUN SERPL-MCNC: 14 MG/DL (ref 6–20)
CALCIUM SERPL-MCNC: 9.2 MG/DL (ref 8.7–10.5)
CHLORIDE SERPL-SCNC: 108 MMOL/L (ref 95–110)
CO2 SERPL-SCNC: 25 MMOL/L (ref 23–29)
CREAT SERPL-MCNC: 1 MG/DL (ref 0.5–1.4)
EST. GFR  (AFRICAN AMERICAN): >60 ML/MIN/1.73 M^2
EST. GFR  (NON AFRICAN AMERICAN): >60 ML/MIN/1.73 M^2
GLUCOSE SERPL-MCNC: 83 MG/DL (ref 70–110)
POTASSIUM SERPL-SCNC: 4.3 MMOL/L (ref 3.5–5.1)
SODIUM SERPL-SCNC: 139 MMOL/L (ref 136–145)

## 2020-06-02 PROCEDURE — 99999 PR PBB SHADOW E&M-EST. PATIENT-LVL III: CPT | Mod: PBBFAC,,, | Performed by: INTERNAL MEDICINE

## 2020-06-02 PROCEDURE — 99214 PR OFFICE/OUTPT VISIT, EST, LEVL IV, 30-39 MIN: ICD-10-PCS | Mod: S$PBB,,, | Performed by: INTERNAL MEDICINE

## 2020-06-02 PROCEDURE — 99214 OFFICE O/P EST MOD 30 MIN: CPT | Mod: S$PBB,,, | Performed by: INTERNAL MEDICINE

## 2020-06-02 PROCEDURE — 99213 OFFICE O/P EST LOW 20 MIN: CPT | Mod: PBBFAC | Performed by: INTERNAL MEDICINE

## 2020-06-02 PROCEDURE — 36415 COLL VENOUS BLD VENIPUNCTURE: CPT

## 2020-06-02 PROCEDURE — 99999 PR PBB SHADOW E&M-EST. PATIENT-LVL III: ICD-10-PCS | Mod: PBBFAC,,, | Performed by: INTERNAL MEDICINE

## 2020-06-02 PROCEDURE — 80048 BASIC METABOLIC PNL TOTAL CA: CPT

## 2020-06-02 RX ORDER — DEXLANSOPRAZOLE 30 MG/1
30 CAPSULE, DELAYED RELEASE ORAL DAILY
COMMUNITY
Start: 2020-05-06

## 2020-06-02 NOTE — PROGRESS NOTES
Subjective:     Patient ID:  Juan Stephens is a 34 y.o. male who presents for follow-up of Congestive Heart Failure    HPI:  35 yo WM is the son of 1 of patients who had a heart transplant and subsequently  metastatic cancer in 2019.  He reports that in December he suffered a massive myocardial infarction and angiography demonstrated totally occluded right coronary artery.  The coronary stent, Synergy 3.5 x 12 drug-eluting stent was placed.  He had no warning prior to that heart attack and onset of chest pain occurred while showering.  He did well for approximately 2 weeks and then presented back to the hospital with acute congestive heart failure.  Prior to the 2018 hospitalization he was drinking half case of beer per week and also smoking 1 per day.  He quit both of these activities since 2019.    He wanted to be seen for his heart failure came to [place] March 15, 2019 and while driving developed recurrent chest pain.  He came to the emergency room and was subsequently taken to the cardiac catheterization laboratory ulcerated 90% narrowing was noted in the proximal 1st diagonal.  He underwent placement of drug-eluting stents 2 point by 22 and 2.5 x 8 placed in that region overlapping 1 another with no residual stenosis.  There was a 40% of the 3rd marginal branch.  Right coronary stent widely patent.  His left ventricular end-diastolic pressure was normal at 7 mm Hg.  An echocardiogram demonstrated estimated ejection fraction 45% and grade 1 diastolic dysfunction.  Wall motion was described as a globally hypokinetic.    He saw me 2019 and I recommended the following:  Change to aspirin 81 mg qd--bolivar not ecotrin  Change carvedilol to metoprolol succinate 50 mg once daily  They will gradually go up at home to 200 mg a day  Other recommendations for next visit with HF clinic at home would be to do the following:   Increase entresto to  mg twice a day   Reduce bumetanide to  3 days/week   Consider replacing potassium with spironolactone 25 mg once a day    He saw Dr. Hilton May 2019 but no changes made in regimen.  I was contacted Sept 2019 re: refill and I reviewed Dr. Hilton' clinic note and VS from May 2019.  At that time I increased metoprolol succinate to 100 mg qd.  At visit Oct 24, 2019 I increased Entresto to  mg BID and 2 weeks later increased metoprolol succinate to 200 mg qd.      He comes today for f/u visit.  He denies ORTIZ except sometimes after getting out of shower and drying off.  He is walking 2-3 miles every morning estimates 15 min/mile pace but does not time his walks.  Sleeping on 1 pillow with HOB elevated for reflux, not breathing.  No PND.  Palpitations have essentially resolved on higher dose of toprol.  Since going on the max dose of Entresto has not required bumetanide.  No CP.    With COVID he has not been working as afraid of getting sick.  He notes 15# weight gain attributes to not working and overeating.  He and wife just started diet last week.    Review of Systems   Constitution: Positive for weight gain. Negative for chills, fever, malaise/fatigue, night sweats and weight loss.   Cardiovascular: Negative for chest pain, dyspnea on exertion, irregular heartbeat, leg swelling, near-syncope, orthopnea, palpitations, paroxysmal nocturnal dyspnea and syncope.   Respiratory: Positive for sleep disturbances due to breathing. Negative for cough, sputum production and wheezing.    Hematologic/Lymphatic: Does not bruise/bleed easily.   Musculoskeletal: Negative for arthritis, joint pain and stiffness.   Gastrointestinal: Negative for hematochezia and melena.   Genitourinary: Negative for hematuria.   Neurological: Negative for brief paralysis, focal weakness, seizures and weakness.     Objective:   Physical Exam   Constitutional: He appears well-developed and well-nourished. No distress.   /70 (BP Location: Right arm, Patient Position: Sitting, BP  "Method: Medium (Automatic))   Pulse 61   Ht 5' 10" (1.778 m)   Wt 102.9 kg (226 lb 13.7 oz)   BMI 32.55 kg/m²   Last visit wt 96 kg (211 lb 10.3 oz)   HENT:   Head: Normocephalic and atraumatic.   Eyes: Conjunctivae are normal. No scleral icterus.   Neck: No JVD present. No thyromegaly present.   Cardiovascular: Normal rate, regular rhythm and normal heart sounds. Exam reveals no gallop.   No murmur heard.  Pulmonary/Chest: Effort normal and breath sounds normal.   Abdominal: Soft. Bowel sounds are normal. He exhibits no distension and no mass. There is no tenderness. There is no rebound and no guarding.   Musculoskeletal: He exhibits no edema or tenderness.   Skin: Skin is warm and dry. He is not diaphoretic.      Lab Results   Component Value Date     06/02/2020    K 4.3 06/02/2020     06/02/2020    CO2 25 06/02/2020    BUN 14 06/02/2020    CREATININE 1.0 06/02/2020    CALCIUM 9.2 06/02/2020    ANIONGAP 6 (L) 06/02/2020    ESTGFRAFRICA >60.0 06/02/2020    EGFRNONAA >60.0 06/02/2020     Assessment:     1. Chronic combined systolic and diastolic congestive heart failure    2. Ischemic cardiomyopathy    3. Coronary artery disease involving native coronary artery of native heart without angina pectoris    4. Essential hypertension    5. Low serum HDL      Plan:   Will call BMP result--done 12:40 PM 6/2  If he starts needing diuretic will let me know  Work on diet, wt loss and encouraged to resume work as comfort level of returning to normal post COVID improves  Assuming all OK, same treatment and RTC 6 months with ECHO, lipids, CMP, CBC, BNP  "

## 2020-11-11 ENCOUNTER — PATIENT MESSAGE (OUTPATIENT)
Dept: TRANSPLANT | Facility: CLINIC | Age: 35
End: 2020-11-11

## 2020-11-18 ENCOUNTER — PATIENT MESSAGE (OUTPATIENT)
Dept: TRANSPLANT | Facility: CLINIC | Age: 35
End: 2020-11-18

## 2020-12-15 ENCOUNTER — OFFICE VISIT (OUTPATIENT)
Dept: TRANSPLANT | Facility: CLINIC | Age: 35
End: 2020-12-15
Attending: INTERNAL MEDICINE
Payer: MEDICAID

## 2020-12-15 ENCOUNTER — HOSPITAL ENCOUNTER (OUTPATIENT)
Dept: CARDIOLOGY | Facility: HOSPITAL | Age: 35
Discharge: HOME | End: 2020-12-15
Attending: INTERNAL MEDICINE
Payer: MEDICAID

## 2020-12-15 VITALS
BODY MASS INDEX: 32.35 KG/M2 | WEIGHT: 226 LBS | HEIGHT: 70 IN | SYSTOLIC BLOOD PRESSURE: 120 MMHG | DIASTOLIC BLOOD PRESSURE: 78 MMHG | HEART RATE: 63 BPM

## 2020-12-15 VITALS
DIASTOLIC BLOOD PRESSURE: 72 MMHG | BODY MASS INDEX: 33.93 KG/M2 | WEIGHT: 237 LBS | HEART RATE: 78 BPM | HEIGHT: 70 IN | SYSTOLIC BLOOD PRESSURE: 109 MMHG

## 2020-12-15 DIAGNOSIS — I25.5 ISCHEMIC CARDIOMYOPATHY: ICD-10-CM

## 2020-12-15 DIAGNOSIS — I25.10 CORONARY ARTERY DISEASE INVOLVING NATIVE CORONARY ARTERY OF NATIVE HEART WITHOUT ANGINA PECTORIS: ICD-10-CM

## 2020-12-15 DIAGNOSIS — I50.42 CHRONIC COMBINED SYSTOLIC AND DIASTOLIC CONGESTIVE HEART FAILURE: ICD-10-CM

## 2020-12-15 DIAGNOSIS — I10 ESSENTIAL HYPERTENSION: ICD-10-CM

## 2020-12-15 DIAGNOSIS — I25.5 ISCHEMIC CARDIOMYOPATHY: Primary | ICD-10-CM

## 2020-12-15 LAB
ASCENDING AORTA: 2.96 CM
AV INDEX (PROSTH): 0.62
AV MEAN GRADIENT: 4 MMHG
AV PEAK GRADIENT: 7 MMHG
AV VALVE AREA: 2.37 CM2
AV VELOCITY RATIO: 0.59
BSA FOR ECHO PROCEDURE: 2.25 M2
CV ECHO LV RWT: 0.24 CM
DOP CALC AO PEAK VEL: 1.28 M/S
DOP CALC AO VTI: 27.46 CM
DOP CALC LVOT AREA: 3.8 CM2
DOP CALC LVOT DIAMETER: 2.21 CM
DOP CALC LVOT PEAK VEL: 0.76 M/S
DOP CALC LVOT STROKE VOLUME: 64.99 CM3
DOP CALCLVOT PEAK VEL VTI: 16.95 CM
E WAVE DECELERATION TIME: 208.99 MSEC
E/A RATIO: 1.62
E/E' RATIO: 6.95 M/S
ECHO LV POSTERIOR WALL: 0.69 CM (ref 0.6–1.1)
FRACTIONAL SHORTENING: 19 % (ref 28–44)
INTERVENTRICULAR SEPTUM: 0.71 CM (ref 0.6–1.1)
IVRT: 125.59 MSEC
LA MAJOR: 4.95 CM
LA MINOR: 5.04 CM
LA WIDTH: 3.95 CM
LEFT ATRIUM SIZE: 4.29 CM
LEFT ATRIUM VOLUME INDEX MOD: 22 ML/M2
LEFT ATRIUM VOLUME INDEX: 32.7 ML/M2
LEFT ATRIUM VOLUME MOD: 48.29 CM3
LEFT ATRIUM VOLUME: 71.94 CM3
LEFT INTERNAL DIMENSION IN SYSTOLE: 4.69 CM (ref 2.1–4)
LEFT VENTRICLE DIASTOLIC VOLUME INDEX: 75.68 ML/M2
LEFT VENTRICLE DIASTOLIC VOLUME: 166.4 ML
LEFT VENTRICLE MASS INDEX: 68 G/M2
LEFT VENTRICLE SYSTOLIC VOLUME INDEX: 46.3 ML/M2
LEFT VENTRICLE SYSTOLIC VOLUME: 101.83 ML
LEFT VENTRICULAR INTERNAL DIMENSION IN DIASTOLE: 5.8 CM (ref 3.5–6)
LEFT VENTRICULAR MASS: 148.81 G
LV LATERAL E/E' RATIO: 5.21 M/S
LV SEPTAL E/E' RATIO: 10.43 M/S
MV A" WAVE DURATION": 11.99 MSEC
MV PEAK A VEL: 0.45 M/S
MV PEAK E VEL: 0.73 M/S
PISA TR MAX VEL: 2.3 M/S
PULM VEIN S/D RATIO: 1.22
PV PEAK D VEL: 0.41 M/S
PV PEAK S VEL: 0.5 M/S
RA MAJOR: 4.36 CM
RA PRESSURE: 3 MMHG
RA WIDTH: 4.35 CM
RETIRED EF AND QEF - SEE NOTES: 40 %
RIGHT VENTRICULAR END-DIASTOLIC DIMENSION: 3.51 CM
RV TISSUE DOPPLER FREE WALL SYSTOLIC VELOCITY 1 (APICAL 4 CHAMBER VIEW): 10.03 CM/S
SINUS: 3.21 CM
STJ: 2.85 CM
TDI LATERAL: 0.14 M/S
TDI SEPTAL: 0.07 M/S
TDI: 0.11 M/S
TR MAX PG: 21 MMHG
TRICUSPID ANNULAR PLANE SYSTOLIC EXCURSION: 1.39 CM
TV REST PULMONARY ARTERY PRESSURE: 24 MMHG

## 2020-12-15 PROCEDURE — 93306 TTE W/DOPPLER COMPLETE: CPT | Mod: 26,,, | Performed by: INTERNAL MEDICINE

## 2020-12-15 PROCEDURE — 93306 TTE W/DOPPLER COMPLETE: CPT

## 2020-12-15 PROCEDURE — 93306 ECHO (CUPID ONLY): ICD-10-PCS | Mod: 26,,, | Performed by: INTERNAL MEDICINE

## 2020-12-15 PROCEDURE — 99999 PR PBB SHADOW E&M-EST. PATIENT-LVL III: ICD-10-PCS | Mod: PBBFAC,,, | Performed by: INTERNAL MEDICINE

## 2020-12-15 PROCEDURE — 99213 OFFICE O/P EST LOW 20 MIN: CPT | Mod: PBBFAC,25 | Performed by: INTERNAL MEDICINE

## 2020-12-15 PROCEDURE — 99215 PR OFFICE/OUTPT VISIT, EST, LEVL V, 40-54 MIN: ICD-10-PCS | Mod: S$PBB,,, | Performed by: INTERNAL MEDICINE

## 2020-12-15 PROCEDURE — 99999 PR PBB SHADOW E&M-EST. PATIENT-LVL III: CPT | Mod: PBBFAC,,, | Performed by: INTERNAL MEDICINE

## 2020-12-15 PROCEDURE — 99215 OFFICE O/P EST HI 40 MIN: CPT | Mod: S$PBB,,, | Performed by: INTERNAL MEDICINE

## 2020-12-15 NOTE — PROGRESS NOTES
Subjective:     Patient ID:  Juan Stephens is a 35 y.o. male who presents for follow-up of Congestive Heart Failure    HPI:  33 yo WM is the son of 1 of patients who had a heart transplant and subsequently  metastatic cancer in 2019.  He reports that in December he suffered a massive myocardial infarction and angiography demonstrated totally occluded right coronary artery.  The coronary stent, Synergy 3.5 x 12 drug-eluting stent was placed.  He had no warning prior to that heart attack and onset of chest pain occurred while showering.  He did well for approximately 2 weeks and then presented back to the hospital with acute congestive heart failure.  Prior to the 2018 hospitalization he was drinking half case of beer per week and also smoking 1 per day.  He quit both of these activities since 2019.    He wanted to be seen for his heart failure came to [place] March 15, 2019 and while driving developed recurrent chest pain.  He came to the emergency room and was subsequently taken to the cardiac catheterization laboratory ulcerated 90% narrowing was noted in the proximal 1st diagonal.  He underwent placement of drug-eluting stents 2 point by 22 and 2.5 x 8 placed in that region overlapping 1 another with no residual stenosis.  There was a 40% of the 3rd marginal branch.  Right coronary stent widely patent.  His left ventricular end-diastolic pressure was normal at 7 mm Hg.  An echocardiogram demonstrated estimated ejection fraction 45% and grade 1 diastolic dysfunction.  Wall motion was described as a globally hypokinetic.    He saw me 2019 and I recommended the following:  Change to aspirin 81 mg qd--bolivar not ecotrin  Change carvedilol to metoprolol succinate 50 mg once daily  They will gradually go up at home to 200 mg a day  Other recommendations for next visit with HF clinic at home would be to do the following:   Increase entresto to  mg twice a day   Reduce bumetanide to  3 days/week   Consider replacing potassium with spironolactone 25 mg once a day    He saw Dr. Hilton May 2019 but no changes made in regimen.  I was contacted Sept 2019 re: refill and I reviewed Dr. Hilton' clinic note and VS from May 2019.  At that time I increased metoprolol succinate to 100 mg qd.  At visit Oct 24, 2019 I increased Entresto to  mg BID and 2 weeks later increased metoprolol succinate to 200 mg qd.      He comes today for f/u visit.  He denies ORTIZ except sometimes after getting out of shower and drying off.  He is walking 2-3 miles every morning estimates 15 min/mile pace but does not time his walks.  Sleeping on 1 pillow with HOB elevated for reflux, not breathing.  No PND.  Palpitations have essentially resolved on higher dose of toprol.  Since going on the max dose of Entresto has not required bumetanide.  No CP.    With COVID he has not been working as afraid of getting sick.  He notes 15# weight gain attributes to not working and overeating.  He and wife just started diet last week.      Slightly tired but no shortness of breath    · The left ventricle is mildly enlarged with mildly decreased systolic function. The estimated ejection fraction is 40%.  · Normal right ventricular size with low normal right ventricular systolic function.  · Grade I left ventricular diastolic dysfunction.  · The estimated PA systolic pressure is 24 mmHg.  · Normal central venous pressure (3 mmHg    Review of Systems   Constitution: Positive for weight gain. Negative for chills, fever, malaise/fatigue, night sweats and weight loss.   Cardiovascular: Negative for chest pain, dyspnea on exertion, irregular heartbeat, leg swelling, near-syncope, orthopnea, palpitations, paroxysmal nocturnal dyspnea and syncope.   Respiratory: Positive for sleep disturbances due to breathing. Negative for cough, sputum production and wheezing.    Hematologic/Lymphatic: Does not bruise/bleed easily.   Musculoskeletal: Negative  for arthritis, joint pain and stiffness.   Gastrointestinal: Negative for hematochezia and melena.   Genitourinary: Negative for hematuria.   Neurological: Negative for brief paralysis, focal weakness, seizures and weakness.     Objective:   Physical Exam   Constitutional: He appears well-developed and well-nourished. No distress.   HENT:   Head: Normocephalic and atraumatic.   Eyes: Conjunctivae are normal. No scleral icterus.   Neck: No JVD present. No thyromegaly present.   Cardiovascular: Normal rate, regular rhythm and normal heart sounds. Exam reveals no gallop.   No murmur heard.  Pulmonary/Chest: Effort normal and breath sounds normal.   Abdominal: Soft. Bowel sounds are normal. He exhibits no distension and no mass. There is no abdominal tenderness. There is no rebound and no guarding.   Musculoskeletal:         General: No tenderness or edema.   Skin: Skin is warm and dry. He is not diaphoretic.      Lab Results   Component Value Date     12/15/2020    K 4.2 12/15/2020     12/15/2020    CO2 28 12/15/2020    BUN 12 12/15/2020    CREATININE 1.0 12/15/2020    CALCIUM 9.2 12/15/2020    ANIONGAP 6 (L) 12/15/2020    ESTGFRAFRICA >60.0 12/15/2020    EGFRNONAA >60.0 12/15/2020     Assessment:     1. Ischemic cardiomyopathy    2. Essential hypertension    3. Coronary artery disease involving native coronary artery of native heart without angina pectoris    4. Chronic combined systolic and diastolic congestive heart failure      Plan:       HFrEF reverse remodeling    RTC 1 year with CPX

## 2021-05-12 ENCOUNTER — PATIENT MESSAGE (OUTPATIENT)
Dept: RESEARCH | Facility: HOSPITAL | Age: 36
End: 2021-05-12

## 2021-08-07 NOTE — Clinical Note
Catheter  proximal first diagonal artery. The vessel(s) were injected and visualized in multiple views.  no Statement Selected

## 2021-08-11 ENCOUNTER — PATIENT MESSAGE (OUTPATIENT)
Dept: TRANSPLANT | Facility: CLINIC | Age: 36
End: 2021-08-11

## 2021-08-31 ENCOUNTER — PATIENT MESSAGE (OUTPATIENT)
Dept: TRANSPLANT | Facility: CLINIC | Age: 36
End: 2021-08-31

## 2021-10-25 ENCOUNTER — PATIENT MESSAGE (OUTPATIENT)
Dept: TRANSPLANT | Facility: CLINIC | Age: 36
End: 2021-10-25
Payer: MEDICAID

## 2022-01-06 ENCOUNTER — PATIENT MESSAGE (OUTPATIENT)
Dept: TRANSPLANT | Facility: CLINIC | Age: 37
End: 2022-01-06
Payer: MEDICAID

## 2022-01-06 DIAGNOSIS — I50.22 CHRONIC SYSTOLIC CONGESTIVE HEART FAILURE: Primary | ICD-10-CM

## 2022-01-07 ENCOUNTER — OFFICE VISIT (OUTPATIENT)
Dept: TRANSPLANT | Facility: CLINIC | Age: 37
End: 2022-01-07
Attending: INTERNAL MEDICINE
Payer: MEDICAID

## 2022-01-07 ENCOUNTER — LAB VISIT (OUTPATIENT)
Dept: LAB | Facility: HOSPITAL | Age: 37
End: 2022-01-07
Attending: INTERNAL MEDICINE
Payer: MEDICAID

## 2022-01-07 VITALS
BODY MASS INDEX: 33.52 KG/M2 | DIASTOLIC BLOOD PRESSURE: 72 MMHG | HEART RATE: 79 BPM | SYSTOLIC BLOOD PRESSURE: 112 MMHG | WEIGHT: 234.13 LBS | HEIGHT: 70 IN

## 2022-01-07 DIAGNOSIS — I25.10 CORONARY ARTERY DISEASE INVOLVING NATIVE CORONARY ARTERY OF NATIVE HEART WITHOUT ANGINA PECTORIS: ICD-10-CM

## 2022-01-07 DIAGNOSIS — I50.22 CHRONIC SYSTOLIC CONGESTIVE HEART FAILURE: ICD-10-CM

## 2022-01-07 DIAGNOSIS — I25.5 ISCHEMIC CARDIOMYOPATHY: ICD-10-CM

## 2022-01-07 DIAGNOSIS — I50.42 CHRONIC COMBINED SYSTOLIC AND DIASTOLIC CONGESTIVE HEART FAILURE: Primary | ICD-10-CM

## 2022-01-07 DIAGNOSIS — I10 ESSENTIAL HYPERTENSION: ICD-10-CM

## 2022-01-07 DIAGNOSIS — R53.82 CHRONIC FATIGUE: ICD-10-CM

## 2022-01-07 DIAGNOSIS — R74.8 LOW SERUM HDL: ICD-10-CM

## 2022-01-07 DIAGNOSIS — K21.9 GASTROESOPHAGEAL REFLUX DISEASE WITHOUT ESOPHAGITIS: ICD-10-CM

## 2022-01-07 DIAGNOSIS — G47.9 SLEEP DISTURBANCE: ICD-10-CM

## 2022-01-07 LAB
ANION GAP SERPL CALC-SCNC: 9 MMOL/L (ref 8–16)
BNP SERPL-MCNC: 46 PG/ML (ref 0–99)
BUN SERPL-MCNC: 12 MG/DL (ref 6–20)
CALCIUM SERPL-MCNC: 9.5 MG/DL (ref 8.7–10.5)
CHLORIDE SERPL-SCNC: 103 MMOL/L (ref 95–110)
CO2 SERPL-SCNC: 27 MMOL/L (ref 23–29)
CREAT SERPL-MCNC: 0.9 MG/DL (ref 0.5–1.4)
EST. GFR  (AFRICAN AMERICAN): >60 ML/MIN/1.73 M^2
EST. GFR  (NON AFRICAN AMERICAN): >60 ML/MIN/1.73 M^2
GLUCOSE SERPL-MCNC: 96 MG/DL (ref 70–110)
POTASSIUM SERPL-SCNC: 4.2 MMOL/L (ref 3.5–5.1)
SODIUM SERPL-SCNC: 139 MMOL/L (ref 136–145)

## 2022-01-07 PROCEDURE — 4010F PR ACE/ARB THEARPY RXD/TAKEN: ICD-10-PCS | Mod: CPTII,,, | Performed by: INTERNAL MEDICINE

## 2022-01-07 PROCEDURE — 83880 ASSAY OF NATRIURETIC PEPTIDE: CPT | Performed by: INTERNAL MEDICINE

## 2022-01-07 PROCEDURE — 4010F ACE/ARB THERAPY RXD/TAKEN: CPT | Mod: CPTII,,, | Performed by: INTERNAL MEDICINE

## 2022-01-07 PROCEDURE — 36415 COLL VENOUS BLD VENIPUNCTURE: CPT | Performed by: INTERNAL MEDICINE

## 2022-01-07 PROCEDURE — 3008F PR BODY MASS INDEX (BMI) DOCUMENTED: ICD-10-PCS | Mod: CPTII,,, | Performed by: INTERNAL MEDICINE

## 2022-01-07 PROCEDURE — 99999 PR PBB SHADOW E&M-EST. PATIENT-LVL IV: CPT | Mod: PBBFAC,,, | Performed by: INTERNAL MEDICINE

## 2022-01-07 PROCEDURE — 3074F SYST BP LT 130 MM HG: CPT | Mod: CPTII,,, | Performed by: INTERNAL MEDICINE

## 2022-01-07 PROCEDURE — 99214 OFFICE O/P EST MOD 30 MIN: CPT | Mod: PBBFAC | Performed by: INTERNAL MEDICINE

## 2022-01-07 PROCEDURE — 1159F MED LIST DOCD IN RCRD: CPT | Mod: CPTII,,, | Performed by: INTERNAL MEDICINE

## 2022-01-07 PROCEDURE — 1160F PR REVIEW ALL MEDS BY PRESCRIBER/CLIN PHARMACIST DOCUMENTED: ICD-10-PCS | Mod: CPTII,,, | Performed by: INTERNAL MEDICINE

## 2022-01-07 PROCEDURE — 3078F DIAST BP <80 MM HG: CPT | Mod: CPTII,,, | Performed by: INTERNAL MEDICINE

## 2022-01-07 PROCEDURE — 3008F BODY MASS INDEX DOCD: CPT | Mod: CPTII,,, | Performed by: INTERNAL MEDICINE

## 2022-01-07 PROCEDURE — 99214 OFFICE O/P EST MOD 30 MIN: CPT | Mod: S$PBB,,, | Performed by: INTERNAL MEDICINE

## 2022-01-07 PROCEDURE — 99214 PR OFFICE/OUTPT VISIT, EST, LEVL IV, 30-39 MIN: ICD-10-PCS | Mod: S$PBB,,, | Performed by: INTERNAL MEDICINE

## 2022-01-07 PROCEDURE — 99999 PR PBB SHADOW E&M-EST. PATIENT-LVL IV: ICD-10-PCS | Mod: PBBFAC,,, | Performed by: INTERNAL MEDICINE

## 2022-01-07 PROCEDURE — 3078F PR MOST RECENT DIASTOLIC BLOOD PRESSURE < 80 MM HG: ICD-10-PCS | Mod: CPTII,,, | Performed by: INTERNAL MEDICINE

## 2022-01-07 PROCEDURE — 80048 BASIC METABOLIC PNL TOTAL CA: CPT | Performed by: INTERNAL MEDICINE

## 2022-01-07 PROCEDURE — 3074F PR MOST RECENT SYSTOLIC BLOOD PRESSURE < 130 MM HG: ICD-10-PCS | Mod: CPTII,,, | Performed by: INTERNAL MEDICINE

## 2022-01-07 PROCEDURE — 1159F PR MEDICATION LIST DOCUMENTED IN MEDICAL RECORD: ICD-10-PCS | Mod: CPTII,,, | Performed by: INTERNAL MEDICINE

## 2022-01-07 PROCEDURE — 1160F RVW MEDS BY RX/DR IN RCRD: CPT | Mod: CPTII,,, | Performed by: INTERNAL MEDICINE

## 2022-01-07 NOTE — PROGRESS NOTES
Subjective:     Patient ID:  Juan Stephens is a 36 y.o. male who presents for follow-up of Congestive Heart Failure  Last seen 12/15/2020  HPI:  35 yo WM is the son of one of our patients who had a heart transplant and subsequently  of metastatic cancer in 2019.  He reports that in 2018 he suffered a massive myocardial infarction and angiography demonstrated totally occluded right coronary artery.  The coronary stent, Synergy 3.5 x 12 drug-eluting stent was placed.  He had no warning prior to that heart attack and onset of chest pain occurred while showering.  He did well for approximately 2 weeks and then presented back to the hospital with acute congestive heart failure.  Prior to the 2018 hospitalization he was drinking half case of beer per week and also smoking 1 per day.  He quit both of these activities 2019.    He wanted to be seen for his heart failure came to [place] March 15, 2019 and while driving developed recurrent chest pain.  He went to the [facility] emergency room and was subsequently taken to the cardiac catheterization laboratory.  An ulcerated 90% narrowing was noted in the proximal 1st diagonal.  He underwent placement of drug-eluting stents 2.5x 22 and 2.5 x 8 placed in that region overlapping one another with no residual stenosis.  There was a 40% of the 3rd marginal branch.  Right coronary stent previously placed in 2018 at an outside hospital was widely patent.  His left ventricular end-diastolic pressure was normal at 7 mm Hg.  An echocardiogram demonstrated estimated ejection fraction 45% and grade 1 diastolic dysfunction.  Wall motion was described as a globally hypokinetic.    He saw me 2019 and I recommended the following:  Change to aspirin 81 mg qd--bolivar not ecotrin  Change carvedilol to metoprolol succinate 50 mg once daily  They will gradually go up at home to 200 mg a day  Other recommendations for next visit with HF clinic at  home would be to do the following:   Increase entresto to  mg twice a day   Reduce bumetanide to 3 days/week   Consider replacing potassium with spironolactone 25 mg once a day    He saw Dr. Hilton May 2019 but no changes made in regimen.  I was contacted Sept 2019 re: refill.  After reviewing the clinic note and VS from May 2019, I increased metoprolol succinate to 100 mg qd.  At visit 1 month later on Oct 24, 2019 I increased Entresto to  mg BID and 2 weeks later increased metoprolol succinate to 200 mg qd.  At visits in June and Dec 2020 he was doing well no changes made.    Today he reports that he has been doing well without angina.  No dyspnea on exertion orthopnea or PND.  He is having fatigue and snores loudly with disturbed sleep patterns.  He also experiences daytime sleepiness falling in meetings and driving.    Review of Systems   Constitutional: Positive for malaise/fatigue and weight gain. Negative for chills, fever, night sweats and weight loss.   Cardiovascular: Negative for chest pain, dyspnea on exertion, irregular heartbeat, leg swelling, near-syncope, orthopnea, palpitations, paroxysmal nocturnal dyspnea and syncope.   Respiratory: Positive for sleep disturbances due to breathing (Bad dreams also interfere with sleep). Negative for cough, sputum production and wheezing.    Hematologic/Lymphatic: Does not bruise/bleed easily.   Musculoskeletal: Negative for arthritis, joint pain and stiffness.   Gastrointestinal: Negative for hematochezia and melena.        Still suffers with GE reflux   Genitourinary: Negative for hematuria.   Neurological: Negative for brief paralysis, focal weakness, seizures and weakness.     Objective:   Physical Exam  Constitutional:       General: He is not in acute distress.     Appearance: He is well-developed. He is not diaphoretic.      Comments: /72 (BP Location: Left arm, Patient Position: Sitting, BP Method: Large (Automatic))   Pulse 79   Ht 5'  "10" (1.778 m)   Wt 106.2 kg (234 lb 2.1 oz)   BMI 33.59 kg/m²      HENT:      Head: Normocephalic and atraumatic.   Eyes:      General: No scleral icterus.     Conjunctiva/sclera: Conjunctivae normal.   Neck:      Thyroid: No thyromegaly.      Vascular: No JVD.   Cardiovascular:      Rate and Rhythm: Normal rate and regular rhythm.      Heart sounds: Normal heart sounds. No murmur heard.  No gallop.    Pulmonary:      Effort: Pulmonary effort is normal.      Breath sounds: Normal breath sounds.   Abdominal:      General: Bowel sounds are normal. There is no distension.      Palpations: Abdomen is soft. There is no mass.      Tenderness: There is no abdominal tenderness. There is no guarding or rebound.   Musculoskeletal:         General: No tenderness.   Skin:     General: Skin is warm and dry.        Lab Results   Component Value Date    BNP 46 01/07/2022    BNP 36 12/15/2020    BNP 46 10/24/2019     Lab Results   Component Value Date     01/07/2022    K 4.2 01/07/2022     01/07/2022    CO2 27 01/07/2022    BUN 12 01/07/2022    CREATININE 0.9 01/07/2022    CALCIUM 9.5 01/07/2022    ANIONGAP 9 01/07/2022    ESTGFRAFRICA >60.0 01/07/2022    EGFRNONAA >60.0 01/07/2022     12/15/2020 echocardiogram Summary  · The left ventricle is mildly enlarged with mildly decreased systolic function. The estimated ejection fraction is 40%.  · Normal right ventricular size with low normal right ventricular systolic function.  · Grade I left ventricular diastolic dysfunction.  · The estimated PA systolic pressure is 24 mmHg.  · Normal central venous pressure (3 mmHg).    His last lipids and complete metabolic profile were performed 12/15/2020 total cholesterol 116, HDL 29, LDL 41, triglyceride 231, LFTs normal, glucose normal, electrolytes and renal function normal, BNP 36  Assessment:     1. Chronic combined systolic and diastolic congestive heart failure    2. Sleep disturbance    3. Chronic fatigue    4. Ischemic " cardiomyopathy    5. Coronary artery disease involving native coronary artery of native heart without angina pectoris    6. Essential hypertension    7. Low serum HDL    8. Gastroesophageal reflux disease without esophagitis      Plan:   He needs a sleep consult for evaluation of likely sleep apnea    He needs to work on weight loss as he continues to gain weight over the past 2-3 years    He is still on dual anti-platelet therapy with Effient plus aspirin.  I spoke with Dr. Woody and confirmed that it is acceptable for him to stop the Effient at this point and have done so.    Since he continues to do well he does not have to return for the CPX planned at the time of his last visit with Dr. Jonas.      I would like for him to get a lipid and CMP in approximately 6 months with echo and visit.

## 2022-03-15 ENCOUNTER — TELEPHONE (OUTPATIENT)
Dept: PULMONOLOGY | Facility: CLINIC | Age: 37
End: 2022-03-15
Payer: MEDICAID

## 2022-03-15 NOTE — TELEPHONE ENCOUNTER
----- Message from Genesis Andrade sent at 3/15/2022  9:43 AM CDT -----  Regarding: referral  Contact: wife  Calling for an appt from referral from [facility]. Alexandria Stephens at [phone]

## 2022-03-30 ENCOUNTER — TELEPHONE (OUTPATIENT)
Dept: PULMONOLOGY | Facility: CLINIC | Age: 37
End: 2022-03-30
Payer: MEDICAID

## 2022-03-30 DIAGNOSIS — G47.30 SLEEP-DISORDERED BREATHING: Primary | ICD-10-CM

## 2022-04-05 ENCOUNTER — HOSPITAL ENCOUNTER (OUTPATIENT)
Dept: RADIOLOGY | Facility: HOSPITAL | Age: 37
Discharge: HOME | End: 2022-04-05
Attending: INTERNAL MEDICINE
Payer: MEDICAID

## 2022-04-05 ENCOUNTER — OFFICE VISIT (OUTPATIENT)
Dept: PULMONOLOGY | Facility: CLINIC | Age: 37
End: 2022-04-05
Payer: MEDICAID

## 2022-04-05 VITALS
HEART RATE: 75 BPM | SYSTOLIC BLOOD PRESSURE: 130 MMHG | DIASTOLIC BLOOD PRESSURE: 84 MMHG | OXYGEN SATURATION: 98 % | WEIGHT: 220.13 LBS | BODY MASS INDEX: 31.51 KG/M2 | HEIGHT: 70 IN | RESPIRATION RATE: 18 BRPM

## 2022-04-05 DIAGNOSIS — I10 ESSENTIAL HYPERTENSION: ICD-10-CM

## 2022-04-05 DIAGNOSIS — G47.59 OTHER PARASOMNIA: ICD-10-CM

## 2022-04-05 DIAGNOSIS — G47.9 SLEEP DISTURBANCE: ICD-10-CM

## 2022-04-05 DIAGNOSIS — R53.82 CHRONIC FATIGUE: ICD-10-CM

## 2022-04-05 DIAGNOSIS — G47.30 SLEEP-DISORDERED BREATHING: ICD-10-CM

## 2022-04-05 DIAGNOSIS — I25.5 ISCHEMIC CARDIOMYOPATHY: ICD-10-CM

## 2022-04-05 DIAGNOSIS — G47.33 OSA (OBSTRUCTIVE SLEEP APNEA): Primary | ICD-10-CM

## 2022-04-05 DIAGNOSIS — I50.42 CHRONIC COMBINED SYSTOLIC AND DIASTOLIC CONGESTIVE HEART FAILURE: ICD-10-CM

## 2022-04-05 PROBLEM — G47.50 PARASOMNIA: Status: ACTIVE | Noted: 2022-04-05

## 2022-04-05 PROCEDURE — 3079F PR MOST RECENT DIASTOLIC BLOOD PRESSURE 80-89 MM HG: ICD-10-PCS | Mod: CPTII,,, | Performed by: INTERNAL MEDICINE

## 2022-04-05 PROCEDURE — 3008F BODY MASS INDEX DOCD: CPT | Mod: CPTII,,, | Performed by: INTERNAL MEDICINE

## 2022-04-05 PROCEDURE — 1159F PR MEDICATION LIST DOCUMENTED IN MEDICAL RECORD: ICD-10-PCS | Mod: CPTII,,, | Performed by: INTERNAL MEDICINE

## 2022-04-05 PROCEDURE — 99205 PR OFFICE/OUTPT VISIT, NEW, LEVL V, 60-74 MIN: ICD-10-PCS | Mod: S$PBB,,, | Performed by: INTERNAL MEDICINE

## 2022-04-05 PROCEDURE — 3075F SYST BP GE 130 - 139MM HG: CPT | Mod: CPTII,,, | Performed by: INTERNAL MEDICINE

## 2022-04-05 PROCEDURE — 99999 PR PBB SHADOW E&M-EST. PATIENT-LVL V: ICD-10-PCS | Mod: PBBFAC,,, | Performed by: INTERNAL MEDICINE

## 2022-04-05 PROCEDURE — 99215 OFFICE O/P EST HI 40 MIN: CPT | Mod: PBBFAC,25 | Performed by: INTERNAL MEDICINE

## 2022-04-05 PROCEDURE — 1159F MED LIST DOCD IN RCRD: CPT | Mod: CPTII,,, | Performed by: INTERNAL MEDICINE

## 2022-04-05 PROCEDURE — 3075F PR MOST RECENT SYSTOLIC BLOOD PRESS GE 130-139MM HG: ICD-10-PCS | Mod: CPTII,,, | Performed by: INTERNAL MEDICINE

## 2022-04-05 PROCEDURE — 71046 X-RAY EXAM CHEST 2 VIEWS: CPT | Mod: 26,,, | Performed by: RADIOLOGY

## 2022-04-05 PROCEDURE — 99205 OFFICE O/P NEW HI 60 MIN: CPT | Mod: S$PBB,,, | Performed by: INTERNAL MEDICINE

## 2022-04-05 PROCEDURE — 4010F ACE/ARB THERAPY RXD/TAKEN: CPT | Mod: CPTII,,, | Performed by: INTERNAL MEDICINE

## 2022-04-05 PROCEDURE — 3008F PR BODY MASS INDEX (BMI) DOCUMENTED: ICD-10-PCS | Mod: CPTII,,, | Performed by: INTERNAL MEDICINE

## 2022-04-05 PROCEDURE — 1160F PR REVIEW ALL MEDS BY PRESCRIBER/CLIN PHARMACIST DOCUMENTED: ICD-10-PCS | Mod: CPTII,,, | Performed by: INTERNAL MEDICINE

## 2022-04-05 PROCEDURE — 99999 PR PBB SHADOW E&M-EST. PATIENT-LVL V: CPT | Mod: PBBFAC,,, | Performed by: INTERNAL MEDICINE

## 2022-04-05 PROCEDURE — 71046 XR CHEST PA AND LATERAL: ICD-10-PCS | Mod: 26,,, | Performed by: RADIOLOGY

## 2022-04-05 PROCEDURE — 1160F RVW MEDS BY RX/DR IN RCRD: CPT | Mod: CPTII,,, | Performed by: INTERNAL MEDICINE

## 2022-04-05 PROCEDURE — 3079F DIAST BP 80-89 MM HG: CPT | Mod: CPTII,,, | Performed by: INTERNAL MEDICINE

## 2022-04-05 PROCEDURE — 4010F PR ACE/ARB THEARPY RXD/TAKEN: ICD-10-PCS | Mod: CPTII,,, | Performed by: INTERNAL MEDICINE

## 2022-04-05 PROCEDURE — 71046 X-RAY EXAM CHEST 2 VIEWS: CPT | Mod: TC

## 2022-04-05 RX ORDER — TALC
6 POWDER (GRAM) TOPICAL NIGHTLY PRN
Qty: 30 TABLET | Refills: 11 | Status: SHIPPED | OUTPATIENT
Start: 2022-04-05 | End: 2022-05-05

## 2022-04-05 RX ORDER — BUMETANIDE 1 MG/1
TABLET ORAL
COMMUNITY
Start: 2022-04-05

## 2022-04-05 NOTE — PROGRESS NOTES
Pulmonary Outpatient  Visit     Subjective:       Patient ID: Juan Stephens is a 36 y.o. male.    Chief Complaint: Sleep Apnea    The following portions of the patient's history were reviewed and updated as appropriate:   He  has a past medical history of CAD (coronary artery disease) (dEC 2019), CHF (congestive heart failure), GERD (gastroesophageal reflux disease), Hypertension, Inferior MI (12/2018), Ischemic cardiomyopathy (11/4/2019), and Low serum HDL (3/20/2019).  He does not have any pertinent problems on file.  He  has a past surgical history that includes *pr left heart cath,percutaneous (12/31/2018); Coronary angioplasty with stent; Left heart catheterization (Left, 3/18/2019); and Hernia repair (Left).  His family history includes Cancer in his father; Heart disease in his father; Heart failure in his father; Hypertension in his brother; No Known Problems in his mother.  He  reports that he quit smoking about 3 years ago. His smoking use included cigarettes. He has a 15.00 pack-year smoking history. His smokeless tobacco use includes snuff. He reports current alcohol use. He reports that he does not use drugs.  He has a current medication list which includes the following prescription(s): aspirin, atorvastatin, bumetanide, cetirizine, dexilant, entresto, metoprolol succinate, and nitroglycerin.  Current Outpatient Medications on File Prior to Visit   Medication Sig Dispense Refill    aspirin (ECOTRIN) 81 MG EC tablet Take 1 tablet (81 mg total) by mouth once daily. 30 tablet 11    atorvastatin (LIPITOR) 40 MG tablet TAKE ONE TABLET BY MOUTH EVERY EVENING FOR cholesterol 30 tablet 5    bumetanide (BUMEX) 1 MG tablet       cetirizine (ZYRTEC) 10 MG tablet Take 10 mg by mouth once daily.      DEXILANT 30 mg CpDM Take 30 mg by mouth once daily.      ENTRESTO  mg per tablet TAKE 1 TABLET BY MOUTH TWICE DAILY 60 tablet 5    metoprolol succinate  "(TOPROL-XL) 200 MG 24 hr tablet TAKE 1 TABLET BY MOUTH EVERY DAY 30 tablet 5    nitroGLYCERIN (NITROSTAT) 0.4 MG SL tablet Place 1 tablet (0.4 mg total) under the tongue every 5 (five) minutes as needed for Chest pain. 25 tablet 5     No current facility-administered medications on file prior to visit.     He has No Known Allergies..      Juan Stephens is 36 y.o.  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  (>25 is overweight, >30 is obese)    Blood Pressure = Hypertension  (PreHTN 120-139/80-89, Stg1 140-159/90-99, Stg2 >160/>100)  [place] = 3 of three KALIA categories are positive (high risk is 2-3 positive categories)     [place] Sleepiness Scale TOTAL =   12  (validated sleepiness questionnaire with a higher score indicating greater sleepiness; range 0-24)  No flowsheet data found.    STOP-Bang Questionnaire (validated KALIA screening questionnaire)  Negative unless checked off.  [x] Snoring    [x]  Tired/Fatigued/Sleepy  [x] Obstruction (apneas/choking)  [x] Pressure (HTN)  [] BMI >35  [] Age >50  [] Neck >40 cm  [x] Gender male   STOP-Bang = 5* (low risk 0-2,high risk 3-8)    Neck circumference 18" [?KALIA risk if >43cm (17in) male or >41cm (15.5 in) female]    Sleep inventory  Bed time 8 pm, Wake time 6 am  SOL 5 mins  Legs feel odd and restless at night, delay sleep  No sleep attacks  No signs of narcolepsy  Vivid " "dreams  Violent movement in sleep and dreams  Grind teeth          Review of Systems   Respiratory: Positive for apnea and snoring.    Psychiatric/Behavioral: Positive for sleep disturbance.       Outpatient Encounter Medications as of 4/5/2022   Medication Sig Dispense Refill    aspirin (ECOTRIN) 81 MG EC tablet Take 1 tablet (81 mg total) by mouth once daily. 30 tablet 11    atorvastatin (LIPITOR) 40 MG tablet TAKE ONE TABLET BY MOUTH EVERY EVENING FOR cholesterol 30 tablet 5    bumetanide (BUMEX) 1 MG tablet       cetirizine (ZYRTEC) 10 MG tablet Take 10 mg by mouth once daily.      DEXILANT 30 mg CpDM Take 30 mg by mouth once daily.      ENTRESTO  mg per tablet TAKE 1 TABLET BY MOUTH TWICE DAILY 60 tablet 5    metoprolol succinate (TOPROL-XL) 200 MG 24 hr tablet TAKE 1 TABLET BY MOUTH EVERY DAY 30 tablet 5    nitroGLYCERIN (NITROSTAT) 0.4 MG SL tablet Place 1 tablet (0.4 mg total) under the tongue every 5 (five) minutes as needed for Chest pain. 25 tablet 5     No facility-administered encounter medications on file as of 4/5/2022.       Objective:     Vital Signs (Most Recent)  Vital Signs  Pulse: 75  Resp: 18  SpO2: 98 %  BP: 130/84  Height and Weight  Height: 5' 10" (177.8 cm)  Weight: 99.9 kg (220 lb 2.1 oz)  BSA (Calculated - sq m): 2.22 sq meters  BMI (Calculated): 31.6  Weight in (lb) to have BMI = 25: 173.9]  Wt Readings from Last 2 Encounters:   04/05/22 99.9 kg (220 lb 2.1 oz)   01/07/22 106.2 kg (234 lb 2.1 oz)       Physical Exam   Constitutional: He is oriented to person, place, and time. He appears well-developed and well-nourished.   HENT:   Head: Normocephalic.   Mouth/Throat: Mallampati Score: III.   Neck: No JVD present.   Cardiovascular: Normal rate and intact distal pulses.   No murmur heard.  Pulmonary/Chest: Normal expansion, effort normal and breath sounds normal.   Abdominal: Soft. Bowel sounds are normal.   Musculoskeletal:         General: No edema. Normal range of motion. "      Cervical back: Normal range of motion and neck supple.   Lymphadenopathy:     He has no axillary adenopathy.   Neurological: He is alert and oriented to person, place, and time.   Skin: Skin is warm and dry.   Psychiatric: He has a normal mood and affect.   Nursing note and vitals reviewed.      Laboratory  Lab Results   Component Value Date    WBC 4.69 12/15/2020    RBC 5.07 12/15/2020    HGB 15.1 12/15/2020    HCT 45.1 12/15/2020    MCV 89 12/15/2020    MCH 29.8 12/15/2020    MCHC 33.5 12/15/2020    RDW 12.4 12/15/2020     (L) 12/15/2020    MPV 10.7 12/15/2020    GRAN 2.6 12/15/2020    GRAN 55.3 12/15/2020    LYMPH 1.4 12/15/2020    LYMPH 29.6 12/15/2020    MONO 0.3 12/15/2020    MONO 7.0 12/15/2020    EOS 0.3 12/15/2020    BASO 0.06 12/15/2020    EOSINOPHIL 6.4 12/15/2020    BASOPHIL 1.3 12/15/2020       BMP  Lab Results   Component Value Date     01/07/2022    K 4.2 01/07/2022     01/07/2022    CO2 27 01/07/2022    BUN 12 01/07/2022    CREATININE 0.9 01/07/2022    CALCIUM 9.5 01/07/2022    ANIONGAP 9 01/07/2022    ESTGFRAFRICA >60.0 01/07/2022    EGFRNONAA >60.0 01/07/2022    AST 19 12/15/2020    ALT 28 12/15/2020    PROT 7.2 12/15/2020       Lab Results   Component Value Date    BNP 46 01/07/2022    BNP 36 12/15/2020    BNP 46 10/24/2019    BNP 46 05/08/2019    BNP 46 05/08/2019    BNP 38 03/20/2019       No results found for: TSH    No results found for: SEDRATE    No results found for: CRP  No results found for: IGE     No results found for: ASPERGILLUS  No results found for: AFUMIGATUSCL     No results found for: ACE     Diagnostic Results:  I have personally reviewed today the following studies:    X-Ray Chest PA And Lateral  Narrative: EXAMINATION:  XR CHEST PA AND LATERAL    CLINICAL HISTORY:  Sleep apnea, unspecified    TECHNIQUE:  PA and lateral views of the chest were performed.    COMPARISON:  03/15/2019    FINDINGS:  The lungs are clear and free of infiltrate.  No pleural  effusion or pneumothorax. The heart is not enlarged.  Impression: 1.  No acute cardiopulmonary process.    Electronically signed by: Luis Carrizales DO  Date:    04/05/2022  Time:    13:00        Assessment/Plan:     Problem List Items Addressed This Visit     Chronic combined systolic and diastolic congestive heart failure    Essential hypertension     Stable seen by Cardiology           Ischemic cardiomyopathy     Stable Entresto           KALIA (obstructive sleep apnea) - Primary      Other Visit Diagnoses     Sleep disturbance        Chronic fatigue            NON REM parasomnia  Start on Melatonin  Safety advice  May have RLS. Check ferrtin    Follow up in about 4 weeks (around 5/3/2022), or Sleep study.    This note was prepared using voice recognition system and is likely to have sound alike errors that may have been overlooked even after proof reading.  Please call me with any questions    Discussed diagnosis, its evaluation, treatment and usual course. All questions answered.      Get Duenas MD     Safe sleeping environment -- All patients with RBD and their bed partners should be counseled on ways to alter the sleeping environment to prevent injury. For patients with mild symptoms, this may be all that is needed.  Safety for both patients and bed partners is the paramount concern. Firearms should not be accessible, and sharp or easily breakable items (such as lamps) should be removed from the immediate sleeping area. In the event of continued vigorous behaviors, sleeping alone is advised. Many patients resort to using padded bed rails or sleeping in a sleeping bag [79].  Other novel strategies are in development. Exiting the bed while acting out a dream is a high-risk behavior that may result in traumatic injury [83]. A bed alarm that delivers a customized calming message at the onset of dream enactment can prevent a patient from exiting the bed and avert sleep-related injury [84].

## 2022-04-27 ENCOUNTER — HOSPITAL ENCOUNTER (OUTPATIENT)
Dept: SLEEP MEDICINE | Facility: HOSPITAL | Age: 37
Discharge: HOME | End: 2022-04-27
Attending: INTERNAL MEDICINE
Payer: MEDICAID

## 2022-04-27 DIAGNOSIS — G47.33 OSA (OBSTRUCTIVE SLEEP APNEA): ICD-10-CM

## 2022-04-27 DIAGNOSIS — I50.42 CHRONIC COMBINED SYSTOLIC AND DIASTOLIC CONGESTIVE HEART FAILURE: ICD-10-CM

## 2022-04-27 DIAGNOSIS — G47.50 PARASOMNIA, UNSPECIFIED TYPE: ICD-10-CM

## 2022-04-27 DIAGNOSIS — G47.61 PLMD (PERIODIC LIMB MOVEMENT DISORDER): ICD-10-CM

## 2022-04-27 DIAGNOSIS — I25.5 ISCHEMIC CARDIOMYOPATHY: ICD-10-CM

## 2022-04-27 PROCEDURE — 95810 POLYSOM 6/> YRS 4/> PARAM: CPT

## 2022-04-27 PROCEDURE — 95810 PR POLYSOMNOGRAPHY, 4 OR MORE: ICD-10-PCS | Mod: 26,,, | Performed by: INTERNAL MEDICINE

## 2022-04-27 PROCEDURE — 95810 POLYSOM 6/> YRS 4/> PARAM: CPT | Mod: 26,,, | Performed by: INTERNAL MEDICINE

## 2022-04-27 NOTE — Clinical Note
Mild Obstructive Sleep apnea(KALIA): AHI was 9.4/hr with 54 events. 10 central apneas. Awake : 08:55pm to 10:23 pm. Electrocardiographic data showed presence of frequent PVC, no PAC. Mild Oxygen Desaturation EEG did not show alpha intrusion. The patient snored with moderate snoring volume. Significant periodic leg movements(PLMs) during sleep, PLMI was 44.1/hr. However, no significant associated arousals. PLMAI was 1.0/hr No Significant Central Sleep Apnea (CSA) Reduced sleep efficiency, long primary sleep latency, normal REM sleep latency and long slow wave latency. Reduced testing in Supine position may underestimate KALIA severity Juan Stephens - 1985 Page 2 of 3 Electronically Authenticated By Get Duenas MD on 05/02/2022 05:23 PM, from [phone] Get Duenas MD Upon electronically signing this interpretation, I certify that I have conducted a review of the raw data,

## 2022-05-02 DIAGNOSIS — G47.33 OSA (OBSTRUCTIVE SLEEP APNEA): Primary | ICD-10-CM

## 2022-05-02 NOTE — PROGRESS NOTES
Mild Obstructive Sleep apnea(KALIA): AHI was 9.4/hr with 54 events. 10 central apneas.   Awake : 08:55pm to 10:23 pm.   Electrocardiographic data showed presence of frequent PVC, no PAC.   Mild Oxygen Desaturation   EEG did not show alpha intrusion.   The patient snored with moderate snoring volume.   Significant periodic leg movements(PLMs) during sleep, PLMI was 44.1/hr. However, no significant   associated arousals. PLMAI was 1.0/hr   No Significant Central Sleep Apnea (CSA)   Reduced sleep efficiency, long primary sleep latency, normal REM sleep latency and long slow wave   latency.   Reduced testing in Supine position may underestimate KALIA severity   Juna Stephens - 1985   Page   2   of   3 Electronically Authenticated By Get Duenas MD on 05/02/2022 05:23 PM, from [phone]   Get Duenas MD   Upon electronically signing this interpretation, I certify that I have conducted a review of the raw data,   the quality of the recording, and the scoring of sleep and associated events as sufficient to allow for interpretation.   NPI: [id number]   DIAGNOSIS   RECOMMENDATIONS   MISCELLANEOUS   Obstructive Sleep Apnea (G47.33)   NREM parasomnia   Circardian rhythm disorder   Insomnia related to Sleep initiation/sleep maintaince/terminal   periodic limb movement disorder.   Therapeutic CPAP titration to determine optimal pressure required to alleviate sleep disordered breathing.   Sleep hygiene should be reviewed to assess factors that may improve sleep quality.   Weight management and regular exercise should be initiated or continued.   Clinica correlation for restless legs, check ferritin.   Continue Melatonin

## 2022-05-02 NOTE — PROCEDURES
"Mild Obstructive Sleep apnea(KALIA): AHI was 9.4/hr with 54 events. 10 central apneas.   Awake : 08:55pm to 10:23 pm.   Electrocardiographic data showed presence of frequent PVC, no PAC.   Mild Oxygen Desaturation   EEG did not show alpha intrusion.   The patient snored with moderate snoring volume.   Significant periodic leg movements(PLMs) during sleep, PLMI was 44.1/hr. However, no significant   associated arousals. PLMAI was 1.0/hr   No Significant Central Sleep Apnea (CSA)   Reduced sleep efficiency, long primary sleep latency, normal REM sleep latency and long slow wave   latency.   Reduced testing in Supine position may underestimate KALIA severity   Juan Stephens - 1985   Page   2   of   3 Electronically Authenticated By Get Duenas MD on 05/02/2022 05:23 PM, from [phone]   Get Duenas MD   Upon electronically signing this interpretation, I certify that I have conducted a review of the raw data,   the quality of the recording, and the scoring of sleep and associated events as sufficient to allow for interpretation.   NPI: [id number]   DIAGNOSIS   RECOMMENDATIONS   MISCELLANEOUS   Obstructive Sleep Apnea (G47.33)   NREM parasomnia   Circardian rhythm disorder   Insomnia related to Sleep initiation/sleep maintaince/terminal   periodic limb movement disorder.   Therapeutic CPAP titration to determine optimal pressure required to alleviate sleep disordered breathing.   Sleep hygiene should be reviewed to assess factors that may improve sleep quality.   Weight management and regular exercise should be initiated or continued.   Clinica correlation for restless legs, check ferritin.   Continue Melatonin       See imported Sleep Study result in "Chart Review" under the   "Media tab".      (This Sleep Study was interpreted by a Board Certified Sleep  Specialist who conducted an epoch-by-epoch review of the entire  raw data recording.)     (The indication for this sleep study was " "reviewed and deemed  appropriate by AASM Practice Parameters or other reasons by a  Board Certified Sleep Specialist.)      The sleep study that you ordered is complete.  You have ordered sleep LAB services to perform the sleep study for@       You can look  for the report in the  Media as a procedure document type.    As the ordering provider, you are responsible for reviewing the results and implementing a treatment plan with your patient.     If you need a Sleep Medicine provider to explain the sleep study findings and arrange treatment for the patient, please refer patient for consultation to our Sleep Clinic via [facility] with Ambulatory Consult Sleep.     To do that please place an order for an  "Ambulatory Consult Sleep" - it will go to our clinic work queue for our Medical Assistant to contact the patient for an appointment.     For any questions, please contact our clinic staff at  to talk to clinical staff or pulmonary nurse navigator    Get Duenas MD     "

## 2022-05-11 ENCOUNTER — HOSPITAL ENCOUNTER (OUTPATIENT)
Dept: SLEEP MEDICINE | Facility: HOSPITAL | Age: 37
Discharge: HOME | End: 2022-05-11
Attending: INTERNAL MEDICINE
Payer: MEDICAID

## 2022-05-11 DIAGNOSIS — G47.33 OSA (OBSTRUCTIVE SLEEP APNEA): ICD-10-CM

## 2022-05-11 PROCEDURE — 95811 POLYSOM 6/>YRS CPAP 4/> PARM: CPT | Mod: 26,,, | Performed by: INTERNAL MEDICINE

## 2022-05-11 PROCEDURE — 95811 PR POLYSOMNOGRAPHY W/CPAP: ICD-10-PCS | Mod: 26,,, | Performed by: INTERNAL MEDICINE

## 2022-05-11 NOTE — Clinical Note
Sleep onset delayed by use on electronic device Electrocardiographic data showed presence of frequent PVC, no PAC. No snoring was audible during this study. No significant Oxygen Desaturation No Significant Obstructive Sleep apnea(KALIA) Optimal pressure attained.CPAP 8 cm No Significant Central Sleep Apnea (CSA) No Significant Upper Airway Resistance Syndrome(UARS). No significant periodic leg movements(PLMs) during sleep. PLMI was 2.4/hr and PLMAI was 0.2/hr. Reduced sleep efficiency, long primary sleep latency, long REM sleep latency and long slow wave latency. Supplemental oxygen was not administered during the study. Obstructive Sleep Apnea (G47.33) Juan Stephens - 1985 Page 2 of 3 Elec

## 2022-05-15 DIAGNOSIS — G47.33 OSA (OBSTRUCTIVE SLEEP APNEA): Primary | ICD-10-CM

## 2022-05-15 NOTE — PROCEDURES
"Sleep onset delayed by use on electronic device   Electrocardiographic data showed presence of frequent PVC, no PAC.   No snoring was audible during this study.   No significant Oxygen Desaturation   No Significant Obstructive Sleep apnea(KALIA) Optimal pressure attained.CPAP 8 cm   No Significant Central Sleep Apnea (CSA)   No Significant Upper Airway Resistance Syndrome(UARS).   No significant periodic leg movements(PLMs) during sleep. PLMI was 2.4/hr and PLMAI was 0.2/hr.   Reduced sleep efficiency, long primary sleep latency, long REM sleep latency and long slow wave latency.   Supplemental oxygen was not administered during the study.   Obstructive Sleep Apnea (G47.33)   Juan Stephens - 1985   Page   2   of   3 Electronically Authenticated By Get Duenas MD on 05/15/2022 12:49 PM, from [phone]   Get Duenas MD   Upon electronically signing this interpretation, I certify that I have conducted a review of the raw data,   the quality of the recording, and the scoring of sleep and associated events as sufficient to allow for interpretation.   NPI: [id number]   RECOMMENDATIONS   Trial of CPAP therapy on 8 cm H2O with a Medium size Resmed Full Face Mask AirFit F20 mask and   heated humidification     See imported Sleep Study result in "Chart Review" under the   "Media tab".      (This Sleep Study was interpreted by a Board Certified Sleep  Specialist who conducted an epoch-by-epoch review of the entire  raw data recording.)     (The indication for this sleep study was reviewed and deemed  appropriate by AASM Practice Parameters or other reasons by a  Board Certified Sleep Specialist.)      The sleep study that you ordered is complete.  You have ordered sleep LAB services to perform the sleep study for@       You can look  for the report in the  Media as a procedure document type.    As the ordering provider, you are responsible for reviewing the results and implementing a treatment " "plan with your patient.     If you need a Sleep Medicine provider to explain the sleep study findings and arrange treatment for the patient, please refer patient for consultation to our Sleep Clinic via [facility] with Ambulatory Consult Sleep.     To do that please place an order for an  "Ambulatory Consult Sleep" - it will go to our clinic work queue for our Medical Assistant to contact the patient for an appointment.     For any questions, please contact our clinic staff at  to talk to clinical staff or pulmonary nurse navigator    Get Duenas MD     "

## 2022-05-31 ENCOUNTER — OFFICE VISIT (OUTPATIENT)
Dept: PULMONOLOGY | Facility: CLINIC | Age: 37
End: 2022-05-31
Payer: MEDICAID

## 2022-05-31 ENCOUNTER — TELEPHONE (OUTPATIENT)
Dept: PULMONOLOGY | Facility: CLINIC | Age: 37
End: 2022-05-31
Payer: MEDICAID

## 2022-05-31 DIAGNOSIS — I25.5 ISCHEMIC CARDIOMYOPATHY: ICD-10-CM

## 2022-05-31 DIAGNOSIS — G47.33 OSA (OBSTRUCTIVE SLEEP APNEA): ICD-10-CM

## 2022-05-31 DIAGNOSIS — I10 ESSENTIAL HYPERTENSION: Primary | ICD-10-CM

## 2022-05-31 DIAGNOSIS — G47.59 OTHER PARASOMNIA: ICD-10-CM

## 2022-05-31 PROCEDURE — 99213 OFFICE O/P EST LOW 20 MIN: CPT | Mod: 95,,, | Performed by: INTERNAL MEDICINE

## 2022-05-31 PROCEDURE — 99213 PR OFFICE/OUTPT VISIT, EST, LEVL III, 20-29 MIN: ICD-10-PCS | Mod: 95,,, | Performed by: INTERNAL MEDICINE

## 2022-05-31 PROCEDURE — 4010F ACE/ARB THERAPY RXD/TAKEN: CPT | Mod: CPTII,95,, | Performed by: INTERNAL MEDICINE

## 2022-05-31 PROCEDURE — 4010F PR ACE/ARB THEARPY RXD/TAKEN: ICD-10-PCS | Mod: CPTII,95,, | Performed by: INTERNAL MEDICINE

## 2022-05-31 NOTE — PROGRESS NOTES
The patient location is: HOME  The chief complaint leading to consultation is: Sleep  results    Visit type: audiovisual    Face to Face time with patient: 9 mins  15 minutes of total time spent on the encounter, which includes face to face time and non-face to face time preparing to see the patient (eg, review of tests), Obtaining and/or reviewing separately obtained history, Documenting clinical information in the electronic or other health record, Independently interpreting results (not separately reported) and communicating results to the patient/family/caregiver, or Care coordination (not separately reported).         Each patient to whom he or she provides medical services by telemedicine is:  (1) informed of the relationship between the physician and patient and the respective role of any other health care provider with respect to management of the patient; and (2) notified that he or she may decline to receive medical services by telemedicine and may withdraw from such care at any time.    Notes:                                                Pulmonary Outpatient  Visit     Subjective:       Patient ID: Juan Stephens is a 36 y.o. male.    Chief Complaint: Sleep Apnea    The following portions of the patient's history were reviewed and updated as appropriate:   He  has a past medical history of CAD (coronary artery disease) (dEC 2019), CHF (congestive heart failure), GERD (gastroesophageal reflux disease), Hypertension, Inferior MI (12/2018), Ischemic cardiomyopathy (11/4/2019), and Low serum HDL (3/20/2019).  He does not have any pertinent problems on file.  He  has a past surgical history that includes *pr left heart cath,percutaneous (12/31/2018); Coronary angioplasty with stent; Left heart catheterization (Left, 3/18/2019); and Hernia repair (Left).  His family history includes Cancer in his father; Heart disease in his father; Heart failure in his father; Hypertension in his brother; No Known  "Problems in his mother.  He  reports that he quit smoking about 3 years ago. His smoking use included cigarettes. He has a 15.00 pack-year smoking history. His smokeless tobacco use includes snuff. He reports current alcohol use. He reports that he does not use drugs.  He has a current medication list which includes the following prescription(s): aspirin, atorvastatin, bumetanide, cetirizine, dexilant, entresto, metoprolol succinate, and nitroglycerin.  Current Outpatient Medications on File Prior to Visit   Medication Sig Dispense Refill    aspirin (ECOTRIN) 81 MG EC tablet Take 1 tablet (81 mg total) by mouth once daily. 30 tablet 11    atorvastatin (LIPITOR) 40 MG tablet TAKE ONE TABLET BY MOUTH EVERY EVENING FOR cholesterol 30 tablet 5    bumetanide (BUMEX) 1 MG tablet       cetirizine (ZYRTEC) 10 MG tablet Take 10 mg by mouth once daily.      DEXILANT 30 mg CpDM Take 30 mg by mouth once daily.      ENTRESTO  mg per tablet TAKE 1 TABLET BY MOUTH TWICE DAILY 60 tablet 5    metoprolol succinate (TOPROL-XL) 200 MG 24 hr tablet TAKE 1 TABLET BY MOUTH EVERY DAY 30 tablet 5    nitroGLYCERIN (NITROSTAT) 0.4 MG SL tablet Place 1 tablet (0.4 mg total) under the tongue every 5 (five) minutes as needed for Chest pain. 25 tablet 5     No current facility-administered medications on file prior to visit.     He has No Known Allergies..      Juan Stephens is 36 y.o.  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  (>25 is overweight, >30 is obese)    Blood Pressure = Hypertension  (PreHTN 120-139/80-89, Stg1 140-159/90-99, Stg2 >160/>100)  [place] = 3 of three KALIA categories are positive (high risk is 2-3 positive categories)     Spearman Sleepiness Scale TOTAL =   12  (validated sleepiness questionnaire with a higher score indicating greater sleepiness; range 0-24)  No flowsheet data found.    STOP-Bang Questionnaire (validated KALIA screening questionnaire)  Negative unless checked off.  [x] Snoring    [x]  Tired/Fatigued/Sleepy  [x] Obstruction (apneas/choking)  [x] Pressure (HTN)  [] BMI >35  [] Age >50  [] Neck >40 cm  [x] Gender male   STOP-Bang = 5* (low risk 0-2,high risk 3-8)    Neck circumference 18" [?KALIA risk if >43cm (17in) male or >41cm (15.5 in) female]    Sleep inventory  Bed time 8 pm, Wake time 6 am  SOL 5 mins  Legs feel odd and restless at night, delay sleep  No sleep attacks  No signs of narcolepsy  Vivid dreams  Violent movement in sleep and dreams  Grind teeth          Review of Systems   Respiratory: Positive for apnea and snoring.    Psychiatric/Behavioral: Positive for sleep disturbance.       Outpatient Encounter Medications as of 5/31/2022   Medication Sig Dispense Refill    aspirin (ECOTRIN) 81 MG EC tablet Take 1 tablet (81 mg total) by mouth once daily. 30 tablet 11    atorvastatin (LIPITOR) 40 MG tablet TAKE ONE TABLET BY MOUTH EVERY EVENING FOR cholesterol 30 tablet 5    bumetanide (BUMEX) 1 MG tablet       cetirizine (ZYRTEC) 10 MG tablet Take 10 mg by mouth once daily.      DEXILANT 30 mg CpDM Take 30 mg by mouth once daily.      ENTRESTO  mg per tablet TAKE 1 TABLET BY MOUTH TWICE DAILY " 60 tablet 5    metoprolol succinate (TOPROL-XL) 200 MG 24 hr tablet TAKE 1 TABLET BY MOUTH EVERY DAY 30 tablet 5    nitroGLYCERIN (NITROSTAT) 0.4 MG SL tablet Place 1 tablet (0.4 mg total) under the tongue every 5 (five) minutes as needed for Chest pain. 25 tablet 5     No facility-administered encounter medications on file as of 5/31/2022.       Objective:     Vital Signs (Most Recent)   ]  Wt Readings from Last 2 Encounters:   04/05/22 99.9 kg (220 lb 2.1 oz)   01/07/22 106.2 kg (234 lb 2.1 oz)       Physical Exam   Constitutional: He is oriented to person, place, and time. He appears well-developed and well-nourished.   HENT:   Head: Normocephalic.   Mouth/Throat: Mallampati Score: III.   Neck: No JVD present.   Cardiovascular: Normal rate and intact distal pulses.   No murmur heard.  Pulmonary/Chest: Normal expansion, effort normal and breath sounds normal.   Abdominal: Soft. Bowel sounds are normal.   Musculoskeletal:         General: No edema. Normal range of motion.      Cervical back: Normal range of motion and neck supple.   Lymphadenopathy:     He has no axillary adenopathy.   Neurological: He is alert and oriented to person, place, and time.   Skin: Skin is warm and dry.   Psychiatric: He has a normal mood and affect.   Nursing note and vitals reviewed.      Laboratory  Lab Results   Component Value Date    WBC 4.69 12/15/2020    RBC 5.07 12/15/2020    HGB 15.1 12/15/2020    HCT 45.1 12/15/2020    MCV 89 12/15/2020    MCH 29.8 12/15/2020    MCHC 33.5 12/15/2020    RDW 12.4 12/15/2020     (L) 12/15/2020    MPV 10.7 12/15/2020    GRAN 2.6 12/15/2020    GRAN 55.3 12/15/2020    LYMPH 1.4 12/15/2020    LYMPH 29.6 12/15/2020    MONO 0.3 12/15/2020    MONO 7.0 12/15/2020    EOS 0.3 12/15/2020    BASO 0.06 12/15/2020    EOSINOPHIL 6.4 12/15/2020    BASOPHIL 1.3 12/15/2020       BMP  Lab Results   Component Value Date     01/07/2022    K 4.2 01/07/2022     01/07/2022    CO2 27 01/07/2022     BUN 12 01/07/2022    CREATININE 0.9 01/07/2022    CALCIUM 9.5 01/07/2022    ANIONGAP 9 01/07/2022    ESTGFRAFRICA >60.0 01/07/2022    EGFRNONAA >60.0 01/07/2022    AST 19 12/15/2020    ALT 28 12/15/2020    PROT 7.2 12/15/2020       Lab Results   Component Value Date    BNP 46 01/07/2022    BNP 36 12/15/2020    BNP 46 10/24/2019    BNP 46 05/08/2019    BNP 46 05/08/2019    BNP 38 03/20/2019       No results found for: TSH    No results found for: SEDRATE    No results found for: CRP  No results found for: IGE     No results found for: ASPERGILLUS  No results found for: AFUMIGATUSCL     No results found for: ACE     Diagnostic Results:  I have personally reviewed today the following studies:    X-Ray Chest PA And Lateral  Narrative: EXAMINATION:  XR CHEST PA AND LATERAL    CLINICAL HISTORY:  Sleep apnea, unspecified    TECHNIQUE:  PA and lateral views of the chest were performed.    COMPARISON:  03/15/2019    FINDINGS:  The lungs are clear and free of infiltrate.  No pleural effusion or pneumothorax. The heart is not enlarged.  Impression: 1.  No acute cardiopulmonary process.    Electronically signed by: Luis Carrizales DO  Date:    04/05/2022  Time:    13:00    DIAGNOSIS   RECOMMENDATIONS   MISCELLANEOUS   Obstructive Sleep Apnea (G47.33)   NREM parasomnia   Circardian rhythm disorder   Insomnia related to Sleep initiation/sleep maintaince/terminal   periodic limb movement disorder.   Therapeutic CPAP titration to determine optimal pressure required to alleviate sleep disordered breathing.   Sleep hygiene should be reviewed to assess factors that may improve sleep quality.   Weight management and regular exercise should be initiated or continued.   Clinica correlation for restless legs, check ferritin.   Continue Melatonin       IMPRESSIONS   DIAGNOSIS   RECOMMENDATIONS   Comments added by Technician: CPAP was initiated at 5 cm H2O. CPAP was increased to 10 cm H2O   Comments added by Scorer: CPAP was initiated at 5  cm H2O and increased to 10 cm H2O. Supine REM not   achieved on final pressure.   The overall AHI was 3.3 per hour  , and the RDI was 3.3 events/hour with a central apnea index of 1.8per hour.   The most appropriate setting of CPAP was 8 cm H2O. At this setting, the sleep efficiency was 97% and the   patient was supine for 100%. The AHI was 14.7 events per hour, and the RDI was 14.7 events/hour (with 1.8   central events) and the arousal index was 25.3 per hour.The oxygen buzz was 91.0% during sleep.   The cumulative time under 88% oxygen saturation was 0.0 minutes   The total leg movements were 12 with a resulting leg movement index of 2.4/hr. Associated arousal with leg   movement index was 0.2/hr.   The underlying cardiac rhythm was most consistent with sinus rhythm. Mean heart rate during sleep was 65.6 bpm.   Additional rhythm abnormalities include frequent PVC, no PAC.   The study was initiated at 9:06:32 PM and terminated at 4:14:32 AM. Total recorded time was 428 minutes. EEG   confirmed total sleep time was 305 minutes yielding a sleep efficiency of 71.3%%. Sleep onset after lights out was   63.1 minutes with a REM latency of 133.0 minutes. The patient spent 12.0%% of the night in stage N1 sleep,   67.5%% in stage N2 sleep, 5.1%% in stage N3 and 15.4% in REM. The Arousal Index was 21.8/hour.   Sleep onset delayed by use on electronic device   Electrocardiographic data showed presence of frequent PVC, no PAC.   No snoring was audible during this study.   No significant Oxygen Desaturation   No Significant Obstructive Sleep apnea(KALIA) Optimal pressure attained.CPAP 8 cm   No Significant Central Sleep Apnea (CSA)   No Significant Upper Airway Resistance Syndrome(UARS).   No significant periodic leg movements(PLMs) during sleep. PLMI was 2.4/hr and PLMAI was 0.2/hr.   Reduced sleep efficiency, long primary sleep latency, long REM sleep latency and long slow wave latency.   Supplemental oxygen was not  administered during the study.   Obstructive Sleep Apnea (G47.33)   Juan Stephens - 1985   Page   2   of   3 Electronically Authenticated By Get Duenas MD on 05/15/2022 12:49 PM, from [phone]   Get Duenas MD   Upon electronically signing this interpretation, I certify that I have conducted a review of the raw data,   the quality of the recording, and the scoring of sleep and associated events as sufficient to allow for interpretation.   NPI: [id number]   RECOMMENDATIONS   Trial of CPAP therapy on 8 cm H2O with a Medium size Resmed Full Face Mask AirFit F20 mask and   heated humidification.   Avoid alcohol, sedatives and other CNS depressants that may worsen sleep apnea and disrupt normal sleep   architecture.   Sleep hygiene should be reviewed to assess factors that may improve sleep quality.   Weight management and regular exercise should be initiated or continued under supervison of clinician.   Return to [facility] for re-evaluation after 4 -6 weeks of therapy     Assessment/Plan:     Problem List Items Addressed This Visit     Essential hypertension - Primary     Stable Entresto           Ischemic cardiomyopathy     Stable Entresto           KALIA (obstructive sleep apnea)     Diagnostic   AHI was 9.4/hr with 54 events. 10 central apneas      CPAP titration  RECOMMENDATIONS   Trial of CPAP therapy on 8 cm H2O with a Medium size Resmed Full Face Mask AirFit F20 mask and   heated humidification                Parasomnia     Continue Melatonin               NON REM parasomnia  Cpnt Melatonin  New CPAP orders    Follow up in about 2 months (around 7/31/2022), or CPAP ordered.    This note was prepared using voice recognition system and is likely to have sound alike errors that may have been overlooked even after proof reading.  Please call me with any questions    Discussed diagnosis, its evaluation, treatment and usual course. All questions answered.      Get Duenas MD      Safe sleeping environment -- All patients with RBD and their bed partners should be counseled on ways to alter the sleeping environment to prevent injury. For patients with mild symptoms, this may be all that is needed.  Safety for both patients and bed partners is the paramount concern. Firearms should not be accessible, and sharp or easily breakable items (such as lamps) should be removed from the immediate sleeping area. In the event of continued vigorous behaviors, sleeping alone is advised. Many patients resort to using padded bed rails or sleeping in a sleeping bag [79].  Other novel strategies are in development. Exiting the bed while acting out a dream is a high-risk behavior that may result in traumatic injury [83]. A bed alarm that delivers a customized calming message at the onset of dream enactment can prevent a patient from exiting the bed and avert sleep-related injury [84].

## 2022-05-31 NOTE — ASSESSMENT & PLAN NOTE
Diagnostic   AHI was 9.4/hr with 54 events. 10 central apneas      CPAP titration  RECOMMENDATIONS   Trial of CPAP therapy on 8 cm H2O with a Medium size Resmed Full Face Mask AirFit F20 mask and   heated humidification

## 2022-06-22 DIAGNOSIS — G47.33 OSA (OBSTRUCTIVE SLEEP APNEA): Primary | ICD-10-CM

## 2022-06-22 NOTE — PROGRESS NOTES
Orders Placed This Encounter   Procedures    CPAP/BIPAP SUPPLIES     Order Specific Question:   Length of need (1-99 months):     Answer:   99     Order Specific Question:   Choose ONE mask type and its corresponding cushions and/or pillows:     Answer:    Full Face Mask, 1 per 90 days:  Full Face Cushion, (3 per 90 days)     Order Specific Question:   Choose EITHER Heated or Non-Heated Tubjing     Answer:    Non-Heated Tubing, 1 per 90 days     Order Specific Question:   All other supplies as needed as listed below:     Answer:    Headgear, 1 per 180 days     Order Specific Question:   All other supplies as needed as listed below:     Answer:    Chin Strap, 1 per 180 days     Order Specific Question:   All other supplies as needed as listed below:     Answer:    Disposable Filter, 6 per 90 days     Order Specific Question:   All other supplies as needed as listed below:     Answer:    Non-Disposable Filter, 1 per 180 days     Order Specific Question:   All other supplies as needed as listed below:     Answer:    Humidifier Chamber, 1 per 180 days

## 2022-07-07 ENCOUNTER — OFFICE VISIT (OUTPATIENT)
Dept: TRANSPLANT | Facility: CLINIC | Age: 37
End: 2022-07-07
Attending: INTERNAL MEDICINE
Payer: MEDICAID

## 2022-07-07 ENCOUNTER — HOSPITAL ENCOUNTER (OUTPATIENT)
Dept: CARDIOLOGY | Facility: HOSPITAL | Age: 37
Discharge: HOME | End: 2022-07-07
Attending: INTERNAL MEDICINE
Payer: MEDICAID

## 2022-07-07 VITALS
HEART RATE: 75 BPM | DIASTOLIC BLOOD PRESSURE: 66 MMHG | BODY MASS INDEX: 32.86 KG/M2 | HEIGHT: 70 IN | WEIGHT: 229.5 LBS | SYSTOLIC BLOOD PRESSURE: 110 MMHG

## 2022-07-07 VITALS
DIASTOLIC BLOOD PRESSURE: 85 MMHG | HEART RATE: 63 BPM | HEIGHT: 70 IN | WEIGHT: 220 LBS | SYSTOLIC BLOOD PRESSURE: 110 MMHG | BODY MASS INDEX: 31.5 KG/M2

## 2022-07-07 DIAGNOSIS — I10 ESSENTIAL HYPERTENSION: ICD-10-CM

## 2022-07-07 DIAGNOSIS — I25.5 ISCHEMIC CARDIOMYOPATHY: ICD-10-CM

## 2022-07-07 DIAGNOSIS — I25.10 CORONARY ARTERY DISEASE INVOLVING NATIVE CORONARY ARTERY OF NATIVE HEART WITHOUT ANGINA PECTORIS: ICD-10-CM

## 2022-07-07 DIAGNOSIS — R74.8 LOW SERUM HDL: ICD-10-CM

## 2022-07-07 DIAGNOSIS — I50.42 CHRONIC COMBINED SYSTOLIC AND DIASTOLIC CONGESTIVE HEART FAILURE: Primary | ICD-10-CM

## 2022-07-07 DIAGNOSIS — I21.19 INFERIOR MI: ICD-10-CM

## 2022-07-07 DIAGNOSIS — G47.33 OSA (OBSTRUCTIVE SLEEP APNEA): ICD-10-CM

## 2022-07-07 DIAGNOSIS — I50.42 CHRONIC COMBINED SYSTOLIC AND DIASTOLIC CONGESTIVE HEART FAILURE: ICD-10-CM

## 2022-07-07 LAB
ASCENDING AORTA: 2.57 CM
AV INDEX (PROSTH): 0.69
AV MEAN GRADIENT: 4 MMHG
AV PEAK GRADIENT: 7 MMHG
AV VALVE AREA: 3.5 CM2
AV VELOCITY RATIO: 0.64
BSA FOR ECHO PROCEDURE: 2.22 M2
CV ECHO LV RWT: 0.33 CM
DOP CALC AO PEAK VEL: 1.28 M/S
DOP CALC AO VTI: 23.17 CM
DOP CALC LVOT AREA: 5.1 CM2
DOP CALC LVOT DIAMETER: 2.54 CM
DOP CALC LVOT PEAK VEL: 0.82 M/S
DOP CALC LVOT STROKE VOLUME: 81.03 CM3
DOP CALCLVOT PEAK VEL VTI: 16 CM
E WAVE DECELERATION TIME: 224.17 MSEC
E/A RATIO: 1.51
E/E' RATIO: 6.96 M/S
ECHO LV POSTERIOR WALL: 0.92 CM (ref 0.6–1.1)
EJECTION FRACTION: 45 %
FRACTIONAL SHORTENING: 21 % (ref 28–44)
INTERVENTRICULAR SEPTUM: 0.96 CM (ref 0.6–1.1)
LA MAJOR: 4.89 CM
LA MINOR: 4.85 CM
LA WIDTH: 3.89 CM
LEFT ATRIUM SIZE: 3.96 CM
LEFT ATRIUM VOLUME INDEX MOD: 21.7 ML/M2
LEFT ATRIUM VOLUME INDEX: 29.4 ML/M2
LEFT ATRIUM VOLUME MOD: 47.06 CM3
LEFT ATRIUM VOLUME: 63.77 CM3
LEFT INTERNAL DIMENSION IN SYSTOLE: 4.38 CM (ref 2.1–4)
LEFT VENTRICLE DIASTOLIC VOLUME INDEX: 69.98 ML/M2
LEFT VENTRICLE DIASTOLIC VOLUME: 151.85 ML
LEFT VENTRICLE MASS INDEX: 93 G/M2
LEFT VENTRICLE SYSTOLIC VOLUME INDEX: 40 ML/M2
LEFT VENTRICLE SYSTOLIC VOLUME: 86.75 ML
LEFT VENTRICULAR INTERNAL DIMENSION IN DIASTOLE: 5.57 CM (ref 3.5–6)
LEFT VENTRICULAR MASS: 200.85 G
LV LATERAL E/E' RATIO: 5.33 M/S
LV SEPTAL E/E' RATIO: 10 M/S
MV A" WAVE DURATION": 10.28 MSEC
MV PEAK A VEL: 0.53 M/S
MV PEAK E VEL: 0.8 M/S
MV STENOSIS PRESSURE HALF TIME: 65.01 MS
MV VALVE AREA P 1/2 METHOD: 3.38 CM2
PISA TR MAX VEL: 2.33 M/S
PULM VEIN S/D RATIO: 1.26
PV PEAK D VEL: 0.38 M/S
PV PEAK S VEL: 0.48 M/S
QEF: 43 %
RA MAJOR: 4.78 CM
RA PRESSURE: 3 MMHG
RA WIDTH: 3.73 CM
RIGHT VENTRICULAR END-DIASTOLIC DIMENSION: 3.82 CM
RV TISSUE DOPPLER FREE WALL SYSTOLIC VELOCITY 1 (APICAL 4 CHAMBER VIEW): 10.73 CM/S
SINUS: 2.98 CM
STJ: 2.46 CM
TDI LATERAL: 0.15 M/S
TDI SEPTAL: 0.08 M/S
TDI: 0.12 M/S
TR MAX PG: 22 MMHG
TRICUSPID ANNULAR PLANE SYSTOLIC EXCURSION: 1.37 CM
TV REST PULMONARY ARTERY PRESSURE: 25 MMHG

## 2022-07-07 PROCEDURE — 93306 ECHO (CUPID ONLY): ICD-10-PCS | Mod: 26,,, | Performed by: INTERNAL MEDICINE

## 2022-07-07 PROCEDURE — 4010F ACE/ARB THERAPY RXD/TAKEN: CPT | Mod: CPTII,,, | Performed by: INTERNAL MEDICINE

## 2022-07-07 PROCEDURE — 3074F PR MOST RECENT SYSTOLIC BLOOD PRESSURE < 130 MM HG: ICD-10-PCS | Mod: CPTII,,, | Performed by: INTERNAL MEDICINE

## 2022-07-07 PROCEDURE — 99999 PR PBB SHADOW E&M-EST. PATIENT-LVL IV: ICD-10-PCS | Mod: PBBFAC,,, | Performed by: INTERNAL MEDICINE

## 2022-07-07 PROCEDURE — 93306 TTE W/DOPPLER COMPLETE: CPT

## 2022-07-07 PROCEDURE — 1159F PR MEDICATION LIST DOCUMENTED IN MEDICAL RECORD: ICD-10-PCS | Mod: CPTII,,, | Performed by: INTERNAL MEDICINE

## 2022-07-07 PROCEDURE — 3078F PR MOST RECENT DIASTOLIC BLOOD PRESSURE < 80 MM HG: ICD-10-PCS | Mod: CPTII,,, | Performed by: INTERNAL MEDICINE

## 2022-07-07 PROCEDURE — 99999 PR PBB SHADOW E&M-EST. PATIENT-LVL IV: CPT | Mod: PBBFAC,,, | Performed by: INTERNAL MEDICINE

## 2022-07-07 PROCEDURE — 93306 TTE W/DOPPLER COMPLETE: CPT | Mod: 26,,, | Performed by: INTERNAL MEDICINE

## 2022-07-07 PROCEDURE — 1160F RVW MEDS BY RX/DR IN RCRD: CPT | Mod: CPTII,,, | Performed by: INTERNAL MEDICINE

## 2022-07-07 PROCEDURE — 3078F DIAST BP <80 MM HG: CPT | Mod: CPTII,,, | Performed by: INTERNAL MEDICINE

## 2022-07-07 PROCEDURE — 4010F PR ACE/ARB THEARPY RXD/TAKEN: ICD-10-PCS | Mod: CPTII,,, | Performed by: INTERNAL MEDICINE

## 2022-07-07 PROCEDURE — 3008F BODY MASS INDEX DOCD: CPT | Mod: CPTII,,, | Performed by: INTERNAL MEDICINE

## 2022-07-07 PROCEDURE — 99214 PR OFFICE/OUTPT VISIT, EST, LEVL IV, 30-39 MIN: ICD-10-PCS | Mod: S$PBB,,, | Performed by: INTERNAL MEDICINE

## 2022-07-07 PROCEDURE — 1159F MED LIST DOCD IN RCRD: CPT | Mod: CPTII,,, | Performed by: INTERNAL MEDICINE

## 2022-07-07 PROCEDURE — 3074F SYST BP LT 130 MM HG: CPT | Mod: CPTII,,, | Performed by: INTERNAL MEDICINE

## 2022-07-07 PROCEDURE — 1160F PR REVIEW ALL MEDS BY PRESCRIBER/CLIN PHARMACIST DOCUMENTED: ICD-10-PCS | Mod: CPTII,,, | Performed by: INTERNAL MEDICINE

## 2022-07-07 PROCEDURE — 99214 OFFICE O/P EST MOD 30 MIN: CPT | Mod: S$PBB,,, | Performed by: INTERNAL MEDICINE

## 2022-07-07 PROCEDURE — 3008F PR BODY MASS INDEX (BMI) DOCUMENTED: ICD-10-PCS | Mod: CPTII,,, | Performed by: INTERNAL MEDICINE

## 2022-07-07 PROCEDURE — 99214 OFFICE O/P EST MOD 30 MIN: CPT | Mod: PBBFAC,25 | Performed by: INTERNAL MEDICINE

## 2022-07-07 RX ORDER — TALC
6 POWDER (GRAM) TOPICAL NIGHTLY PRN
COMMUNITY

## 2022-07-07 RX ORDER — DAPAGLIFLOZIN 10 MG/1
10 TABLET, FILM COATED ORAL DAILY
Qty: 30 TABLET | Refills: 5 | Status: SHIPPED | OUTPATIENT
Start: 2022-07-07 | End: 2022-12-13

## 2022-07-07 RX ORDER — DAPAGLIFLOZIN 10 MG/1
10 TABLET, FILM COATED ORAL DAILY
Qty: 30 TABLET | Refills: 5 | Status: SHIPPED | OUTPATIENT
Start: 2022-07-07 | End: 2022-07-07 | Stop reason: SDUPTHER

## 2022-07-08 NOTE — PROGRESS NOTES
Subjective:     Patient ID:  Juan Stephens is a 36 y.o. male who presents for follow-up of Congestive Heart Failure  Last seen 2022  HPI:  37 yo WM is the son of one of our patients who had a heart transplant and subsequently  of metastatic cancer in 2019.  He reports that in 2018 he suffered a massive myocardial infarction and angiography demonstrated totally occluded right coronary artery.  The coronary stent, Synergy 3.5 x 12 drug-eluting stent was placed.  He had no warning prior to that heart attack and onset of chest pain occurred while showering.  He did well for approximately 2 weeks and then presented back to the hospital with acute congestive heart failure.  Prior to the 2018 hospitalization he was drinking half case of beer per week and also smoking 1 per day.  He quit both of these activities 2019.    He wanted to be seen for his heart failure came to [place] March 15, 2019 and while driving developed recurrent chest pain.  He went to the [facility] emergency room and was subsequently taken to the cardiac catheterization laboratory.  An ulcerated 90% narrowing was noted in the proximal 1st diagonal.  He underwent placement of drug-eluting stents 2.5x 22 and 2.5 x 8 placed in that region overlapping one another with no residual stenosis.  There was a 40% of the 3rd marginal branch.  Right coronary stent previously placed in 2018 at an outside hospital was widely patent.  His left ventricular end-diastolic pressure was normal at 7 mm Hg.  An echocardiogram demonstrated estimated ejection fraction 45% and grade 1 diastolic dysfunction.  Wall motion was described as a globally hypokinetic.    He saw me 2019 and I recommended the following:  Change to aspirin 81 mg qd--bolivar not ecotrin  Change carvedilol to metoprolol succinate 50 mg once daily  They will gradually go up at home to 200 mg a day  Other recommendations for next visit with HF clinic at home  would be to do the following:   Increase entresto to  mg twice a day   Reduce bumetanide to 3 days/week   Consider replacing potassium with spironolactone 25 mg once a day    He saw Dr. Hilton May 2019 but no changes made in regimen.  I was contacted Sept 2019 re: refill.  After reviewing the clinic note and VS from May 2019, I increased metoprolol succinate to 100 mg qd.  At visit 1 month later on Oct 24, 2019 I increased Entresto to  mg BID and 2 weeks later increased metoprolol succinate to 200 mg qd.  At visits in June and Dec 2020 he was doing well no changes made.  When seen January 2022 asked for sleep consult re: sleep apnea and stopped Effient.    He has been doing well without angina.  No dyspnea on exertion, orthopnea or PND.  Still having fatigue.    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    Review of Systems   Constitutional: Positive for malaise/fatigue and weight gain. Negative for chills, fever, night sweats and weight loss.   Cardiovascular: Negative for chest pain, dyspnea on exertion, irregular heartbeat, leg swelling, near-syncope, orthopnea, palpitations, paroxysmal nocturnal dyspnea and syncope.   Respiratory: Positive for sleep disturbances due to breathing (now on treatment for KALIA). Negative for cough, sputum production and wheezing.    Hematologic/Lymphatic: Does not bruise/bleed easily.   Musculoskeletal: Negative for arthritis, joint pain and stiffness.   Gastrointestinal: Negative for hematochezia and melena.        Still suffers with GE reflux   Genitourinary: Negative for hematuria.   Neurological: Negative for brief paralysis, focal weakness, seizures and weakness.   Psychiatric/Behavioral:        Bad dreams have improved on treatment     Objective:   Physical Exam  Constitutional:       General: He is not in acute distress.     Appearance: He is well-developed. He is obese. He is not diaphoretic.      Comments: /66  "(BP Location: Left arm, Patient Position: Sitting, BP Method: Medium (Automatic))   Pulse 75   Ht 5' 10" (1.778 m)   Wt 104.1 kg (229 lb 8 oz)   BMI 32.93 kg/m²   Last visit wt 234 lb  Obese white male in no acute distress.  Friendly, cooperative     HENT:      Head: Normocephalic and atraumatic.   Eyes:      General: No scleral icterus.        Right eye: No discharge.         Left eye: No discharge.      Conjunctiva/sclera: Conjunctivae normal.   Neck:      Thyroid: No thyromegaly.      Vascular: No JVD.   Cardiovascular:      Rate and Rhythm: Normal rate and regular rhythm.      Heart sounds: Normal heart sounds. No murmur heard.    No gallop.   Pulmonary:      Effort: Pulmonary effort is normal.      Breath sounds: Normal breath sounds.   Abdominal:      General: Bowel sounds are normal. There is no distension.      Palpations: Abdomen is soft. There is no mass.      Tenderness: There is no abdominal tenderness. There is no guarding or rebound.   Musculoskeletal:         General: No swelling or tenderness.      Right lower leg: No edema.      Left lower leg: No edema.   Skin:     General: Skin is warm and dry.   Neurological:      General: No focal deficit present.      Mental Status: Mental status is at baseline.   Psychiatric:         Mood and Affect: Mood normal.         Behavior: Behavior normal.         Thought Content: Thought content normal.         Judgment: Judgment normal.       Lab Results   Component Value Date    BNP 46 01/07/2022     07/07/2022    K 4.5 07/07/2022    MG 2.1 10/24/2019     07/07/2022    CO2 30 (H) 07/07/2022    BUN 15 07/07/2022    CREATININE 0.9 07/07/2022     07/07/2022    HGBA1C 5.1 03/15/2019    AST 25 07/07/2022    ALT 43 07/07/2022    ALBUMIN 4.1 07/07/2022    PROT 7.5 07/07/2022    BILITOT 0.8 07/07/2022    WBC 4.69 12/15/2020    HGB 15.1 12/15/2020    HCT 45.1 12/15/2020     (L) 12/15/2020    CHOL 139 07/07/2022    HDL 33 (L) 07/07/2022    " LDLCALC 65.0 07/07/2022    TRIG 205 (H) 07/07/2022     His lipids and complete metabolic profile performed 12/15/2020 total cholesterol 116, HDL 29, LDL 41, triglyceride 231, LFTs normal, glucose normal, electrolytes and renal function normal, BNP 36     7/7/2022 ECHO  · The left ventricle is normal in size with mildly decreased systolic function. The estimated ejection fraction is 45%.  · The quantitatively derived ejection fraction is 43%.  · There are segmental left ventricular wall motion abnormalities.  · Normal right ventricular size with normal right ventricular systolic function.  · Grade I left ventricular diastolic dysfunction.  · The estimated PA systolic pressure is 25 mmHg.  · Normal central venous pressure (3 mmHg).      12/15/2020 echocardiogram Summary  · The left ventricle is mildly enlarged with mildly decreased systolic function. The estimated ejection fraction is 40%.  · Normal right ventricular size with low normal right ventricular systolic function.  · Grade I left ventricular diastolic dysfunction.  · The estimated PA systolic pressure is 24 mmHg.  · Normal central venous pressure (3 mmHg).      Assessment:     1. Chronic combined systolic and diastolic congestive heart failure    2. Ischemic cardiomyopathy    3. Coronary artery disease involving native coronary artery of native heart without angina pectoris    4. Essential hypertension    5. Low serum HDL    6. Inferior MI    7. KALIA (obstructive sleep apnea)      Plan:   Do not skip meals  Avoid corn syrup sweeteners  Restrict diet to no rice, no pasta, no bread, no potatoes and no man-made sweets.  You can consume all of the salads and vegetables that you want.  Limit meat to 4-6 ounces per day.  Do not skip meals.  Suggest oatmeal or eggs for breakfast and do not eat breakfast cereals  Regular walking program or if possible swim or walk in pool or other aerobic exercises working up to 45-60 min daily    Since he continues to do well he  does not need to perform a CPX     Same meds plus add Farxiga 10 mg qd in view of CHF and may help with weight loss    BMP in 1 month at home    BMP and see me in 6 months

## 2022-08-10 ENCOUNTER — TELEPHONE (OUTPATIENT)
Dept: TRANSPLANT | Facility: CLINIC | Age: 37
End: 2022-08-10
Payer: MEDICAID

## 2022-08-10 NOTE — TELEPHONE ENCOUNTER
Am:f/u on yesterdays nurse lab reminder:   Called pt and confirmed with him he did go to local lab and have lab work drawn yesterday  Pt told me he went to [facility] Also , pt told me he did start taking Farxiga on 7/8/22     4:25 pm: Still have not received lab results   I called and spoke w/ Clifford in the lab   Said he has results, obtained this office fax number and said will fax now  Will await results.     4:30 pm:  Received bmp results from todays collection via email fax:  Na/K:  139/4.2    Cl/Co2:  106/29      Bun/Cr;  16/0.94

## 2022-09-28 ENCOUNTER — TELEPHONE (OUTPATIENT)
Dept: PULMONOLOGY | Facility: CLINIC | Age: 37
End: 2022-09-28
Payer: MEDICAID

## 2022-09-28 NOTE — TELEPHONE ENCOUNTER
----- Message from Addis Torre sent at 9/28/2022  8:43 AM CDT -----  Contact: Alexandria Ibrahim is calling in regards to getting appointment scheduling about the cpap machine and some question she had. Please call [phone].        Thanks  CF

## 2022-10-05 ENCOUNTER — TELEPHONE (OUTPATIENT)
Dept: PULMONOLOGY | Facility: CLINIC | Age: 37
End: 2022-10-05
Payer: MEDICAID

## 2022-10-05 ENCOUNTER — OFFICE VISIT (OUTPATIENT)
Dept: PULMONOLOGY | Facility: CLINIC | Age: 37
End: 2022-10-05
Payer: MEDICAID

## 2022-10-05 VITALS — HEIGHT: 70 IN | BODY MASS INDEX: 32.78 KG/M2 | WEIGHT: 229 LBS

## 2022-10-05 DIAGNOSIS — G47.59 OTHER PARASOMNIA: ICD-10-CM

## 2022-10-05 DIAGNOSIS — I10 ESSENTIAL HYPERTENSION: ICD-10-CM

## 2022-10-05 DIAGNOSIS — G47.61 PLMD (PERIODIC LIMB MOVEMENT DISORDER): ICD-10-CM

## 2022-10-05 DIAGNOSIS — G47.33 OSA (OBSTRUCTIVE SLEEP APNEA): Primary | ICD-10-CM

## 2022-10-05 DIAGNOSIS — I25.5 ISCHEMIC CARDIOMYOPATHY: ICD-10-CM

## 2022-10-05 PROCEDURE — 1160F PR REVIEW ALL MEDS BY PRESCRIBER/CLIN PHARMACIST DOCUMENTED: ICD-10-PCS | Mod: CPTII,95,, | Performed by: INTERNAL MEDICINE

## 2022-10-05 PROCEDURE — 4010F PR ACE/ARB THEARPY RXD/TAKEN: ICD-10-PCS | Mod: CPTII,95,, | Performed by: INTERNAL MEDICINE

## 2022-10-05 PROCEDURE — 3008F PR BODY MASS INDEX (BMI) DOCUMENTED: ICD-10-PCS | Mod: CPTII,95,, | Performed by: INTERNAL MEDICINE

## 2022-10-05 PROCEDURE — 3008F BODY MASS INDEX DOCD: CPT | Mod: CPTII,95,, | Performed by: INTERNAL MEDICINE

## 2022-10-05 PROCEDURE — 1159F PR MEDICATION LIST DOCUMENTED IN MEDICAL RECORD: ICD-10-PCS | Mod: CPTII,95,, | Performed by: INTERNAL MEDICINE

## 2022-10-05 PROCEDURE — 99213 OFFICE O/P EST LOW 20 MIN: CPT | Mod: 95,,, | Performed by: INTERNAL MEDICINE

## 2022-10-05 PROCEDURE — 99213 PR OFFICE/OUTPT VISIT, EST, LEVL III, 20-29 MIN: ICD-10-PCS | Mod: 95,,, | Performed by: INTERNAL MEDICINE

## 2022-10-05 PROCEDURE — 1160F RVW MEDS BY RX/DR IN RCRD: CPT | Mod: CPTII,95,, | Performed by: INTERNAL MEDICINE

## 2022-10-05 PROCEDURE — 1159F MED LIST DOCD IN RCRD: CPT | Mod: CPTII,95,, | Performed by: INTERNAL MEDICINE

## 2022-10-05 PROCEDURE — 4010F ACE/ARB THERAPY RXD/TAKEN: CPT | Mod: CPTII,95,, | Performed by: INTERNAL MEDICINE

## 2022-10-05 NOTE — PROGRESS NOTES
The patient location is: [facility]  The chief complaint leading to consultation is: KALIA    Visit type: audiovisual    Face to Face time with patient: 10 minutes  15 minutes of total time spent on the encounter, which includes face to face time and non-face to face time preparing to see the patient (eg, review of tests), Obtaining and/or reviewing separately obtained history, Documenting clinical information in the electronic or other health record, Independently interpreting results (not separately reported) and communicating results to the patient/family/caregiver, or Care coordination (not separately reported).         Each patient to whom he or she provides medical services by telemedicine is:  (1) informed of the relationship between the physician and patient and the respective role of any other health care provider with respect to management of the patient; and (2) notified that he or she may decline to receive medical services by telemedicine and may withdraw from such care at any time.    Notes:                                                    Pulmonary Outpatient  Visit     Subjective:       Patient ID: Juan Stephens is a 37 y.o. male.    Chief Complaint: Apnea    The following portions of the patient's history were reviewed and updated as appropriate:   He  has a past medical history of CAD (coronary artery disease) (dEC 2019), CHF (congestive heart failure), GERD (gastroesophageal reflux disease), Hypertension, Inferior MI (12/2018), Ischemic cardiomyopathy (11/4/2019), and Low serum HDL (3/20/2019).  He does not have any pertinent problems on file.  He  has a past surgical history that includes *pr left heart cath,percutaneous (12/31/2018); Coronary angioplasty with stent; Left heart catheterization (Left, 3/18/2019); and Hernia repair (Left).  His family history includes Cancer in his father; Heart disease in his father; Heart failure in his father; Hypertension in his brother; No Known Problems  in his mother.  He  reports that he quit smoking about 3 years ago. His smoking use included cigarettes. He has a 15.00 pack-year smoking history. His smokeless tobacco use includes snuff. He reports current alcohol use. He reports that he does not use drugs.  He has a current medication list which includes the following prescription(s): aspirin, atorvastatin, bumetanide, cetirizine, dapagliflozin, dexilant, entresto, melatonin, metoprolol succinate, and nitroglycerin.  Current Outpatient Medications on File Prior to Visit   Medication Sig Dispense Refill    aspirin (ECOTRIN) 81 MG EC tablet Take 1 tablet (81 mg total) by mouth once daily. 30 tablet 11    atorvastatin (LIPITOR) 40 MG tablet TAKE ONE TABLET BY MOUTH EVERY EVENING FOR cholesterol 30 tablet 5    bumetanide (BUMEX) 1 MG tablet as needed.      cetirizine (ZYRTEC) 10 MG tablet Take 10 mg by mouth once daily.      dapagliflozin (FARXIGA) 10 mg tablet Take 1 tablet (10 mg total) by mouth once daily. 30 tablet 5    DEXILANT 30 mg CpDM Take 30 mg by mouth once daily.      ENTRESTO  mg per tablet TAKE ONE TABLET BY MOUTH TWICE DAILY 60 tablet 6    melatonin (MELATIN) 3 mg tablet Take 6 mg by mouth nightly as needed for Insomnia.      metoprolol succinate (TOPROL-XL) 200 MG 24 hr tablet TAKE 1 TABLET BY MOUTH EVERY DAY 30 tablet 5    nitroGLYCERIN (NITROSTAT) 0.4 MG SL tablet Place 1 tablet (0.4 mg total) under the tongue every 5 (five) minutes as needed for Chest pain. 25 tablet 5     No current facility-administered medications on file prior to visit.     He has No Known Allergies..    EPWORTH SLEEPINESS SCALE 10/5/2022   Sitting and reading 2   Watching TV 2   Sitting, inactive in a public place (e.g. a theatre or a meeting) 2   As a passenger in a car for an hour without a break 0   Lying down to rest in the afternoon when circumstances permit 1   Sitting and talking to someone 0   Sitting quietly after a lunch without alcohol 2   In a car, while  "stopped for a few minutes in traffic 0   Total score 9        Juan Stephens is 36 y.o.  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  CXR was revewied      2022  Here to review results  Mild KALIA on initial diagnostic with some centrals  CPAP titration 8 cm was optimal  Discussed indications and goals of therapy  Will expedite set up    10/05/2022  Here for CPAp adherence  Using and benefit  Wears for 8 hours  Bed time 0830 pm, wake time 5-6 am  [place] 9  Usage > 4 hrs was 85%  AHI 0.0  Used 30 out of 30 days  No more vivid dreams          BP Readings from Last 3 Encounters:   22 110/85   22 110/66   22 130/84     Snoring / Sleep:     Mulino Questionnaire (validated KALIA screening questionnaire)    YES -- Snoring/apnea    YES -- Fatigue    Body mass index is 32.86 kg/m².  (>25 is overweight, >30 is obese)    Blood Pressure = Hypertension  (PreHTN 120-139/80-89, Stg1 140-159/90-99, Stg2 >160/>100)  [place] = 3 of three KALIA categories are positive (high risk is 2-3 positive categories)     [place] Sleepiness Scale TOTAL =   12  (validated sleepiness questionnaire with a higher score indicating greater sleepiness; range 0-24)  No flowsheet data found.    STOP-Bang Questionnaire (validated KALIA screening questionnaire)  Negative unless checked off.  [x] Snoring    [x]  Tired/Fatigued/Sleepy  [x] Obstruction (apneas/choking)  [x] Pressure (HTN)  [] BMI >35  [] Age >50  [] Neck >40 cm  [x] Gender male   STOP-Bang = 5* (low risk 0-2,high risk 3-8)    Neck circumference 18" [?KALIA risk if >43cm (17in) male or >41cm (15.5 in) female]    Sleep inventory  Bed time 8 pm, Wake time 6 am  SOL 5 " "mins  Legs feel odd and restless at night, delay sleep  No sleep attacks  No signs of narcolepsy  Vivid dreams  Violent movement in sleep and dreams  Grind teeth          Review of Systems   Respiratory:  Negative for apnea and snoring.    Psychiatric/Behavioral:  Negative for sleep disturbance.    All other systems reviewed and are negative.    Outpatient Encounter Medications as of 10/5/2022   Medication Sig Dispense Refill    aspirin (ECOTRIN) 81 MG EC tablet Take 1 tablet (81 mg total) by mouth once daily. 30 tablet 11    atorvastatin (LIPITOR) 40 MG tablet TAKE ONE TABLET BY MOUTH EVERY EVENING FOR cholesterol 30 tablet 5    bumetanide (BUMEX) 1 MG tablet as needed.      cetirizine (ZYRTEC) 10 MG tablet Take 10 mg by mouth once daily.      dapagliflozin (FARXIGA) 10 mg tablet Take 1 tablet (10 mg total) by mouth once daily. 30 tablet 5    DEXILANT 30 mg CpDM Take 30 mg by mouth once daily.      ENTRESTO  mg per tablet TAKE ONE TABLET BY MOUTH TWICE DAILY 60 tablet 6    melatonin (MELATIN) 3 mg tablet Take 6 mg by mouth nightly as needed for Insomnia.      metoprolol succinate (TOPROL-XL) 200 MG 24 hr tablet TAKE 1 TABLET BY MOUTH EVERY DAY 30 tablet 5    nitroGLYCERIN (NITROSTAT) 0.4 MG SL tablet Place 1 tablet (0.4 mg total) under the tongue every 5 (five) minutes as needed for Chest pain. 25 tablet 5     No facility-administered encounter medications on file as of 10/5/2022.       Objective:     Vital Signs (Most Recent)  Height and Weight  Height: 5' 10" (177.8 cm)  Weight: 103.9 kg (229 lb)  BSA (Calculated - sq m): 2.26 sq meters  BMI (Calculated): 32.9  Weight in (lb) to have BMI = 25: 173.9]  Wt Readings from Last 2 Encounters:   10/05/22 103.9 kg (229 lb)   07/07/22 99.8 kg (220 lb)       Physical Exam   Constitutional: He is oriented to person, place, and time. He appears well-developed and well-nourished.   HENT:   Head: Normocephalic.   Mouth/Throat: Mallampati Score: III.   Neck: No JVD " present.   Cardiovascular: Normal rate and intact distal pulses.   No murmur heard.  Pulmonary/Chest: Normal expansion, effort normal and breath sounds normal.   Abdominal: Soft. Bowel sounds are normal.   Musculoskeletal:         General: No edema. Normal range of motion.      Cervical back: Normal range of motion and neck supple.   Lymphadenopathy:     He has no axillary adenopathy.   Neurological: He is alert and oriented to person, place, and time.   Skin: Skin is warm and dry.   Psychiatric: He has a normal mood and affect.   Nursing note and vitals reviewed.    Laboratory  Lab Results   Component Value Date    WBC 4.69 12/15/2020    RBC 5.07 12/15/2020    HGB 15.1 12/15/2020    HCT 45.1 12/15/2020    MCV 89 12/15/2020    MCH 29.8 12/15/2020    MCHC 33.5 12/15/2020    RDW 12.4 12/15/2020     (L) 12/15/2020    MPV 10.7 12/15/2020    GRAN 2.6 12/15/2020    GRAN 55.3 12/15/2020    LYMPH 1.4 12/15/2020    LYMPH 29.6 12/15/2020    MONO 0.3 12/15/2020    MONO 7.0 12/15/2020    EOS 0.3 12/15/2020    BASO 0.06 12/15/2020    EOSINOPHIL 6.4 12/15/2020    BASOPHIL 1.3 12/15/2020       BMP  Lab Results   Component Value Date     07/07/2022    K 4.5 07/07/2022     07/07/2022    CO2 30 (H) 07/07/2022    BUN 15 07/07/2022    CREATININE 0.9 07/07/2022    CALCIUM 9.6 07/07/2022    ANIONGAP 6 (L) 07/07/2022    ESTGFRAFRICA >60.0 07/07/2022    EGFRNONAA >60.0 07/07/2022    AST 25 07/07/2022    ALT 43 07/07/2022    PROT 7.5 07/07/2022       Lab Results   Component Value Date    BNP 46 01/07/2022    BNP 36 12/15/2020    BNP 46 10/24/2019    BNP 46 05/08/2019    BNP 46 05/08/2019    BNP 38 03/20/2019       No results found for: TSH    No results found for: SEDRATE    No results found for: CRP  No results found for: IGE     No results found for: ASPERGILLUS  No results found for: AFUMIGATUSCL     No results found for: ACE     Diagnostic Results:  I have personally reviewed today the following studies:    iCode  Compliance Report for Juan Stephens Date Collected: 10/05/2022  Patient Information  Patient name: Juan Stephens  Gender: Male  YOB: 1985  Therapy start date: 2022  Device: [id number]  Patient ID: [id number]  Address 1:  Address 2:  Country: [place]  City: [place]  State: [place]  ZIP code:  Report information  Device serial number: [id number]  iCode strin  Provider Information  Organization: [facility]  Address 1: [address] [place] [address]  Address 2:  Country: [place]  City: [place]  State: [place]  ZIP code: [address]  Phone: [phone]  Physician Information  Name:  Organization:  Address 1:  Address 2:  Country:  City:  State:  ZIP code:  Phone:  Report Date iCode  period  Best 30 Days  Compliance  Days of  therapy (>4h)  Average  daily use  time  Average  P95  Average  pressure  Average  AHI  Average  SNI  High leak time  (%)  10/05/2022 60 30 / 30 (100%) 51 / 60 (85%) 7:18 8.0 0 0%  2022 60 25 / 30 (83%) 47 / 60 (78%) 6:54 8.0 0 0%  2022 90 25 / 30 (83%) 62 / 90 (69%) 6:48 8.0 0 0%  Note: the number in parentheses next to each date indicates the number of days that report covers  Assessment/Plan:     Problem List Items Addressed This Visit       PLMD (periodic limb movement disorder)    Essential hypertension     stable         Ischemic cardiomyopathy     Stable on ENTRESTO    Follow with cardiology         KALIA (obstructive sleep apnea) - Primary     On CPAP 8 cm  Usage > 4 hrs was 85%  Using and benefits  New supplies         Relevant Orders    CPAP/BIPAP SUPPLIES    Parasomnia     Resolved  On Melatonin          NON REM parasomnia: resolved  Cont Melatonin  New CPAP supplies ordered  Adherent with CPAP and benefits    Follow up in about 1 year (around 10/5/2023), or weight loss, CPAP supplies, using and benefits.    This note was prepared using voice recognition system and is likely to have sound alike errors that may have been  overlooked even after proof reading.  Please call me with any questions    Discussed diagnosis, its evaluation, treatment and usual course. All questions answered.      Get Duenas MD     Safe sleeping environment -- All patients with RBD and their bed partners should be counseled on ways to alter the sleeping environment to prevent injury. For patients with mild symptoms, this may be all that is needed.  Safety for both patients and bed partners is the paramount concern. Firearms should not be accessible, and sharp or easily breakable items (such as lamps) should be removed from the immediate sleeping area. In the event of continued vigorous behaviors, sleeping alone is advised. Many patients resort to using padded bed rails or sleeping in a sleeping bag [79].  Other novel strategies are in development. Exiting the bed while acting out a dream is a high-risk behavior that may result in traumatic injury [83]. A bed alarm that delivers a customized calming message at the onset of dream enactment can prevent a patient from exiting the bed and avert sleep-related injury [84].

## 2023-01-17 ENCOUNTER — OFFICE VISIT (OUTPATIENT)
Dept: TRANSPLANT | Facility: CLINIC | Age: 38
End: 2023-01-17
Attending: INTERNAL MEDICINE
Payer: MEDICAID

## 2023-01-17 ENCOUNTER — LAB VISIT (OUTPATIENT)
Dept: LAB | Facility: HOSPITAL | Age: 38
End: 2023-01-17
Attending: INTERNAL MEDICINE
Payer: MEDICAID

## 2023-01-17 VITALS
DIASTOLIC BLOOD PRESSURE: 77 MMHG | HEART RATE: 77 BPM | HEIGHT: 70 IN | BODY MASS INDEX: 32.22 KG/M2 | SYSTOLIC BLOOD PRESSURE: 124 MMHG | WEIGHT: 225.06 LBS

## 2023-01-17 DIAGNOSIS — I25.5 ISCHEMIC CARDIOMYOPATHY: ICD-10-CM

## 2023-01-17 DIAGNOSIS — G47.33 OSA (OBSTRUCTIVE SLEEP APNEA): ICD-10-CM

## 2023-01-17 DIAGNOSIS — I21.19 INFERIOR MI: ICD-10-CM

## 2023-01-17 DIAGNOSIS — I50.42 CHRONIC COMBINED SYSTOLIC AND DIASTOLIC CONGESTIVE HEART FAILURE: Primary | ICD-10-CM

## 2023-01-17 DIAGNOSIS — I10 ESSENTIAL HYPERTENSION: ICD-10-CM

## 2023-01-17 DIAGNOSIS — I50.42 CHRONIC COMBINED SYSTOLIC AND DIASTOLIC CONGESTIVE HEART FAILURE: ICD-10-CM

## 2023-01-17 DIAGNOSIS — I25.10 CORONARY ARTERY DISEASE INVOLVING NATIVE CORONARY ARTERY OF NATIVE HEART WITHOUT ANGINA PECTORIS: ICD-10-CM

## 2023-01-17 DIAGNOSIS — R74.8 LOW SERUM HDL: ICD-10-CM

## 2023-01-17 LAB
ANION GAP SERPL CALC-SCNC: 11 MMOL/L (ref 8–16)
BUN SERPL-MCNC: 15 MG/DL (ref 6–20)
CALCIUM SERPL-MCNC: 9.6 MG/DL (ref 8.7–10.5)
CHLORIDE SERPL-SCNC: 105 MMOL/L (ref 95–110)
CO2 SERPL-SCNC: 22 MMOL/L (ref 23–29)
CREAT SERPL-MCNC: 1.1 MG/DL (ref 0.5–1.4)
EST. GFR  (NO RACE VARIABLE): >60 ML/MIN/1.73 M^2
GLUCOSE SERPL-MCNC: 105 MG/DL (ref 70–110)
POTASSIUM SERPL-SCNC: 3.9 MMOL/L (ref 3.5–5.1)
SODIUM SERPL-SCNC: 138 MMOL/L (ref 136–145)

## 2023-01-17 PROCEDURE — 99214 OFFICE O/P EST MOD 30 MIN: CPT | Mod: PBBFAC | Performed by: INTERNAL MEDICINE

## 2023-01-17 PROCEDURE — 3008F BODY MASS INDEX DOCD: CPT | Mod: CPTII,,, | Performed by: INTERNAL MEDICINE

## 2023-01-17 PROCEDURE — 4010F PR ACE/ARB THEARPY RXD/TAKEN: ICD-10-PCS | Mod: CPTII,,, | Performed by: INTERNAL MEDICINE

## 2023-01-17 PROCEDURE — 99214 PR OFFICE/OUTPT VISIT, EST, LEVL IV, 30-39 MIN: ICD-10-PCS | Mod: S$PBB,,, | Performed by: INTERNAL MEDICINE

## 2023-01-17 PROCEDURE — 3008F PR BODY MASS INDEX (BMI) DOCUMENTED: ICD-10-PCS | Mod: CPTII,,, | Performed by: INTERNAL MEDICINE

## 2023-01-17 PROCEDURE — 3074F PR MOST RECENT SYSTOLIC BLOOD PRESSURE < 130 MM HG: ICD-10-PCS | Mod: CPTII,,, | Performed by: INTERNAL MEDICINE

## 2023-01-17 PROCEDURE — 99214 OFFICE O/P EST MOD 30 MIN: CPT | Mod: S$PBB,,, | Performed by: INTERNAL MEDICINE

## 2023-01-17 PROCEDURE — 1160F RVW MEDS BY RX/DR IN RCRD: CPT | Mod: CPTII,,, | Performed by: INTERNAL MEDICINE

## 2023-01-17 PROCEDURE — 3074F SYST BP LT 130 MM HG: CPT | Mod: CPTII,,, | Performed by: INTERNAL MEDICINE

## 2023-01-17 PROCEDURE — 1160F PR REVIEW ALL MEDS BY PRESCRIBER/CLIN PHARMACIST DOCUMENTED: ICD-10-PCS | Mod: CPTII,,, | Performed by: INTERNAL MEDICINE

## 2023-01-17 PROCEDURE — 4010F ACE/ARB THERAPY RXD/TAKEN: CPT | Mod: CPTII,,, | Performed by: INTERNAL MEDICINE

## 2023-01-17 PROCEDURE — 1159F MED LIST DOCD IN RCRD: CPT | Mod: CPTII,,, | Performed by: INTERNAL MEDICINE

## 2023-01-17 PROCEDURE — 80048 BASIC METABOLIC PNL TOTAL CA: CPT | Performed by: INTERNAL MEDICINE

## 2023-01-17 PROCEDURE — 99999 PR PBB SHADOW E&M-EST. PATIENT-LVL IV: CPT | Mod: PBBFAC,,, | Performed by: INTERNAL MEDICINE

## 2023-01-17 PROCEDURE — 36415 COLL VENOUS BLD VENIPUNCTURE: CPT | Performed by: INTERNAL MEDICINE

## 2023-01-17 PROCEDURE — 1159F PR MEDICATION LIST DOCUMENTED IN MEDICAL RECORD: ICD-10-PCS | Mod: CPTII,,, | Performed by: INTERNAL MEDICINE

## 2023-01-17 PROCEDURE — 99999 PR PBB SHADOW E&M-EST. PATIENT-LVL IV: ICD-10-PCS | Mod: PBBFAC,,, | Performed by: INTERNAL MEDICINE

## 2023-01-17 PROCEDURE — 3078F DIAST BP <80 MM HG: CPT | Mod: CPTII,,, | Performed by: INTERNAL MEDICINE

## 2023-01-17 PROCEDURE — 3078F PR MOST RECENT DIASTOLIC BLOOD PRESSURE < 80 MM HG: ICD-10-PCS | Mod: CPTII,,, | Performed by: INTERNAL MEDICINE

## 2023-01-17 RX ORDER — CLOPIDOGREL BISULFATE 75 MG/1
75 TABLET ORAL
COMMUNITY
Start: 2022-12-27

## 2023-01-17 RX ORDER — ATORVASTATIN CALCIUM 80 MG/1
80 TABLET, FILM COATED ORAL NIGHTLY
Qty: 90 TABLET | Refills: 3 | Status: SHIPPED | OUTPATIENT
Start: 2023-01-17 | End: 2023-07-26 | Stop reason: ALTCHOICE

## 2023-01-17 NOTE — PATIENT INSTRUCTIONS
Tell your Mom my surgeon was Dr. Rylan Reilly--Neurosurgeon here in [place] and I recommend him strongly

## 2023-01-17 NOTE — PROGRESS NOTES
Subjective:     Patient ID:  Juan Stephens is a 37 y.o. male who presents for follow-up of Congestive Heart Failure    HPI:  36 yo WM is the son of one of our patients who had a heart transplant and subsequently  of metastatic cancer in 2019.  He reports that in 2018 he suffered a massive myocardial infarction and angiography demonstrated totally occluded right coronary artery.  The coronary stent, Synergy 3.5 x 12 drug-eluting stent was placed.  He had no warning prior to that heart attack and onset of chest pain occurred while showering.  He did well for approximately 2 weeks and then presented back to the hospital with acute congestive heart failure.  Prior to the 2018 hospitalization he was drinking half case of beer per week and also smoking 1 per day.  He quit both of these activities 2019.    He wanted to be seen for his heart failure came to [place] March 15, 2019 and while driving developed recurrent chest pain.  He went to the [facility] emergency room and was subsequently taken to the cardiac catheterization laboratory.  An ulcerated 90% narrowing was noted in the proximal 1st diagonal.  He underwent placement of drug-eluting stents 2.5x 22 and 2.5 x 8 placed in that region overlapping one another with no residual stenosis.  There was a 40% of the 3rd marginal branch.  Right coronary stent previously placed in 2018 at an outside hospital was widely patent.  His left ventricular end-diastolic pressure was normal at 7 mm Hg.  An echocardiogram demonstrated estimated ejection fraction 45% and grade 1 diastolic dysfunction.  Wall motion was described as a globally hypokinetic.    He saw me 2019 and I recommended the following:  Change to aspirin 81 mg qd--bolivar not ecotrin  Change carvedilol to metoprolol succinate 50 mg once daily  They will gradually go up at home to 200 mg a day  Other recommendations for next visit with HF clinic at home would be to do the  following:   Increase entresto to  mg twice a day   Reduce bumetanide to 3 days/week   Consider replacing potassium with spironolactone 25 mg once a day    He saw Dr. Hilton May 2019 but no changes made in regimen.  I was contacted Sept 2019 re: refill.  After reviewing the clinic note and VS from May 2019, I increased metoprolol succinate to 100 mg qd.  At visit 1 month later on Oct 24, 2019 I increased Entresto to  mg BID and 2 weeks later increased metoprolol succinate to 200 mg qd.  At visits in June and Dec 2020 he was doing well no changes made.  When seen January 2022 asked for sleep consult re: sleep apnea and stopped Effient.    At visit July 2022 I added Farxiga 10 mg qd in view of CHF and may help with weight loss    He comes today 1/17/2023 for f/u visit.    He has been doing well without angina.  No dyspnea on exertion, orthopnea or PND.  Still having fatigue.    Review of Systems   Constitutional: Positive for malaise/fatigue (much improved fatigue on treatment for KALIA) and weight loss. Negative for chills, fever, night sweats and weight gain.   Cardiovascular:  Negative for chest pain, dyspnea on exertion, irregular heartbeat, leg swelling, near-syncope, orthopnea, palpitations, paroxysmal nocturnal dyspnea and syncope.   Respiratory:  Positive for sleep disturbances due to breathing (now on treatment for KALIA). Negative for cough, sputum production and wheezing.    Hematologic/Lymphatic: Does not bruise/bleed easily.   Musculoskeletal:  Negative for arthritis, joint pain and stiffness.   Gastrointestinal:  Negative for heartburn (GERD controlled), hematochezia and melena.        Still suffers with GE reflux   Genitourinary:  Negative for hematuria.   Neurological:  Negative for brief paralysis, focal weakness, seizures and weakness.   Psychiatric/Behavioral:          Bad dreams have improved on treatment   Objective:   Physical Exam  Constitutional:       General: He is not in acute  "distress.     Appearance: He is well-developed. He is obese. He is not diaphoretic.      Comments: /77 (BP Location: Right arm, Patient Position: Sitting, BP Method: Medium (Automatic))   Pulse 77   Ht 5' 10" (1.778 m)   Wt 102.1 kg (225 lb 1.4 oz)   BMI 32.30 kg/m²   Last 2 visits wt 230 and 234#  Obese white male in no acute distress.  Friendly, cooperative     HENT:      Head: Normocephalic and atraumatic.   Eyes:      General: No scleral icterus.        Right eye: No discharge.         Left eye: No discharge.      Conjunctiva/sclera: Conjunctivae normal.   Neck:      Thyroid: No thyromegaly.      Vascular: No JVD.   Cardiovascular:      Rate and Rhythm: Normal rate and regular rhythm.      Heart sounds: Normal heart sounds. No murmur heard.    No gallop.   Pulmonary:      Effort: Pulmonary effort is normal.      Breath sounds: Normal breath sounds.   Abdominal:      General: Bowel sounds are normal. There is no distension.      Palpations: Abdomen is soft. There is no mass.      Tenderness: There is no abdominal tenderness. There is no guarding or rebound.   Musculoskeletal:         General: No swelling or tenderness.      Right lower leg: No edema.      Left lower leg: No edema.   Skin:     General: Skin is warm and dry.   Neurological:      General: No focal deficit present.      Mental Status: Mental status is at baseline.   Psychiatric:         Mood and Affect: Mood normal.         Behavior: Behavior normal.         Thought Content: Thought content normal.         Judgment: Judgment normal.     Lab Results   Component Value Date    BNP 46 01/07/2022     01/17/2023    K 3.9 01/17/2023    MG 2.1 10/24/2019     01/17/2023    CO2 22 (L) 01/17/2023    BUN 15 01/17/2023    CREATININE 1.1 01/17/2023     01/17/2023    HGBA1C 5.1 03/15/2019    AST 25 07/07/2022    ALT 43 07/07/2022    ALBUMIN 4.1 07/07/2022    PROT 7.5 07/07/2022    BILITOT 0.8 07/07/2022    WBC 4.69 12/15/2020    HGB " 15.1 12/15/2020    HCT 45.1 12/15/2020     (L) 12/15/2020    CHOL 139 07/07/2022    HDL 33 (L) 07/07/2022    LDLCALC 65.0 07/07/2022    TRIG 205 (H) 07/07/2022     His lipids and complete metabolic profile performed 12/15/2020 total cholesterol 116, HDL 29, LDL 41, triglyceride 231, LFTs normal, glucose normal, electrolytes and renal function normal, BNP 36     7/7/2022 ECHO  The left ventricle is normal in size with mildly decreased systolic function. The estimated ejection fraction is 45%.  The quantitatively derived ejection fraction is 43%.  There are segmental left ventricular wall motion abnormalities.  Normal right ventricular size with normal right ventricular systolic function.  Grade I left ventricular diastolic dysfunction.  The estimated PA systolic pressure is 25 mmHg.  Normal central venous pressure (3 mmHg).      12/15/2020 echocardiogram Summary  The left ventricle is mildly enlarged with mildly decreased systolic function. The estimated ejection fraction is 40%.  Normal right ventricular size with low normal right ventricular systolic function.  Grade I left ventricular diastolic dysfunction.  The estimated PA systolic pressure is 24 mmHg.  Normal central venous pressure (3 mmHg).      Assessment:     1. Chronic combined systolic and diastolic congestive heart failure    2. Coronary artery disease involving native coronary artery of native heart without angina pectoris    3. Essential hypertension    4. Ischemic cardiomyopathy    5. Low serum HDL    6. Inferior MI    7. KALIA (obstructive sleep apnea)      Plan:   Same treatment and continue gradual weight loss  With new guidelines discussed driving LDL < 50 and he would like to try so increase lipitor to 80 mg  hs  RTC 6 months with lipids, CMP and BNP

## 2023-04-26 ENCOUNTER — PATIENT MESSAGE (OUTPATIENT)
Dept: PULMONOLOGY | Facility: CLINIC | Age: 38
End: 2023-04-26
Payer: MEDICAID

## 2023-05-23 DIAGNOSIS — I50.42 CHRONIC COMBINED SYSTOLIC AND DIASTOLIC CONGESTIVE HEART FAILURE: Primary | ICD-10-CM

## 2023-05-28 DIAGNOSIS — I50.42 CHRONIC COMBINED SYSTOLIC AND DIASTOLIC CONGESTIVE HEART FAILURE: ICD-10-CM

## 2023-05-30 RX ORDER — DAPAGLIFLOZIN 10 MG/1
TABLET, FILM COATED ORAL
Qty: 30 TABLET | Refills: 5 | Status: SHIPPED | OUTPATIENT
Start: 2023-05-30 | End: 2023-11-13

## 2023-06-21 NOTE — NURSING
Autumn Holland called requesting a refill of the below medication which has been pended for you:     Requested Prescriptions     Pending Prescriptions Disp Refills    omeprazole (PRILOSEC) 20 MG delayed release capsule 180 capsule 1     Sig: Take 1 capsule by mouth 2 times daily       Last Appointment Date: 6/7/2023  Next Appointment Date: 12/7/2023    Allergies   Allergen Reactions    Aspirin Other (See Comments)     Abdominal pain    Ibuprofen Other (See Comments)     Abdominal pain    Penicillin G Other (See Comments)    Sulfamethoxazole-Trimethoprim Nausea Only and Other (See Comments)    Penicillins Rash VASC Bands removed. Right radial area  at 2330, no bleeding, s/s of complications noted as of now. PIV removed to left forearm, pressure bandage in place, no bleeding, s/s of complications noted. Discharge instructions given, keep bandages in place x 24 hrs. Discharge medications reviewed, patient verbalized understanding. Post procedure instructions reviewed, patient verbalized understanding. Discharge to home complete.

## 2023-07-26 ENCOUNTER — OFFICE VISIT (OUTPATIENT)
Dept: TRANSPLANT | Facility: CLINIC | Age: 38
End: 2023-07-26
Attending: INTERNAL MEDICINE
Payer: MEDICAID

## 2023-07-26 ENCOUNTER — LAB VISIT (OUTPATIENT)
Dept: LAB | Facility: HOSPITAL | Age: 38
End: 2023-07-26
Attending: INTERNAL MEDICINE
Payer: MEDICAID

## 2023-07-26 VITALS
HEIGHT: 70 IN | WEIGHT: 223.75 LBS | BODY MASS INDEX: 32.03 KG/M2 | DIASTOLIC BLOOD PRESSURE: 63 MMHG | SYSTOLIC BLOOD PRESSURE: 108 MMHG | HEART RATE: 76 BPM

## 2023-07-26 DIAGNOSIS — Z95.5 STATUS POST CORONARY ARTERY STENT PLACEMENT: ICD-10-CM

## 2023-07-26 DIAGNOSIS — E66.09 CLASS 1 OBESITY DUE TO EXCESS CALORIES WITH SERIOUS COMORBIDITY AND BODY MASS INDEX (BMI) OF 32.0 TO 32.9 IN ADULT: ICD-10-CM

## 2023-07-26 DIAGNOSIS — I10 ESSENTIAL HYPERTENSION: ICD-10-CM

## 2023-07-26 DIAGNOSIS — I50.42 CHRONIC COMBINED SYSTOLIC AND DIASTOLIC CONGESTIVE HEART FAILURE: ICD-10-CM

## 2023-07-26 DIAGNOSIS — I25.5 ISCHEMIC CARDIOMYOPATHY: ICD-10-CM

## 2023-07-26 DIAGNOSIS — I21.19 INFERIOR MI: ICD-10-CM

## 2023-07-26 DIAGNOSIS — I25.10 CORONARY ARTERY DISEASE INVOLVING NATIVE CORONARY ARTERY OF NATIVE HEART WITHOUT ANGINA PECTORIS: ICD-10-CM

## 2023-07-26 DIAGNOSIS — K21.9 GASTROESOPHAGEAL REFLUX DISEASE WITHOUT ESOPHAGITIS: ICD-10-CM

## 2023-07-26 DIAGNOSIS — R74.8 LOW SERUM HDL: ICD-10-CM

## 2023-07-26 DIAGNOSIS — G47.33 OSA (OBSTRUCTIVE SLEEP APNEA): ICD-10-CM

## 2023-07-26 DIAGNOSIS — I50.42 CHRONIC COMBINED SYSTOLIC AND DIASTOLIC CONGESTIVE HEART FAILURE: Primary | ICD-10-CM

## 2023-07-26 DIAGNOSIS — R06.09 DYSPNEA ON EXERTION: ICD-10-CM

## 2023-07-26 LAB
ALBUMIN SERPL BCP-MCNC: 4 G/DL (ref 3.5–5.2)
ALP SERPL-CCNC: 95 U/L (ref 55–135)
ALT SERPL W/O P-5'-P-CCNC: 32 U/L (ref 10–44)
ANION GAP SERPL CALC-SCNC: 11 MMOL/L (ref 8–16)
AST SERPL-CCNC: 20 U/L (ref 10–40)
BASOPHILS # BLD AUTO: 0.06 K/UL (ref 0–0.2)
BASOPHILS NFR BLD: 1 % (ref 0–1.9)
BILIRUB SERPL-MCNC: 0.6 MG/DL (ref 0.1–1)
BUN SERPL-MCNC: 15 MG/DL (ref 6–20)
CALCIUM SERPL-MCNC: 9.6 MG/DL (ref 8.7–10.5)
CHLORIDE SERPL-SCNC: 104 MMOL/L (ref 95–110)
CHOLEST SERPL-MCNC: 160 MG/DL (ref 120–199)
CHOLEST/HDLC SERPL: 4.7 {RATIO} (ref 2–5)
CO2 SERPL-SCNC: 25 MMOL/L (ref 23–29)
CREAT SERPL-MCNC: 0.9 MG/DL (ref 0.5–1.4)
DIFFERENTIAL METHOD: NORMAL
EOSINOPHIL # BLD AUTO: 0.4 K/UL (ref 0–0.5)
EOSINOPHIL NFR BLD: 5.6 % (ref 0–8)
ERYTHROCYTE [DISTWIDTH] IN BLOOD BY AUTOMATED COUNT: 13.2 % (ref 11.5–14.5)
EST. GFR  (NO RACE VARIABLE): >60 ML/MIN/1.73 M^2
GLUCOSE SERPL-MCNC: 104 MG/DL (ref 70–110)
HCT VFR BLD AUTO: 49.9 % (ref 40–54)
HDLC SERPL-MCNC: 34 MG/DL (ref 40–75)
HDLC SERPL: 21.3 % (ref 20–50)
HGB BLD-MCNC: 17.1 G/DL (ref 14–18)
IMM GRANULOCYTES # BLD AUTO: 0.01 K/UL (ref 0–0.04)
IMM GRANULOCYTES NFR BLD AUTO: 0.2 % (ref 0–0.5)
LDLC SERPL CALC-MCNC: 52.8 MG/DL (ref 63–159)
LYMPHOCYTES # BLD AUTO: 2.1 K/UL (ref 1–4.8)
LYMPHOCYTES NFR BLD: 33 % (ref 18–48)
MCH RBC QN AUTO: 30.3 PG (ref 27–31)
MCHC RBC AUTO-ENTMCNC: 34.3 G/DL (ref 32–36)
MCV RBC AUTO: 89 FL (ref 82–98)
MONOCYTES # BLD AUTO: 0.5 K/UL (ref 0.3–1)
MONOCYTES NFR BLD: 8.5 % (ref 4–15)
NEUTROPHILS # BLD AUTO: 3.2 K/UL (ref 1.8–7.7)
NEUTROPHILS NFR BLD: 51.7 % (ref 38–73)
NONHDLC SERPL-MCNC: 126 MG/DL
NRBC BLD-RTO: 0 /100 WBC
PLATELET # BLD AUTO: 153 K/UL (ref 150–450)
PMV BLD AUTO: 10.3 FL (ref 9.2–12.9)
POTASSIUM SERPL-SCNC: 4.5 MMOL/L (ref 3.5–5.1)
PROT SERPL-MCNC: 7.3 G/DL (ref 6–8.4)
RBC # BLD AUTO: 5.64 M/UL (ref 4.6–6.2)
SODIUM SERPL-SCNC: 140 MMOL/L (ref 136–145)
TRIGL SERPL-MCNC: 366 MG/DL (ref 30–150)
WBC # BLD AUTO: 6.22 K/UL (ref 3.9–12.7)

## 2023-07-26 PROCEDURE — 1160F RVW MEDS BY RX/DR IN RCRD: CPT | Mod: CPTII,,, | Performed by: INTERNAL MEDICINE

## 2023-07-26 PROCEDURE — 80053 COMPREHEN METABOLIC PANEL: CPT | Performed by: INTERNAL MEDICINE

## 2023-07-26 PROCEDURE — 4010F PR ACE/ARB THEARPY RXD/TAKEN: ICD-10-PCS | Mod: CPTII,,, | Performed by: INTERNAL MEDICINE

## 2023-07-26 PROCEDURE — 99214 PR OFFICE/OUTPT VISIT, EST, LEVL IV, 30-39 MIN: ICD-10-PCS | Mod: S$PBB,,, | Performed by: INTERNAL MEDICINE

## 2023-07-26 PROCEDURE — 99214 OFFICE O/P EST MOD 30 MIN: CPT | Mod: S$PBB,,, | Performed by: INTERNAL MEDICINE

## 2023-07-26 PROCEDURE — 99999 PR PBB SHADOW E&M-EST. PATIENT-LVL III: ICD-10-PCS | Mod: PBBFAC,,, | Performed by: INTERNAL MEDICINE

## 2023-07-26 PROCEDURE — 85025 COMPLETE CBC W/AUTO DIFF WBC: CPT | Performed by: INTERNAL MEDICINE

## 2023-07-26 PROCEDURE — 80061 LIPID PANEL: CPT | Performed by: INTERNAL MEDICINE

## 2023-07-26 PROCEDURE — 3078F DIAST BP <80 MM HG: CPT | Mod: CPTII,,, | Performed by: INTERNAL MEDICINE

## 2023-07-26 PROCEDURE — 3008F PR BODY MASS INDEX (BMI) DOCUMENTED: ICD-10-PCS | Mod: CPTII,,, | Performed by: INTERNAL MEDICINE

## 2023-07-26 PROCEDURE — 1160F PR REVIEW ALL MEDS BY PRESCRIBER/CLIN PHARMACIST DOCUMENTED: ICD-10-PCS | Mod: CPTII,,, | Performed by: INTERNAL MEDICINE

## 2023-07-26 PROCEDURE — 1159F MED LIST DOCD IN RCRD: CPT | Mod: CPTII,,, | Performed by: INTERNAL MEDICINE

## 2023-07-26 PROCEDURE — 3078F PR MOST RECENT DIASTOLIC BLOOD PRESSURE < 80 MM HG: ICD-10-PCS | Mod: CPTII,,, | Performed by: INTERNAL MEDICINE

## 2023-07-26 PROCEDURE — 3074F PR MOST RECENT SYSTOLIC BLOOD PRESSURE < 130 MM HG: ICD-10-PCS | Mod: CPTII,,, | Performed by: INTERNAL MEDICINE

## 2023-07-26 PROCEDURE — 1159F PR MEDICATION LIST DOCUMENTED IN MEDICAL RECORD: ICD-10-PCS | Mod: CPTII,,, | Performed by: INTERNAL MEDICINE

## 2023-07-26 PROCEDURE — 99213 OFFICE O/P EST LOW 20 MIN: CPT | Mod: PBBFAC | Performed by: INTERNAL MEDICINE

## 2023-07-26 PROCEDURE — 36415 COLL VENOUS BLD VENIPUNCTURE: CPT | Performed by: INTERNAL MEDICINE

## 2023-07-26 PROCEDURE — 3008F BODY MASS INDEX DOCD: CPT | Mod: CPTII,,, | Performed by: INTERNAL MEDICINE

## 2023-07-26 PROCEDURE — 99999 PR PBB SHADOW E&M-EST. PATIENT-LVL III: CPT | Mod: PBBFAC,,, | Performed by: INTERNAL MEDICINE

## 2023-07-26 PROCEDURE — 3074F SYST BP LT 130 MM HG: CPT | Mod: CPTII,,, | Performed by: INTERNAL MEDICINE

## 2023-07-26 PROCEDURE — 4010F ACE/ARB THERAPY RXD/TAKEN: CPT | Mod: CPTII,,, | Performed by: INTERNAL MEDICINE

## 2023-07-26 RX ORDER — METOCLOPRAMIDE 10 MG/1
10 TABLET ORAL 4 TIMES DAILY
COMMUNITY
Start: 2023-07-10 | End: 2023-12-11

## 2023-07-26 RX ORDER — ROSUVASTATIN CALCIUM 40 MG/1
40 TABLET, COATED ORAL NIGHTLY
Qty: 90 TABLET | Refills: 3 | Status: SHIPPED | OUTPATIENT
Start: 2023-07-26 | End: 2024-07-25

## 2023-07-26 NOTE — Clinical Note
See me 6 months early morning appt Get PET stress in 6 months with visit Lipids, BNP and CMP in 6 months

## 2023-07-27 NOTE — PROGRESS NOTES
Subjective:     Patient ID:  Juan Stephens is a 37 y.o. male who presents for follow-up of Congestive Heart Failure    HPI:  36 yo WM is the son of one of our patients who had a heart transplant and subsequently  of metastatic cancer in 2019.  He reports that in 2018 he suffered a massive myocardial infarction and angiography demonstrated totally occluded right coronary artery.  The coronary stent, Synergy 3.5 x 12 drug-eluting stent was placed.  He had no warning prior to that heart attack and onset of chest pain occurred while showering.  He did well for approximately 2 weeks and then presented back to the hospital with acute congestive heart failure.  Prior to the 2018 hospitalization he was drinking half case of beer per week and also smoking 1 per day.  He quit both of these activities 2019.    He wanted to be seen for his heart failure came to [place] March 15, 2019 and while driving developed recurrent chest pain.  He went to the [facility] emergency room and was subsequently taken to the cardiac catheterization laboratory.  An ulcerated 90% narrowing was noted in the proximal 1st diagonal.  He underwent placement of drug-eluting stents 2.5x 22 and 2.5 x 8 placed in that region overlapping one another with no residual stenosis.  There was a 40% of the 3rd marginal branch.  Right coronary stent previously placed in 2018 at an outside hospital was widely patent.  His left ventricular end-diastolic pressure was normal at 7 mm Hg.  An echocardiogram demonstrated estimated ejection fraction 45% and grade 1 diastolic dysfunction.  Wall motion was described as a globally hypokinetic.    He saw me 2019 and I recommended the following:  Change to aspirin 81 mg qd--bolivar not ecotrin  Change carvedilol to metoprolol succinate 50 mg once daily  They will gradually go up at home to 200 mg a day  Other recommendations for next visit with HF clinic at home would be to do the  following:   Increase entresto to  mg twice a day   Reduce bumetanide to 3 days/week   Consider replacing potassium with spironolactone 25 mg once a day    He saw Dr. Hilton May 2019 but no changes made in regimen.  I was contacted Sept 2019 re: refill.  After reviewing the clinic note and VS from May 2019, I increased metoprolol succinate to 100 mg qd.  At visit 1 month later on Oct 24, 2019 I increased Entresto to  mg BID and 2 weeks later increased metoprolol succinate to 200 mg qd.  At visits in June and Dec 2020 he was doing well no changes made.  When seen January 2022 asked for sleep consult re: sleep apnea and stopped Effient.    At visit July 2022 I added Farxiga 10 mg qd in view of CHF and may help with weight loss    At visit 1/17/2023 he was doing well but elected to increase atorvastatin 80 mg nightly target LDL less than 50.      He comes today July 06/20/2023 He has been doing well without angina.  Today he reports some dyspnea on exertion (minimal) after walking approximately 400 yd.  No orthopnea or PND.  No palpitations presyncope or syncope.    Review of Systems   Constitutional: Positive for malaise/fatigue and weight loss. Negative for chills, fever, night sweats and weight gain.   Cardiovascular:  Negative for chest pain, dyspnea on exertion, irregular heartbeat, leg swelling, near-syncope, orthopnea, palpitations, paroxysmal nocturnal dyspnea and syncope.   Respiratory:  Positive for sleep disturbances due to breathing (He uses his CPAP). Negative for cough, sputum production and wheezing.    Hematologic/Lymphatic: Does not bruise/bleed easily.   Musculoskeletal:  Positive for back pain. Negative for arthritis, joint pain and stiffness.   Gastrointestinal:  Positive for heartburn (GERD been difficult worse lately I have encouraged him to seek referral to GI primary care since it is no longer responsive to PPI). Negative for hematochezia and melena.        Still suffers with GE  "reflux   Genitourinary:  Negative for hematuria.   Neurological:  Negative for brief paralysis, excessive daytime sleepiness (Sleep apnea treatment has worked wonders), dizziness, focal weakness, light-headedness, seizures and weakness.   Objective:   Physical Exam  Constitutional:       General: He is not in acute distress.     Appearance: He is well-developed. He is obese. He is not diaphoretic.      Comments: /63 (BP Location: Left arm, Patient Position: Sitting, BP Method: Large (Automatic))   Pulse 76   Ht 5' 10" (1.778 m)   Wt 101.5 kg (223 lb 12.3 oz)   BMI 32.11 kg/m²   Visit weight 225#  Obese white male in no acute distress.  Friendly, cooperative     HENT:      Head: Normocephalic and atraumatic.   Eyes:      General: No scleral icterus.        Right eye: No discharge.         Left eye: No discharge.      Conjunctiva/sclera: Conjunctivae normal.   Neck:      Thyroid: No thyromegaly.      Vascular: No JVD.   Cardiovascular:      Rate and Rhythm: Normal rate and regular rhythm.      Heart sounds: Normal heart sounds. No murmur heard.    No gallop.   Pulmonary:      Effort: Pulmonary effort is normal.      Breath sounds: Normal breath sounds.   Abdominal:      General: Bowel sounds are normal. There is no distension.      Palpations: Abdomen is soft. There is no mass.      Tenderness: There is no abdominal tenderness. There is no guarding or rebound.   Musculoskeletal:         General: No swelling or tenderness.      Right lower leg: No edema.      Left lower leg: No edema.   Skin:     General: Skin is warm and dry.   Neurological:      General: No focal deficit present.      Mental Status: He is oriented to person, place, and time. Mental status is at baseline.   Psychiatric:         Mood and Affect: Mood normal.         Behavior: Behavior normal.         Thought Content: Thought content normal.         Judgment: Judgment normal.     Lab Results   Component Value Date    BNP 46 01/07/2022    NA " 140 07/26/2023    K 4.5 07/26/2023    MG 2.1 10/24/2019     07/26/2023    CO2 25 07/26/2023    BUN 15 07/26/2023    CREATININE 0.9 07/26/2023     07/26/2023    HGBA1C 5.1 03/15/2019    AST 20 07/26/2023    ALT 32 07/26/2023    ALBUMIN 4.0 07/26/2023    PROT 7.3 07/26/2023    BILITOT 0.6 07/26/2023    WBC 6.22 07/26/2023    HGB 17.1 07/26/2023    HCT 49.9 07/26/2023     07/26/2023    CHOL 160 07/26/2023    HDL 34 (L) 07/26/2023    LDLCALC 52.8 (L) 07/26/2023    TRIG 366 (H) 07/26/2023 7/7/2022 ECHO  The left ventricle is normal in size with mildly decreased systolic function. The estimated ejection fraction is 45%.  The quantitatively derived ejection fraction is 43%.  There are segmental left ventricular wall motion abnormalities.  Normal right ventricular size with normal right ventricular systolic function.  Grade I left ventricular diastolic dysfunction.  The estimated PA systolic pressure is 25 mmHg.  Normal central venous pressure (3 mmHg).      12/15/2020 echocardiogram  The left ventricle is mildly enlarged with mildly decreased systolic function. The estimated ejection fraction is 40%.  Normal right ventricular size with low normal right ventricular systolic function.  Grade I left ventricular diastolic dysfunction.  The estimated PA systolic pressure is 24 mmHg.  Normal central venous pressure (3 mmHg).      Assessment:     1. Chronic combined systolic and diastolic congestive heart failure    2. Ischemic cardiomyopathy    3. Coronary artery disease involving native coronary artery of native heart without angina pectoris    4. Essential hypertension    5. Low serum HDL    6. Inferior MI    7. KALIA (obstructive sleep apnea)    8. Gastroesophageal reflux disease without esophagitis      Plan:   Same treatment and continue gradual weight loss  I discussed with him the low HDL and measures that help exercise, weight loss do some sugar/carbohydrate intake.  Tells me his weight has been  stable number of years and that he does not need bread, pasta, sweets.  We discussed driving LDL < 50 especially with his low HDL he is agreeable with this plan.  To change to rosuvastatin 40 mg nightly from lipitor to 80 mg hs my experience rosuvastatin sometimes notes increased HTS suction and triglyceride compared to atorvastatin.  There is no great data treatment of triglyceride to reduce cardiovascular risk and considering the risk of adding a fibrate high-dose statin would not recommend this approach.  I would like for him to undergo a PET stress study since he is reporting by shortness of breath that he is now reporting as he did not have warning prior to his 1st heart.  Rather than return for the PET at this time he will observe his symptoms and we will order a PET stress for his return visit in 6 months.  RTC 6 months with lipids, CMP and BNP

## 2023-08-28 ENCOUNTER — NURSE TRIAGE (OUTPATIENT)
Dept: ADMINISTRATIVE | Facility: CLINIC | Age: 38
End: 2023-08-28
Payer: MEDICAID

## 2023-08-28 ENCOUNTER — TELEPHONE (OUTPATIENT)
Dept: TRANSPLANT | Facility: CLINIC | Age: 38
End: 2023-08-28
Payer: MEDICAID

## 2023-08-28 NOTE — TELEPHONE ENCOUNTER
"1230 pm:  See Patient calls message from a little earlier today indicting pt went to have an endoscopy, but it was not performed due to some symptoms/tracings they say in regard to pts heart  I looked and does not seem this was at an [facility] facility  Last EKG in [facility] is 2019    Last visit w/ Dr. Cevallos was 7/2023    Per [facility], I do not see where pt sees General Cardiology or Arrhythmia cardiology    Placed call to pt at this time:  Wife spoke w / me  She said he has been having heart burn and today Went to local area to have a scope done, but would not do it and was told the reason was he had "abnormal heart rhythm and beats , and told him to go to the emergency room or his Cardiologist"    She said she asked him why he did not go to the emergency room, he did not want to  So she placed a call to his local Cardiologist, Dr. Alejandre in [place].   She said she had to leave a message then was told he was not in the office today, nor was anyone else who could see him    I told her since he is in [place] and a procedure was not performed because being told of a abnormal heart rhythm, he is unable to speak with his cardiologist there, then he should most definetly go to the Emergency room  I explained it could be a very serious rhythm which could be dangerous to his health    She agreed and said she wanted him to from the beginning    She will tell him and take him to a local ER    I will route to Dr. Cevallos for his awareness.       "

## 2023-08-28 NOTE — TELEPHONE ENCOUNTER
Transferred to me from scheduling. Pt wife calling on behalf of pt. [facility] had endoscopy scheduled this morning- when he was hooked up, pt had irregular heart rhythem- Was told they would not do procedure and that pt should either go to ED for eval or speak with cardiology. States she has left 2 messages for cardiology. States calling to try to get appt with cardiology. States currently pt feels fine. States pt had some palpitations last night, none today. Denies current racing heart, skipped beats, CP, or palpitations. States has had some SOB in past with palpitations but none presently. Care advice per protocol. Advised NT is not able to send high priority message to provider (Dr. Jt Alejandre) regarding pt- advised to call office back and if does not hear back in 30-45 min, consider bringing pt to ED for eval. Also advised to monitor for symptoms and to call back or seek immediate care if symptoms recur. Wife verbalized understanding.    Reason for Disposition   History of heart disease (i.e., heart attack, bypass surgery, angina, angioplasty)  (Exception: Brief heartbeat symptoms that went away and now feels well.)    Additional Information   Negative: Passed out (i.e., lost consciousness, collapsed and was not responding)   Negative: Shock suspected (e.g., cold/pale/clammy skin, too weak to stand, low BP, rapid pulse)   Negative: Difficult to awaken or acting confused (e.g., disoriented, slurred speech)   Negative: Visible sweat on face or sweat dripping down face   Negative: Unable to walk, or can only walk with assistance (e.g., requires support)   Negative: Received SHOCK from implantable cardiac defibrillator and has persisting symptoms (i.e., palpitations, lightheadedness)   Negative: Dizziness, lightheadedness, or weakness and heart beating very rapidly (e.g., > 140 / minute)   Negative: Dizziness, lightheadedness, or weakness and heart beating very slowly (e.g., < 50 / minute)   Negative: Sounds like a  life-threatening emergency to the triager   Negative: Difficulty breathing   Negative: Dizziness, lightheadedness, or weakness   Negative: Heart beating very rapidly (e.g., > 140 / minute) and present now  (Exception: During exercise.)   Negative: Heart beating very slowly (e.g., < 50 / minute)  (Exception: Athlete and heart rate normal for caller.)   Negative: New or worsened shortness of breath with activity (dyspnea on exertion)   Negative: Patient sounds very sick or weak to the triager   Negative: Wearing a 'Holter monitor' or 'cardiac event monitor'   Negative: Received SHOCK from implantable cardiac defibrillator (and now feels well)   Negative: Heart beating very rapidly (e.g., > 140 / minute) and not present now  (Exception: During exercise.)   Negative: Skipped or extra beat(s) and increases with exercise or exertion   Negative: Skipped or extra beat(s) and occurs 4 or more times per minute    Protocols used: Heart Rate and Heartbeat [id number]

## 2023-08-30 DIAGNOSIS — I50.42 CHRONIC COMBINED SYSTOLIC AND DIASTOLIC CONGESTIVE HEART FAILURE: ICD-10-CM

## 2023-08-30 DIAGNOSIS — I25.10 CORONARY ARTERY DISEASE INVOLVING NATIVE CORONARY ARTERY OF NATIVE HEART WITHOUT ANGINA PECTORIS: ICD-10-CM

## 2023-08-30 DIAGNOSIS — I21.19 INFERIOR MI: ICD-10-CM

## 2023-08-30 RX ORDER — METOPROLOL SUCCINATE 200 MG/1
200 TABLET, EXTENDED RELEASE ORAL DAILY
Qty: 30 TABLET | Refills: 5 | Status: SHIPPED | OUTPATIENT
Start: 2023-08-30 | End: 2024-02-19

## 2023-09-29 ENCOUNTER — TELEPHONE (OUTPATIENT)
Dept: TRANSPLANT | Facility: CLINIC | Age: 38
End: 2023-09-29
Payer: MEDICAID

## 2023-09-29 ENCOUNTER — PATIENT MESSAGE (OUTPATIENT)
Dept: TRANSPLANT | Facility: CLINIC | Age: 38
End: 2023-09-29
Payer: MEDICAID

## 2023-09-29 NOTE — TELEPHONE ENCOUNTER
2;35 PM: Returned call, NA, AYDEN for wife returning her call and that I had a conversation w/ her  Mr. Stephens earlier today, he was in the ER being evaluated for chest pain and he was going to see the outcome of that evaluation  I also informed her I had provided him with a contact phone number for an area here at [facility] that specializes with rhythm problems since he said he wants to be seen her for this.  I also stated this phone number for her as well as this dept phone number, day, date, time, my name if she should chose to call back.     ----- Message from Huyen Jordan sent at 9/29/2023  2:34 PM CDT -----  Regarding: F/u  Patient wife ( [place]) calling because he been having PVC and was told to have a er f/u/ Please call back @ [phone]. Thank you uHyen

## 2023-09-29 NOTE — TELEPHONE ENCOUNTER
"1020 am: Noted message below received from pt via [employer] messaging  See my last note 8/2023  Also I reviewed chart again and noted pt has not been seen by EP in the [facility] system  Pt was last seen in the dept 7/26/2023 by Dr. Cevallos.        Good morning, I've been having issues with heart rhythm and would like to be seen asap. I've been to [place] to see Jt brian they said they would send paperwork to yall. Not sure if yall got them. Thank you.     1030 am:  Placed call to pt at this time:  Pt told me he is actually in the ER locally ( [facility])  , went there last night because he has been having chest pain.   Asked pt if he would prefer I call back and he said he was ok and able to talk and wanted to do so  Pt said he would like to see Dr. Cevallos next week or so, because his heart rhythm hasnt been right  Asked and pt denies several HF symptoms such as: wt gain, swelling , sob.     Explained Dr. Cevallos's area of expertise being Heart Transplantation and Advanced heart failure, however if he is not currently seeing a cardiologist who manages his concern of abnormal heart rhythms, I certainly can assist with getting him set up to see this type of Cardiologist here at [facility]  Pt said locally they are looking into this, but he would prefer to come to [facility] Pt also said the reason for this is due to having insurance problems there and getting visits approved, "but @ [facility] yall seem to do it quickly "  Asked pt insurance he has and told me a medicaid type    Pt told me he would like to start seeing the specialist (EP) who deal with this sort of problem and he would contact them once out of the hospital and then he would also know at that time what they plan to do with his heart rhythm  Offered pt phone number , then offered to send it to pt via the portal, which of course would be better for pt at this time    Will respond to his original message via [employer] portal w/ the contact phone number " from EP    Additionally, I told pt , to my knowledge we have not yet received correspondance from his local Cardiologist Dr. Alejandre, however Dr. Cevallos is out of the office until mid October and may know more about this   I also told pt Dr. Cevallos has colleagues covering in his abscence, should he need to be seen for concerns related to his heart failure.

## 2023-11-12 DIAGNOSIS — I50.42 CHRONIC COMBINED SYSTOLIC AND DIASTOLIC CONGESTIVE HEART FAILURE: ICD-10-CM

## 2023-11-13 DIAGNOSIS — R74.8 LOW SERUM HDL: ICD-10-CM

## 2023-11-13 DIAGNOSIS — I50.42 CHRONIC COMBINED SYSTOLIC AND DIASTOLIC CONGESTIVE HEART FAILURE: Primary | ICD-10-CM

## 2023-11-13 RX ORDER — DAPAGLIFLOZIN 10 MG/1
TABLET, FILM COATED ORAL
Qty: 30 TABLET | Refills: 6 | Status: SHIPPED | OUTPATIENT
Start: 2023-11-13

## 2023-12-08 NOTE — PATIENT INSTRUCTIONS
Safe sleeping environment -- All patients with RBD and their bed partners should be counseled on ways to alter the sleeping environment to prevent injury. For patients with mild symptoms, this may be all that is needed.  Safety for both patients and bed partners is the paramount concern. Firearms should not be accessible, and sharp or easily breakable items (such as lamps) should be removed from the immediate sleeping area. In the event of continued vigorous behaviors, sleeping alone is advised. Many patients resort to using padded bed rails or sleeping in a sleeping bag [79].  Other novel strategies are in development. Exiting the bed while acting out a dream is a high-risk behavior that may result in traumatic injury [83]. A bed alarm that delivers a customized calming message at the onset of dream enactment can prevent a patient from exiting the bed and avert sleep-related injury [84].    
4 = No assist / stand by assistance

## 2023-12-11 ENCOUNTER — OFFICE VISIT (OUTPATIENT)
Dept: CARDIOLOGY | Facility: CLINIC | Age: 38
End: 2023-12-11
Payer: MEDICAID

## 2023-12-11 VITALS
HEART RATE: 68 BPM | WEIGHT: 221.13 LBS | HEIGHT: 70 IN | DIASTOLIC BLOOD PRESSURE: 60 MMHG | SYSTOLIC BLOOD PRESSURE: 102 MMHG | BODY MASS INDEX: 31.66 KG/M2 | OXYGEN SATURATION: 97 %

## 2023-12-11 DIAGNOSIS — I25.5 ISCHEMIC CARDIOMYOPATHY: ICD-10-CM

## 2023-12-11 DIAGNOSIS — I25.10 CORONARY ARTERY DISEASE INVOLVING NATIVE CORONARY ARTERY OF NATIVE HEART WITHOUT ANGINA PECTORIS: Primary | ICD-10-CM

## 2023-12-11 DIAGNOSIS — I10 ESSENTIAL HYPERTENSION: ICD-10-CM

## 2023-12-11 DIAGNOSIS — I50.42 CHRONIC COMBINED SYSTOLIC AND DIASTOLIC CONGESTIVE HEART FAILURE: ICD-10-CM

## 2023-12-11 DIAGNOSIS — R00.2 PALPITATIONS: ICD-10-CM

## 2023-12-11 DIAGNOSIS — I21.19 INFERIOR MI: ICD-10-CM

## 2023-12-11 DIAGNOSIS — K21.9 GASTROESOPHAGEAL REFLUX DISEASE WITHOUT ESOPHAGITIS: ICD-10-CM

## 2023-12-11 DIAGNOSIS — G47.33 OSA (OBSTRUCTIVE SLEEP APNEA): ICD-10-CM

## 2023-12-11 PROCEDURE — 99999 PR PBB SHADOW E&M-EST. PATIENT-LVL IV: CPT | Mod: PBBFAC,,,

## 2023-12-11 PROCEDURE — 4010F ACE/ARB THERAPY RXD/TAKEN: CPT | Mod: CPTII,,,

## 2023-12-11 PROCEDURE — 99214 OFFICE O/P EST MOD 30 MIN: CPT | Mod: PBBFAC,PN

## 2023-12-11 PROCEDURE — 99204 PR OFFICE/OUTPT VISIT, NEW, LEVL IV, 45-59 MIN: ICD-10-PCS | Mod: S$PBB,,,

## 2023-12-11 PROCEDURE — 3078F PR MOST RECENT DIASTOLIC BLOOD PRESSURE < 80 MM HG: ICD-10-PCS | Mod: CPTII,,,

## 2023-12-11 PROCEDURE — 3074F PR MOST RECENT SYSTOLIC BLOOD PRESSURE < 130 MM HG: ICD-10-PCS | Mod: CPTII,,,

## 2023-12-11 PROCEDURE — 3074F SYST BP LT 130 MM HG: CPT | Mod: CPTII,,,

## 2023-12-11 PROCEDURE — 1160F RVW MEDS BY RX/DR IN RCRD: CPT | Mod: CPTII,,,

## 2023-12-11 PROCEDURE — 1159F PR MEDICATION LIST DOCUMENTED IN MEDICAL RECORD: ICD-10-PCS | Mod: CPTII,,,

## 2023-12-11 PROCEDURE — 4010F PR ACE/ARB THEARPY RXD/TAKEN: ICD-10-PCS | Mod: CPTII,,,

## 2023-12-11 PROCEDURE — 99204 OFFICE O/P NEW MOD 45 MIN: CPT | Mod: S$PBB,,,

## 2023-12-11 PROCEDURE — 3008F PR BODY MASS INDEX (BMI) DOCUMENTED: ICD-10-PCS | Mod: CPTII,,,

## 2023-12-11 PROCEDURE — 99999 PR PBB SHADOW E&M-EST. PATIENT-LVL IV: ICD-10-PCS | Mod: PBBFAC,,,

## 2023-12-11 PROCEDURE — 3008F BODY MASS INDEX DOCD: CPT | Mod: CPTII,,,

## 2023-12-11 PROCEDURE — 93005 ELECTROCARDIOGRAM TRACING: CPT | Mod: PBBFAC,PN | Performed by: STUDENT IN AN ORGANIZED HEALTH CARE EDUCATION/TRAINING PROGRAM

## 2023-12-11 PROCEDURE — 3078F DIAST BP <80 MM HG: CPT | Mod: CPTII,,,

## 2023-12-11 PROCEDURE — 1159F MED LIST DOCD IN RCRD: CPT | Mod: CPTII,,,

## 2023-12-11 PROCEDURE — 1160F PR REVIEW ALL MEDS BY PRESCRIBER/CLIN PHARMACIST DOCUMENTED: ICD-10-PCS | Mod: CPTII,,,

## 2023-12-11 RX ORDER — RANOLAZINE 500 MG/1
500 TABLET, EXTENDED RELEASE ORAL 2 TIMES DAILY
COMMUNITY
Start: 2023-11-27 | End: 2024-01-24

## 2023-12-11 NOTE — PROGRESS NOTES
Subjective:    Patient ID:  Juan Stephens is a 38 y.o. male who presents for evaluation of No chief complaint on file.      PCP: Sanjuanita Smith MD     Referring Provider:     HPI: Patient is a 37 yo M w/PMH of Inferior MI ( 2018 synergy 3.5x 12 SARAHI - RCA [place], [place]),  CAD ( 3/15/2019 An ulcerated 90% narrowing was noted in the proximal 1st diagonal.  He underwent placement of drug-eluting stents 2.5x 22 and 2.5 x 8 placed in that region overlapping one another with no residual stenosis.  There was a 40% of the 3rd marginal branch.  Right coronary stent previously placed in December 2018 at an outside hospital was widely patent), ICM (2019 LVEF 45% w GIDD), combined CHF, HTN,GERD, HLD, KALIA,  who presents today to establish care and hospital follow. He is followed by HF clinic and was last seen by Dr. Cevallos on 7/26/23 and was continued on medical therapy.  He was seen in ED in [place], [place] .Patient reports he went for an EGD and procedure was cancelled as they stated he was having PVCs. Patient denies CP, presyncope, LOC, swelling, or claudication. He notes orthopnea and sleeps on incline( HOB elevated) also positive for PND. He also notes palpitations at night. He notes medication compliance without side effects, but doesn't take the bumex, states it causes him to dehydrate.     Past Medical History:   Diagnosis Date    CAD (coronary artery disease) dEC 2019    SYNERGY 3.5X12 RCA STENT WITH IMI; LATER MARCH 2019 D1 STENTED SARAHI 2.5X22 AND 2.5X8 OVERLAPPING    CHF (congestive heart failure)     GERD (gastroesophageal reflux disease)     Hypertension     Inferior MI 12/2018    stent synergy 3.5x12 RCA [place], [place]    Ischemic cardiomyopathy 11/4/2019    Low serum HDL 3/20/2019     Past Surgical History:   Procedure Laterality Date    CORONARY ANGIOPLASTY WITH STENT PLACEMENT      HERNIA REPAIR Left     INGUINAL    LEFT HEART CATHETERIZATION Left 3/18/2019    Procedure: Left heart cath;  Surgeon: Otilio  Johnson Woody MD;  Location: [facility] CATH LAB;  Service: Cardiology;  Laterality: Left;    OK LEFT HEART CATH,PERCUTANEOUS  2018    Cardiac Cath, Left Heart     Social History     Socioeconomic History    Marital status:    Tobacco Use    Smoking status: Former     Current packs/day: 0.00     Average packs/day: 1 pack/day for 15.0 years (15.0 ttl pk-yrs)     Types: Cigarettes     Start date: 2003     Quit date: 2018     Years since quittin.9    Smokeless tobacco: Current     Types: Snuff   Substance and Sexual Activity    Alcohol use: Yes     Comment: 0.5 CASES BEER UP TO 2019    Drug use: No    Sexual activity: Yes     Partners: Female     Family History   Problem Relation Age of Onset    Heart failure Father     Cancer Father     Heart disease Father     No Known Problems Mother     Hypertension Brother        Review of patient's allergies indicates:  No Known Allergies    Medication List with Changes/Refills   Current Medications    BUMETANIDE (BUMEX) 1 MG TABLET    as needed.    CETIRIZINE (ZYRTEC) 10 MG TABLET    Take 10 mg by mouth once daily.    CLOPIDOGREL (PLAVIX) 75 MG TABLET    Take 75 mg by mouth.    DEXILANT 30 MG CPDM    Take 30 mg by mouth once daily.    ENTRESTO  MG PER TABLET    TAKE ONE TABLET BY MOUTH TWICE DAILY    FARXIGA 10 MG TABLET    TAKE ONE TABLET BY MOUTH ONCE DAILY    MELATONIN (MELATIN) 3 MG TABLET    Take 6 mg by mouth nightly as needed for Insomnia.    METOCLOPRAMIDE HCL (REGLAN) 10 MG TABLET    Take 10 mg by mouth 4 (four) times daily.    METOPROLOL SUCCINATE (TOPROL-XL) 200 MG 24 HR TABLET    Take 1 tablet (200 mg total) by mouth once daily.    RANOLAZINE (RANEXA) 500 MG TB12    Take 500 mg by mouth 2 (two) times daily.    ROSUVASTATIN (CRESTOR) 40 MG TAB    Take 1 tablet (40 mg total) by mouth every evening.       Review of Systems   Constitutional: Positive for malaise/fatigue. Negative for diaphoresis and fever.   HENT:  Negative for  "congestion and hearing loss.    Eyes:  Negative for blurred vision and pain.   Cardiovascular:  Positive for dyspnea on exertion and palpitations. Negative for chest pain, claudication, leg swelling, near-syncope and syncope.   Respiratory:  Positive for shortness of breath and sleep disturbances due to breathing.         CPAP nightly   Hematologic/Lymphatic: Negative for bleeding problem. Does not bruise/bleed easily.   Skin:  Negative for color change and poor wound healing.   Gastrointestinal:  Negative for abdominal pain and nausea.   Genitourinary:  Negative for bladder incontinence and flank pain.   Neurological:  Negative for focal weakness and light-headedness.        Objective:   /60 (BP Location: Left arm, Patient Position: Sitting, BP Method: Large (Manual))   Pulse 68   Ht 5' 10" (1.778 m)   Wt 100.3 kg (221 lb 1.9 oz)   SpO2 97%   BMI 31.73 kg/m²    Physical Exam  Constitutional:       Appearance: He is well-developed. He is obese. He is not diaphoretic.   HENT:      Head: Normocephalic and atraumatic.   Eyes:      General: No scleral icterus.     Pupils: Pupils are equal, round, and reactive to light.   Neck:      Vascular: No JVD.   Cardiovascular:      Rate and Rhythm: Normal rate and regular rhythm.      Pulses: Intact distal pulses.           Radial pulses are 2+ on the right side and 2+ on the left side.        Dorsalis pedis pulses are 2+ on the right side and 2+ on the left side.        Posterior tibial pulses are 2+ on the right side and 2+ on the left side.      Heart sounds: S1 normal and S2 normal. No murmur heard.     No friction rub. No gallop.   Pulmonary:      Effort: Pulmonary effort is normal. No respiratory distress.      Breath sounds: Normal breath sounds. No wheezing or rales.   Chest:      Chest wall: No tenderness.   Abdominal:      General: Bowel sounds are normal. There is no distension.      Palpations: Abdomen is soft. There is no mass.      Tenderness: There is no " abdominal tenderness. There is no rebound.   Musculoskeletal:         General: No tenderness. Normal range of motion.      Cervical back: Normal range of motion and neck supple.   Skin:     General: Skin is warm and dry.      Coloration: Skin is not pale.   Neurological:      Mental Status: He is alert and oriented to person, place, and time.      Coordination: Coordination normal.      Deep Tendon Reflexes: Reflexes normal.   Psychiatric:         Behavior: Behavior normal.         Judgment: Judgment normal.           Assessment:       1. Coronary artery disease involving native coronary artery of native heart without angina pectoris    2. Chronic combined systolic and diastolic congestive heart failure    3. Inferior MI    4. Essential hypertension    5. Palpitations    6. Ischemic cardiomyopathy    7. KALIA (obstructive sleep apnea)    8. Gastroesophageal reflux disease without esophagitis         Plan:         Coronary artery disease involving native coronary artery of native heart without angina pectoris  -continue medication regimen : BB, Entresto (ARB)    Chronic combined systolic and diastolic congestive heart failure  Patient is identified as having Combined Systolic and Diastolic heart failure that is Chronic. CHF is currently controlled. Latest ECHO performed and demonstrates- Results for orders placed during the hospital encounter of 07/07/22    Echo    Interpretation Summary  · The left ventricle is normal in size with mildly decreased systolic function. The estimated ejection fraction is 45%.  · The quantitatively derived ejection fraction is 43%.  · There are segmental left ventricular wall motion abnormalities.  · Normal right ventricular size with normal right ventricular systolic function.  · Grade I left ventricular diastolic dysfunction.  · The estimated PA systolic pressure is 25 mmHg.  · Normal central venous pressure (3 mmHg).  . Continue Beta Blocker and ARNI and monitor clinical status closely.  Monitor on telemetry. Patient is on CHF pathway.  Monitor strict Is&Os and daily weights.  Place on fluid restriction of 2 L. Cardiology has been consulted. Continue to stress to patient importance of self efficacy and  on diet for CHF. Last BNP reviewed- and noted below @LABRCNTIP(BNP,BNPTRIAGEBLO)@    -continue medication regimen per Dr. Cevallos HF clinic  -Pet stress ordered for January 2024     Inferior MI  ( 2018 synergy 3.5x 12 SRAAHI - RCA [place], [place]),  CAD ( 3/15/2019 An ulcerated 90% narrowing was noted in the proximal 1st diagonal.  He underwent placement of drug-eluting stents 2.5x 22 and 2.5 x 8 placed in that region overlapping one another with no residual stenosis.   -continue clopidogrel     Essential hypertension  -Goal BP < 130/80  -controlled  -continue medication for HF management     Palpitations  -in office EKG   -Cardiac event to evaluate for arrhythmias   -refer to EP for PVCs     Ischemic cardiomyopathy  -See HF management     KALIA (obstructive sleep apnea)  CPAP nightly- compliant     GERD (gastroesophageal reflux disease)  -followed by PCP  -continue medical therapy       Total duration of face to face visit time 30 minutes.  Total time spent counseling greater than fifty percent of total visit time.  Counseling included discussion regarding imaging findings, diagnosis, possibilities, treatment options, risks and benefits.  The patient had many questions regarding the options and long-term effects      Hung Marte NP  Cardiology

## 2023-12-11 NOTE — ASSESSMENT & PLAN NOTE
Patient is identified as having Combined Systolic and Diastolic heart failure that is Chronic. CHF is currently controlled. Latest ECHO performed and demonstrates- Results for orders placed during the hospital encounter of 07/07/22    Echo    Interpretation Summary  · The left ventricle is normal in size with mildly decreased systolic function. The estimated ejection fraction is 45%.  · The quantitatively derived ejection fraction is 43%.  · There are segmental left ventricular wall motion abnormalities.  · Normal right ventricular size with normal right ventricular systolic function.  · Grade I left ventricular diastolic dysfunction.  · The estimated PA systolic pressure is 25 mmHg.  · Normal central venous pressure (3 mmHg).  . Continue Beta Blocker and ARNI and monitor clinical status closely. Monitor on telemetry. Patient is on CHF pathway.  Monitor strict Is&Os and daily weights.  Place on fluid restriction of 2 L. Cardiology has been consulted. Continue to stress to patient importance of self efficacy and  on diet for CHF. Last BNP reviewed- and noted below @LABRCNTIP(BNP,BNPTRIAGEBLO)@    -continue medication regimen per Dr. Cevallos HF clinic  -Pet stress ordered for January 2024

## 2023-12-11 NOTE — ASSESSMENT & PLAN NOTE
-in office EKG reviewed- SR w bigeminal PVCs   -Cardiac event to evaluate for arrhythmias   -refer to EP for PVCs

## 2023-12-11 NOTE — ASSESSMENT & PLAN NOTE
( 2018 synergy 3.5x 12 SARAHI - RCA [place], [place]),  CAD ( 3/15/2019 An ulcerated 90% narrowing was noted in the proximal 1st diagonal.  He underwent placement of drug-eluting stents 2.5x 22 and 2.5 x 8 placed in that region overlapping one another with no residual stenosis.   -continue clopidogrel

## 2023-12-18 ENCOUNTER — CLINICAL SUPPORT (OUTPATIENT)
Dept: CARDIOLOGY | Facility: HOSPITAL | Age: 38
End: 2023-12-18
Payer: MEDICAID

## 2023-12-18 DIAGNOSIS — R00.2 PALPITATIONS: ICD-10-CM

## 2023-12-18 DIAGNOSIS — I25.5 ISCHEMIC CARDIOMYOPATHY: Primary | ICD-10-CM

## 2023-12-19 ENCOUNTER — OFFICE VISIT (OUTPATIENT)
Dept: ELECTROPHYSIOLOGY | Facility: CLINIC | Age: 38
End: 2023-12-19
Payer: MEDICAID

## 2023-12-19 VITALS
HEIGHT: 70 IN | DIASTOLIC BLOOD PRESSURE: 58 MMHG | SYSTOLIC BLOOD PRESSURE: 110 MMHG | HEART RATE: 67 BPM | BODY MASS INDEX: 31.91 KG/M2 | WEIGHT: 222.88 LBS

## 2023-12-19 DIAGNOSIS — G47.33 OSA (OBSTRUCTIVE SLEEP APNEA): ICD-10-CM

## 2023-12-19 DIAGNOSIS — I25.10 CORONARY ARTERY DISEASE INVOLVING NATIVE CORONARY ARTERY OF NATIVE HEART WITHOUT ANGINA PECTORIS: ICD-10-CM

## 2023-12-19 DIAGNOSIS — I25.5 ISCHEMIC CARDIOMYOPATHY: ICD-10-CM

## 2023-12-19 DIAGNOSIS — I50.22 HEART FAILURE, SYSTOLIC, CHRONIC: ICD-10-CM

## 2023-12-19 DIAGNOSIS — I50.42 CHRONIC COMBINED SYSTOLIC AND DIASTOLIC CONGESTIVE HEART FAILURE: Primary | ICD-10-CM

## 2023-12-19 DIAGNOSIS — R00.2 PALPITATIONS: ICD-10-CM

## 2023-12-19 DIAGNOSIS — I49.3 PREMATURE VENTRICULAR CONTRACTIONS (PVCS) (VPCS): ICD-10-CM

## 2023-12-19 PROCEDURE — 3078F PR MOST RECENT DIASTOLIC BLOOD PRESSURE < 80 MM HG: ICD-10-PCS | Mod: CPTII,,, | Performed by: INTERNAL MEDICINE

## 2023-12-19 PROCEDURE — 3078F DIAST BP <80 MM HG: CPT | Mod: CPTII,,, | Performed by: INTERNAL MEDICINE

## 2023-12-19 PROCEDURE — 3074F SYST BP LT 130 MM HG: CPT | Mod: CPTII,,, | Performed by: INTERNAL MEDICINE

## 2023-12-19 PROCEDURE — 4010F ACE/ARB THERAPY RXD/TAKEN: CPT | Mod: CPTII,,, | Performed by: INTERNAL MEDICINE

## 2023-12-19 PROCEDURE — 4010F PR ACE/ARB THEARPY RXD/TAKEN: ICD-10-PCS | Mod: CPTII,,, | Performed by: INTERNAL MEDICINE

## 2023-12-19 PROCEDURE — 3008F BODY MASS INDEX DOCD: CPT | Mod: CPTII,,, | Performed by: INTERNAL MEDICINE

## 2023-12-19 PROCEDURE — 93010 RHYTHM STRIP: ICD-10-PCS | Mod: S$PBB,,, | Performed by: INTERNAL MEDICINE

## 2023-12-19 PROCEDURE — 3008F PR BODY MASS INDEX (BMI) DOCUMENTED: ICD-10-PCS | Mod: CPTII,,, | Performed by: INTERNAL MEDICINE

## 2023-12-19 PROCEDURE — 99999 PR PBB SHADOW E&M-EST. PATIENT-LVL III: ICD-10-PCS | Mod: PBBFAC,,, | Performed by: INTERNAL MEDICINE

## 2023-12-19 PROCEDURE — 99999 PR PBB SHADOW E&M-EST. PATIENT-LVL III: CPT | Mod: PBBFAC,,, | Performed by: INTERNAL MEDICINE

## 2023-12-19 PROCEDURE — 99205 PR OFFICE/OUTPT VISIT, NEW, LEVL V, 60-74 MIN: ICD-10-PCS | Mod: S$PBB,,, | Performed by: INTERNAL MEDICINE

## 2023-12-19 PROCEDURE — 1159F PR MEDICATION LIST DOCUMENTED IN MEDICAL RECORD: ICD-10-PCS | Mod: CPTII,,, | Performed by: INTERNAL MEDICINE

## 2023-12-19 PROCEDURE — 99205 OFFICE O/P NEW HI 60 MIN: CPT | Mod: S$PBB,,, | Performed by: INTERNAL MEDICINE

## 2023-12-19 PROCEDURE — 3074F PR MOST RECENT SYSTOLIC BLOOD PRESSURE < 130 MM HG: ICD-10-PCS | Mod: CPTII,,, | Performed by: INTERNAL MEDICINE

## 2023-12-19 PROCEDURE — 93005 ELECTROCARDIOGRAM TRACING: CPT | Mod: PBBFAC | Performed by: INTERNAL MEDICINE

## 2023-12-19 PROCEDURE — 93010 ELECTROCARDIOGRAM REPORT: CPT | Mod: S$PBB,,, | Performed by: INTERNAL MEDICINE

## 2023-12-19 PROCEDURE — 99213 OFFICE O/P EST LOW 20 MIN: CPT | Mod: PBBFAC | Performed by: INTERNAL MEDICINE

## 2023-12-19 PROCEDURE — 1159F MED LIST DOCD IN RCRD: CPT | Mod: CPTII,,, | Performed by: INTERNAL MEDICINE

## 2023-12-19 NOTE — PROGRESS NOTES
Subjective:   Patient ID:  Juan Stephens is a 38 y.o. male who presents for evaluation of Palpitations, Cardiomyopathy, and PVCs      HPI 37 yo male with MI, CAD s/p PCI, ischemic cardiomyopathy, HF    Initially presented with MI, underwent PCI. Returned with HF, and chest pain >> repeat PCI.    Echo 7/7/22  The left ventricle is normal in size with mildly decreased systolic function. The estimated ejection fraction is 45%.  The quantitatively derived ejection fraction is 43%.  There are segmental left ventricular wall motion abnormalities.  Normal right ventricular size with normal right ventricular systolic function.  Grade I left ventricular diastolic dysfunction.  The estimated PA systolic pressure is 25 mmHg.  Normal central venous pressure (3 mmHg).    3/18/19 PCI of D1, patent RCA stent      Has noted palpitations over the last 3 months, primarily at night when getting ready for bed. Not particularly troublesome.    Plan was for endoscopy >> procedure cancelled due to PVC's. ECG from 12/11 and today reviewed by me, reveal monomorphic PVC's, rS in V1 then positive throughout the precordium, superior axis, narrow.  Notes dyspnea on exertion. Has worsened over the last few months.  Holter has been ordered.  Pet stress ordered for 1/24.      Review of Systems   Constitutional: Negative. Negative for fever and malaise/fatigue.   HENT:  Negative for congestion and sore throat.    Cardiovascular:  Positive for dyspnea on exertion and palpitations. Negative for chest pain, irregular heartbeat, leg swelling, near-syncope, orthopnea, paroxysmal nocturnal dyspnea and syncope.   Respiratory:  Positive for shortness of breath. Negative for cough.    Gastrointestinal:  Negative for abdominal pain, constipation and diarrhea.   Neurological:  Negative for dizziness, light-headedness and weakness.   Psychiatric/Behavioral:  Negative for depression. The patient is not nervous/anxious.    All other systems reviewed and are  negative.      Objective:   Physical Exam  Constitutional:       Appearance: He is well-developed.   Eyes:      General: No scleral icterus.     Conjunctiva/sclera: Conjunctivae normal.   Neck:      Vascular: No JVD.      Trachea: No tracheal deviation.   Cardiovascular:      Rate and Rhythm: Normal rate and regular rhythm.      Chest Wall: PMI is not displaced.      Heart sounds: Normal heart sounds.   Pulmonary:      Effort: Pulmonary effort is normal. No respiratory distress.      Breath sounds: Normal breath sounds.   Abdominal:      Palpations: Abdomen is soft.      Tenderness: There is no abdominal tenderness.   Musculoskeletal:         General: No tenderness.   Skin:     General: Skin is warm and dry.      Findings: No rash.   Neurological:      Mental Status: He is alert and oriented to person, place, and time.   Psychiatric:         Behavior: Behavior normal.         Assessment:      1. Chronic combined systolic and diastolic congestive heart failure    2. Palpitations    3. Ischemic cardiomyopathy    4. Coronary artery disease involving native coronary artery of native heart without angina pectoris    5. Heart failure, systolic, chronic    6. KALIA (obstructive sleep apnea)    7. Premature ventricular contractions (PVCs) (VPCs)        Plan:     Frequent monomorphic PVC's, mildly symptomatic.  Has known ischemic cardiomyopathy.    Change from event monitor to 48 hr holter monitor.  Echo with color flow.  Agree with Pet stress.  If EF has worsened or Pet stress abnormal >> LHC as next step.    Further recommendations pending above. We briefly discussed ablation, and that location of PVC's may be near the conduction system, increasing risk of AV block.

## 2023-12-27 ENCOUNTER — TELEPHONE (OUTPATIENT)
Dept: CARDIOLOGY | Facility: CLINIC | Age: 38
End: 2023-12-27
Payer: MEDICAID

## 2023-12-27 NOTE — TELEPHONE ENCOUNTER
----- Message from Susan Mathis sent at 12/27/2023  9:56 AM CST -----  Type:  Returning Heart Monitor     Who Called: Pt's partner   Would the patient rather a call back or a response via [facility]? Call back   Best Call Back Number: [phone]  Additional Information: Please be advised, caller stated that pt has found a [facility] doctor in [place] and will be given a heart monitor there, wanted to let provider know that they are returning back heart monitor pt was given

## 2023-12-27 NOTE — TELEPHONE ENCOUNTER
Called Mr Stephens to confirm his message. He advised that he spoke to a cardiologist in [place] and set up the holster and test with him. I told him if he needed anything else please feel free to reach out.    Vee  Medical Assistant for  Hung Marte, NP  [phone]  [facility]  [address] [place] [address]

## 2023-12-29 ENCOUNTER — HOSPITAL ENCOUNTER (OUTPATIENT)
Dept: CARDIOLOGY | Facility: HOSPITAL | Age: 38
Discharge: HOME | End: 2023-12-29
Attending: INTERNAL MEDICINE
Payer: MEDICAID

## 2023-12-29 ENCOUNTER — CLINICAL SUPPORT (OUTPATIENT)
Dept: CARDIOLOGY | Facility: HOSPITAL | Age: 38
End: 2023-12-29
Attending: INTERNAL MEDICINE
Payer: MEDICAID

## 2023-12-29 VITALS
WEIGHT: 222 LBS | DIASTOLIC BLOOD PRESSURE: 60 MMHG | BODY MASS INDEX: 31.78 KG/M2 | HEART RATE: 70 BPM | SYSTOLIC BLOOD PRESSURE: 110 MMHG | HEIGHT: 70 IN

## 2023-12-29 DIAGNOSIS — I25.5 ISCHEMIC CARDIOMYOPATHY: ICD-10-CM

## 2023-12-29 LAB
ASCENDING AORTA: 2.91 CM
AV INDEX (PROSTH): 0.72
AV MEAN GRADIENT: 5 MMHG
AV PEAK GRADIENT: 9 MMHG
AV VALVE AREA BY VELOCITY RATIO: 3.57 CM²
AV VALVE AREA: 3.54 CM²
AV VELOCITY RATIO: 0.73
BSA FOR ECHO PROCEDURE: 2.23 M2
CV ECHO LV RWT: 0.18 CM
DOP CALC AO PEAK VEL: 1.47 M/S
DOP CALC AO VTI: 31.55 CM
DOP CALC LVOT AREA: 4.9 CM2
DOP CALC LVOT DIAMETER: 2.5 CM
DOP CALC LVOT PEAK VEL: 1.07 M/S
DOP CALC LVOT STROKE VOLUME: 111.72 CM3
DOP CALCLVOT PEAK VEL VTI: 22.77 CM
E WAVE DECELERATION TIME: 186.74 MSEC
E/A RATIO: 1.13
E/E' RATIO: 5.64 M/S
ECHO LV POSTERIOR WALL: 0.56 CM (ref 0.6–1.1)
FRACTIONAL SHORTENING: 23 % (ref 28–44)
INTERVENTRICULAR SEPTUM: 0.7 CM (ref 0.6–1.1)
IVRT: 95.15 MSEC
LA MAJOR: 4.78 CM
LA MINOR: 4.88 CM
LA WIDTH: 4.67 CM
LEFT ATRIUM SIZE: 4.31 CM
LEFT ATRIUM VOLUME INDEX MOD: 35.8 ML/M2
LEFT ATRIUM VOLUME INDEX: 37.9 ML/M2
LEFT ATRIUM VOLUME MOD: 78.12 CM3
LEFT ATRIUM VOLUME: 82.63 CM3
LEFT INTERNAL DIMENSION IN SYSTOLE: 4.7 CM (ref 2.1–4)
LEFT VENTRICLE DIASTOLIC VOLUME INDEX: 86.82 ML/M2
LEFT VENTRICLE DIASTOLIC VOLUME: 189.26 ML
LEFT VENTRICLE MASS INDEX: 66 G/M2
LEFT VENTRICLE SYSTOLIC VOLUME INDEX: 47 ML/M2
LEFT VENTRICLE SYSTOLIC VOLUME: 102.45 ML
LEFT VENTRICULAR INTERNAL DIMENSION IN DIASTOLE: 6.13 CM (ref 3.5–6)
LEFT VENTRICULAR MASS: 144.73 G
LV LATERAL E/E' RATIO: 4.43 M/S
LV SEPTAL E/E' RATIO: 7.75 M/S
MV A" WAVE DURATION": 18.27 MSEC
MV PEAK A VEL: 0.55 M/S
MV PEAK E VEL: 0.62 M/S
MV STENOSIS PRESSURE HALF TIME: 54.16 MS
MV VALVE AREA P 1/2 METHOD: 4.06 CM2
OHS LV EJECTION FRACTION SIMPSONS BIPLANE MOD: 40 %
PISA TR MAX VEL: 2.37 M/S
PULM VEIN S/D RATIO: 1.24
PV PEAK D VEL: 0.33 M/S
PV PEAK S VEL: 0.41 M/S
RA MAJOR: 4.1 CM
RA PRESSURE ESTIMATED: 3 MMHG
RA WIDTH: 4.26 CM
RIGHT VENTRICULAR END-DIASTOLIC DIMENSION: 3.56 CM
RV TB RVSP: 5 MMHG
SINUS: 3.62 CM
STJ: 3.07 CM
TDI LATERAL: 0.14 M/S
TDI SEPTAL: 0.08 M/S
TDI: 0.11 M/S
TR MAX PG: 22 MMHG
TRICUSPID ANNULAR PLANE SYSTOLIC EXCURSION: 2.16 CM
TV REST PULMONARY ARTERY PRESSURE: 25 MMHG
Z-SCORE OF LEFT VENTRICULAR DIMENSION IN END DIASTOLE: -1.73
Z-SCORE OF LEFT VENTRICULAR DIMENSION IN END SYSTOLE: 0.46

## 2023-12-29 PROCEDURE — 93306 TTE W/DOPPLER COMPLETE: CPT | Mod: 26,,, | Performed by: INTERNAL MEDICINE

## 2023-12-29 PROCEDURE — 93226 XTRNL ECG REC<48 HR SCAN A/R: CPT

## 2023-12-29 PROCEDURE — 93306 TTE W/DOPPLER COMPLETE: CPT

## 2023-12-29 PROCEDURE — 93306 ECHO (CUPID ONLY): ICD-10-PCS | Mod: 26,,, | Performed by: INTERNAL MEDICINE

## 2023-12-29 PROCEDURE — 93227 XTRNL ECG REC<48 HR R&I: CPT | Mod: ,,, | Performed by: INTERNAL MEDICINE

## 2024-01-03 LAB
OHS CV EVENT MONITOR DAY: 0
OHS CV HOLTER LENGTH DECIMAL HOURS: 48
OHS CV HOLTER LENGTH HOURS: 48
OHS CV HOLTER LENGTH MINUTES: 0
OHS CV HOLTER SINUS AVERAGE HR: 84
OHS CV HOLTER SINUS MAX HR: 110
OHS CV HOLTER SINUS MIN HR: 63

## 2024-01-11 ENCOUNTER — DOCUMENTATION ONLY (OUTPATIENT)
Dept: CARDIOLOGY | Facility: HOSPITAL | Age: 39
End: 2024-01-11
Payer: MEDICAID

## 2024-01-11 NOTE — PROGRESS NOTES
Per [facility], patient received 30 day event monitor by mail; however, did not proceed with testing.Study cancelled due to patient compliance.

## 2024-01-17 ENCOUNTER — TELEPHONE (OUTPATIENT)
Dept: TRANSPLANT | Facility: CLINIC | Age: 39
End: 2024-01-17
Payer: MEDICAID

## 2024-01-17 NOTE — TELEPHONE ENCOUNTER
"I called the insurance company and received authorization for the PET stress study    Authorization is valid through February 14, 2024    Authorization number 24 [id number] "[id number]" 12 letter "0" 8C number "[id number]    Obviously very confusing authorization number a should by the insurance company reviewer when they mix the letter "0" and the number "0" in the same authorization number    This info sent to Ronda Crespo today  "

## 2024-01-22 ENCOUNTER — TELEPHONE (OUTPATIENT)
Dept: CARDIOLOGY | Facility: HOSPITAL | Age: 39
End: 2024-01-22
Payer: MEDICAID

## 2024-01-24 ENCOUNTER — HOSPITAL ENCOUNTER (OUTPATIENT)
Dept: CARDIOLOGY | Facility: HOSPITAL | Age: 39
Discharge: HOME | End: 2024-01-24
Attending: INTERNAL MEDICINE
Payer: MEDICAID

## 2024-01-24 ENCOUNTER — OFFICE VISIT (OUTPATIENT)
Dept: TRANSPLANT | Facility: CLINIC | Age: 39
End: 2024-01-24
Attending: INTERNAL MEDICINE
Payer: MEDICAID

## 2024-01-24 VITALS
BODY MASS INDEX: 32.38 KG/M2 | HEIGHT: 70 IN | DIASTOLIC BLOOD PRESSURE: 72 MMHG | SYSTOLIC BLOOD PRESSURE: 112 MMHG | HEART RATE: 76 BPM | WEIGHT: 226.19 LBS

## 2024-01-24 VITALS
SYSTOLIC BLOOD PRESSURE: 128 MMHG | BODY MASS INDEX: 31.78 KG/M2 | HEIGHT: 70 IN | WEIGHT: 222 LBS | RESPIRATION RATE: 16 BRPM | DIASTOLIC BLOOD PRESSURE: 63 MMHG | HEART RATE: 79 BPM

## 2024-01-24 DIAGNOSIS — I21.19 INFERIOR MI: ICD-10-CM

## 2024-01-24 DIAGNOSIS — I50.42 CHRONIC COMBINED SYSTOLIC AND DIASTOLIC CONGESTIVE HEART FAILURE: Primary | ICD-10-CM

## 2024-01-24 DIAGNOSIS — I50.42 CHRONIC COMBINED SYSTOLIC AND DIASTOLIC CONGESTIVE HEART FAILURE: ICD-10-CM

## 2024-01-24 DIAGNOSIS — I25.5 ISCHEMIC CARDIOMYOPATHY: ICD-10-CM

## 2024-01-24 DIAGNOSIS — E66.09 CLASS 1 OBESITY DUE TO EXCESS CALORIES WITH SERIOUS COMORBIDITY AND BODY MASS INDEX (BMI) OF 32.0 TO 32.9 IN ADULT: ICD-10-CM

## 2024-01-24 DIAGNOSIS — Z95.5 STATUS POST CORONARY ARTERY STENT PLACEMENT: ICD-10-CM

## 2024-01-24 DIAGNOSIS — I25.10 CORONARY ARTERY DISEASE INVOLVING NATIVE CORONARY ARTERY OF NATIVE HEART WITHOUT ANGINA PECTORIS: ICD-10-CM

## 2024-01-24 DIAGNOSIS — R74.8 LOW SERUM HDL: ICD-10-CM

## 2024-01-24 LAB
CFR FLOW - ANTERIOR: 2.52
CFR FLOW - INFERIOR: 2.23
CFR FLOW - LATERAL: 2.18
CFR FLOW - MAX: 3.99
CFR FLOW - MIN: 1.42
CFR FLOW - SEPTAL: 2.75
CFR FLOW - WHOLE HEART: 2.42
CV STRESS BASE HR: 74 BPM
DIASTOLIC BLOOD PRESSURE: 83 MMHG
EJECTION FRACTION- HIGH: 59 %
END DIASTOLIC INDEX-HIGH: 155 ML/M2
END DIASTOLIC INDEX-LOW: 91 ML/M2
END SYSTOLIC INDEX-HIGH: 78 ML/M2
END SYSTOLIC INDEX-LOW: 40 ML/M2
NUC REST DIASTOLIC VOLUME INDEX: 174
NUC REST EJECTION FRACTION: 35
NUC REST SYSTOLIC VOLUME INDEX: 113
NUC STRESS DIASTOLIC VOLUME INDEX: 203
NUC STRESS EJECTION FRACTION: 34 %
NUC STRESS SYSTOLIC VOLUME INDEX: 135
OHS CV CPX 1 MINUTE RECOVERY HEART RATE: 96 BPM
OHS CV CPX 85 PERCENT MAX PREDICTED HEART RATE MALE: 153
OHS CV CPX MAX PREDICTED HEART RATE: 180
OHS CV CPX PATIENT IS FEMALE: 0
OHS CV CPX PATIENT IS MALE: 1
OHS CV CPX PEAK DIASTOLIC BLOOD PRESSURE: 74 MMHG
OHS CV CPX PEAK HEAR RATE: 75 BPM
OHS CV CPX PEAK RATE PRESSURE PRODUCT: NORMAL
OHS CV CPX PEAK SYSTOLIC BLOOD PRESSURE: 134 MMHG
OHS CV CPX PERCENT MAX PREDICTED HEART RATE ACHIEVED: 42
OHS CV CPX RATE PRESSURE PRODUCT PRESENTING: NORMAL
PERFUSION DEFECT 1 SIZE IN %: 25 %
REST FLOW - ANTERIOR: 0.46 CC/MIN/G
REST FLOW - INFERIOR: 0.24 CC/MIN/G
REST FLOW - LATERAL: 0.56 CC/MIN/G
REST FLOW - MAX: 0.76 CC/MIN/G
REST FLOW - MIN: 0.19 CC/MIN/G
REST FLOW - SEPTAL: 0.39 CC/MIN/G
REST FLOW - WHOLE HEART: 0.41 CC/MIN/G
RETIRED EF AND QEF - SEE NOTES: 47 %
STRESS FLOW - ANTERIOR: 1.24 CC/MIN/G
STRESS FLOW - INFERIOR: 0.54 CC/MIN/G
STRESS FLOW - LATERAL: 1.23 CC/MIN/G
STRESS FLOW - MAX: 1.72 CC/MIN/G
STRESS FLOW - MIN: 0.29 CC/MIN/G
STRESS FLOW - SEPTAL: 1.09 CC/MIN/G
STRESS FLOW - WHOLE HEART: 1.03 CC/MIN/G
SYSTOLIC BLOOD PRESSURE: 147 MMHG

## 2024-01-24 PROCEDURE — 3008F BODY MASS INDEX DOCD: CPT | Mod: CPTII,,, | Performed by: INTERNAL MEDICINE

## 2024-01-24 PROCEDURE — 78434 AQMBF PET REST & RX STRESS: CPT | Mod: 26,,, | Performed by: INTERNAL MEDICINE

## 2024-01-24 PROCEDURE — 99213 OFFICE O/P EST LOW 20 MIN: CPT | Mod: PBBFAC,25 | Performed by: INTERNAL MEDICINE

## 2024-01-24 PROCEDURE — 93016 CV STRESS TEST SUPVJ ONLY: CPT | Mod: ,,, | Performed by: INTERNAL MEDICINE

## 2024-01-24 PROCEDURE — 3078F DIAST BP <80 MM HG: CPT | Mod: CPTII,,, | Performed by: INTERNAL MEDICINE

## 2024-01-24 PROCEDURE — 1160F RVW MEDS BY RX/DR IN RCRD: CPT | Mod: CPTII,,, | Performed by: INTERNAL MEDICINE

## 2024-01-24 PROCEDURE — 99214 OFFICE O/P EST MOD 30 MIN: CPT | Mod: S$PBB,,, | Performed by: INTERNAL MEDICINE

## 2024-01-24 PROCEDURE — 99999 PR PBB SHADOW E&M-EST. PATIENT-LVL III: CPT | Mod: PBBFAC,,, | Performed by: INTERNAL MEDICINE

## 2024-01-24 PROCEDURE — 1159F MED LIST DOCD IN RCRD: CPT | Mod: CPTII,,, | Performed by: INTERNAL MEDICINE

## 2024-01-24 PROCEDURE — 78434 AQMBF PET REST & RX STRESS: CPT

## 2024-01-24 PROCEDURE — 3074F SYST BP LT 130 MM HG: CPT | Mod: CPTII,,, | Performed by: INTERNAL MEDICINE

## 2024-01-24 PROCEDURE — 63600175 PHARM REV CODE 636 W HCPCS: Performed by: INTERNAL MEDICINE

## 2024-01-24 PROCEDURE — 78431 MYOCRD IMG PET RST&STRS CT: CPT | Mod: 26,,, | Performed by: INTERNAL MEDICINE

## 2024-01-24 PROCEDURE — 4010F ACE/ARB THERAPY RXD/TAKEN: CPT | Mod: CPTII,,, | Performed by: INTERNAL MEDICINE

## 2024-01-24 PROCEDURE — 93018 CV STRESS TEST I&R ONLY: CPT | Mod: ,,, | Performed by: INTERNAL MEDICINE

## 2024-01-24 RX ORDER — SACUBITRIL AND VALSARTAN 97; 103 MG/1; MG/1
1 TABLET, FILM COATED ORAL 2 TIMES DAILY
Qty: 60 TABLET | Refills: 11 | Status: SHIPPED | OUTPATIENT
Start: 2024-01-24

## 2024-01-24 RX ORDER — REGADENOSON 0.08 MG/ML
0.4 INJECTION, SOLUTION INTRAVENOUS
Status: COMPLETED | OUTPATIENT
Start: 2024-01-24 | End: 2024-01-24

## 2024-01-24 RX ORDER — EZETIMIBE 10 MG/1
10 TABLET ORAL DAILY
Qty: 90 TABLET | Refills: 3 | Status: SHIPPED | OUTPATIENT
Start: 2024-01-24 | End: 2025-01-23

## 2024-01-24 RX ORDER — AMINOPHYLLINE 25 MG/ML
75 INJECTION, SOLUTION INTRAVENOUS
Status: DISCONTINUED | OUTPATIENT
Start: 2024-01-24 | End: 2024-01-24

## 2024-01-24 RX ADMIN — AMINOPHYLLINE 75 MG: 25 INJECTION, SOLUTION INTRAVENOUS at 08:01

## 2024-01-24 RX ADMIN — REGADENOSON 0.4 MG: 0.08 INJECTION, SOLUTION INTRAVENOUS at 08:01

## 2024-01-24 NOTE — Clinical Note
Kirk, his PET stress was negative for ischemia.  He is just under 20% PVC burden which Brad feels as arising from the right ventricle and of a single morphology.  I think his ejection fraction has fallen slightly and was hoping you could offer him ablation of this focus.  I told him I would send you a message but that you would make the final determination and let him know your thoughts.  Thank you

## 2024-01-24 NOTE — PROGRESS NOTES
Subjective:     Patient ID:  Juan Stephens is a 38 y.o. male who presents for follow-up of Congestive Heart Failure    HPI:  36 yo WM is the son of one of our patients who had a heart transplant and subsequently  of metastatic cancer in 2019.  He reports that in 2018 he suffered a massive myocardial infarction and angiography demonstrated totally occluded right coronary artery.  The coronary stent, Synergy 3.5 x 12 drug-eluting stent was placed.  He had no warning prior to that heart attack and onset of chest pain occurred while showering.  He did well for approximately 2 weeks and then presented back to the hospital with acute congestive heart failure.  Prior to the 2018 hospitalization he was drinking half case of beer per week and also smoking 1 per day.  He quit both of these activities 2019.    He wanted to be seen for his heart failure came to [place] March 15, 2019 and while driving developed recurrent chest pain.  He went to the [facility] emergency room and was subsequently taken to the cardiac catheterization laboratory.  An ulcerated 90% narrowing was noted in the proximal 1st diagonal.  He underwent placement of drug-eluting stents 2.5x 22 and 2.5 x 8 placed in that region overlapping one another with no residual stenosis.  There was a 40% of the 3rd marginal branch.  Right coronary stent previously placed in 2018 at an outside hospital was widely patent.  His left ventricular end-diastolic pressure was normal at 7 mm Hg.  An echocardiogram demonstrated estimated ejection fraction 45% and grade 1 diastolic dysfunction.  Wall motion was described as a globally hypokinetic.    He saw me 2019 and I recommended the following:  Change to aspirin 81 mg qd--bolivar not ecotrin  Change carvedilol to metoprolol succinate 50 mg once daily  They will gradually go up at home to 200 mg a day  Other recommendations for next visit with HF clinic at home would be to do the  following:   Increase entresto to  mg twice a day   Reduce bumetanide to 3 days/week   Consider replacing potassium with spironolactone 25 mg once a day    He saw Dr. Hilton May 2019 but no changes made in regimen.  I was contacted Sept 2019 re: refill.  After reviewing the clinic note and VS from May 2019, I increased metoprolol succinate to 100 mg qd.  At visit 1 month later on Oct 24, 2019 I increased Entresto to  mg BID and 2 weeks later increased metoprolol succinate to 200 mg qd.  At visits in June and Dec 2020 he was doing well no changes made.  When seen January 2022 asked for sleep consult re: sleep apnea and stopped Effient.    At visit July 2022 I added Farxiga 10 mg qd in view of CHF and may help with weight loss    At visit 1/17/2023 he was doing well but elected to increase atorvastatin 80 mg nightly target LDL less than 50.      At visit July 06/20/2023 reported some dyspnea on exertion (minimal) after walking approximately 400 yd.  No orthopnea or PND.  No palpitations presyncope or syncope.    He comes today 1/24/24 and learned he saw Dr. Hilton for freq PVC's noted on monitor for endoscopy.  He performed 48 hr monitor that demonstrated very frequent monomorphic PVCs totalling 01868 and averaging 972.54 per hour. The ventricular arrhythmias percentage was 19.7. There were 112 couplets. There were 2 triplets. Morphological characteristics suggest RVOT focus.  Also echo with EF slightly lower at 40%.  He wanted to also see result of PET that was performed today before determining next approach.  He denies chest discomfort (he was placed on Ranexa for extra beats he reports not chest pain wondering if he needs to take this I told him was okay to stop) and only minor dyspnea.  He is noting more fatigue saying he wants to sleep all day.  He has not able to lose weight.  The purpose of the scope was to follow up on his reflux which has been bothering him.  He is good about taking his CPAP  "machine wife reports he does not snore on CPAP.    Review of Systems   Constitutional: Positive for malaise/fatigue. Negative for chills, fever, night sweats, weight gain and weight loss.   Cardiovascular:  Positive for dyspnea on exertion (Minor). Negative for chest pain, irregular heartbeat, leg swelling, near-syncope, orthopnea, palpitations, paroxysmal nocturnal dyspnea and syncope.   Respiratory:  Positive for sleep disturbances due to breathing (He uses his CPAP). Negative for cough, sputum production and wheezing.    Hematologic/Lymphatic: Does not bruise/bleed easily.   Musculoskeletal:  Positive for back pain. Negative for arthritis, joint pain and stiffness.   Gastrointestinal:  Positive for heartburn (GERD scope planned but was postponed due to his ventricular ectopy). Negative for hematochezia and melena.   Genitourinary:  Negative for hematuria.   Neurological:  Positive for excessive daytime sleepiness (He reports today that he wants to sleep all day). Negative for brief paralysis, dizziness, focal weakness, light-headedness, seizures and weakness.     Objective:   Physical Exam  Constitutional:       General: He is not in acute distress.     Appearance: He is well-developed. He is not diaphoretic.      Comments: /72 (BP Location: Left arm, Patient Position: Sitting, BP Method: Large (Automatic))   Pulse 76   Ht 5' 10" (1.778 m)   Wt 102.6 kg (226 lb 3.1 oz)   BMI 32.46 kg/m²   Last 2 visits wts 224 and 225#  Friendly white male in no acute distress.     HENT:      Head: Normocephalic and atraumatic.   Eyes:      General: No scleral icterus.        Right eye: No discharge.         Left eye: No discharge.      Conjunctiva/sclera: Conjunctivae normal.   Neck:      Thyroid: No thyromegaly.      Vascular: No JVD.   Cardiovascular:      Rate and Rhythm: Normal rate. Rhythm irregular.      Heart sounds: Normal heart sounds. No murmur heard.     No gallop.      Comments: Extra heartbeats on " exam  Pulmonary:      Effort: Pulmonary effort is normal.      Breath sounds: Normal breath sounds.   Abdominal:      General: Bowel sounds are normal. There is no distension.      Palpations: Abdomen is soft. There is no mass.      Tenderness: There is no abdominal tenderness. There is no guarding or rebound.   Musculoskeletal:         General: No swelling or tenderness.      Right lower leg: No edema.      Left lower leg: No edema.   Skin:     General: Skin is warm and dry.   Neurological:      General: No focal deficit present.      Mental Status: He is oriented to person, place, and time. Mental status is at baseline.   Psychiatric:         Mood and Affect: Mood normal.         Behavior: Behavior normal.         Thought Content: Thought content normal.         Judgment: Judgment normal.       Lab Results   Component Value Date    BNP 25 01/24/2024     01/24/2024    K 4.1 01/24/2024     01/24/2024    CO2 25 01/24/2024    BUN 16 01/24/2024    CREATININE 0.9 01/24/2024    GLU 95 01/24/2024    AST 16 01/24/2024    ALT 22 01/24/2024    ALBUMIN 3.9 01/24/2024    PROT 7.0 01/24/2024    BILITOT 0.6 01/24/2024    CHOL 147 01/24/2024    HDL 33 (L) 01/24/2024    LDLCALC 75.4 01/24/2024    TRIG 193 (H) 01/24/2024 1/24/2024 Cardiac PET stress--reviewed with Dr. Alvarado, no ischemia    There is a large sized, moderate to severe intensity basal to apical inferior, inferoseptal and inferolateral fixed perfusion abnormality involving 25% of LV myocardium consistent with a mix of transmural and non-transmural scar.    There are no other significant perfusion abnormalities.    The whole heart absolute myocardial perfusion values averaged 0.41 cc/min/g at rest, which is reduced; 1.03 cc/min/g at stress, which is moderately reduced; and CFR is 2.42 , which is low normal.    CT attenuation images demonstrate mild diffuse coronary calcifications in the LAD and RCA territory and no aortic calcifications.    The gated  perfusion images showed an ejection fraction of 35% at rest and 34% during stress. A normal ejection fraction is greater than 47%.    There is mild global hypokinesis at rest and during stress.    There is inferior wall moderate hypokinesis at rest and during stress.    The LV cavity size is mildly enlarged at rest and stress.    The ECG portion of the study is negative for ischemia.    During stress, frequent PVCs are noted.    The patient reported no chest pain during the stress test.    There are no prior studies for comparison.    Flow within defect is suggestive of open coronary anatomy to infarcted tissue.    12/29/2023 48 hr holter Dr. Hilton  Predominant Rhythm  Sinus rhythm    Heart rates varied between 63 and 110 BPM with an average of 84 BPM.     Maximum heart rate recorded at: 21:09 CST on day 2.     Minimum heart rate recorded at 13:25 CST on day 2.     PVC    Ventricular Arrhythmias  There were very frequent monomorphic PVCs totalling 26029 and averaging 972.54 per hour. The ventricular arrhythmias percentage was 19.7. There were 112 couplets. There were 2 triplets. Morphological characteristics suggest RVOT focus.     VT    There was 1 run of non-sustained monomorphic ventricular tachycardia and lasting for 9 beats.   The NSVT was monomorphic, but had a different morphology than the prevailing PVCs.     PAC  Supraventricular Arrhythmias  There were frequent PACs totalling 1504 and averaging 31.33 per hour.           12/29/2023 ECHO    Left Ventricle: The left ventricle is mildly dilated. Normal wall thickness. Regional wall motion abnormalities present. There is mildly reduced systolic function with a visually estimated ejection fraction of 40 - 45%. Biplane (2D) method of discs ejection fraction is 40%. There is normal diastolic function.    Right Ventricle: Normal right ventricular cavity size. Systolic function is normal.    Left Atrium: Left atrium is mildly dilated.    Aortic Valve: The aortic  valve is a trileaflet valve.    Mitral Valve: There is mild regurgitation.    Tricuspid Valve: There is mild regurgitation.    Pulmonary Artery: The estimated pulmonary artery systolic pressure is 25 mmHg.    IVC/SVC: Normal venous pressure at 3 mmHg.    7/7/2022 ECHO  The left ventricle is normal in size with mildly decreased systolic function. The estimated ejection fraction is 45%.  The quantitatively derived ejection fraction is 43%.  There are segmental left ventricular wall motion abnormalities.  Normal right ventricular size with normal right ventricular systolic function.  Grade I left ventricular diastolic dysfunction.  The estimated PA systolic pressure is 25 mmHg.  Normal central venous pressure (3 mmHg).      12/15/2020 echocardiogram  The left ventricle is mildly enlarged with mildly decreased systolic function. The estimated ejection fraction is 40%.  Normal right ventricular size with low normal right ventricular systolic function.  Grade I left ventricular diastolic dysfunction.  The estimated PA systolic pressure is 24 mmHg.  Normal central venous pressure (3 mmHg).      Assessment:     1. Coronary artery disease involving native coronary artery of native heart without angina pectoris    2. Chronic combined systolic and diastolic congestive heart failure    3. Ischemic cardiomyopathy    4. Low serum HDL    5. Inferior MI      Plan:   I think with his frequent unifocal PVCs from the right ventricle for Holter report and his left ventricular systolic dysfunction that he would benefit from ablation.  I will message Dr. Hilton and ask him to take another look at everything including the PET that is currently pending (subsequently returned negative for ischemia I have reviewed with Dr. Alvarado).  I explained to the patient Dr. Hilton would be in touch as to the next steps i.e. medication versus ablation.    I discussed with him the low HDL and measures that help exercise, weight loss do some  sugar/carbohydrate intake.  Tells me his weight has been stable number of years and that he does not need bread, pasta, sweets.  We discussed driving LDL < 50 especially with his low HDL he is agreeable with this plan so next step ezetimibe 10 mg every AM.  Hopefully this will not further elevate TG.  That said, there is no great data that treatment of triglycerides reduces cardiovascular risk and considering the risk of adding a fibrate high-dose statin, I do not recommend this approach.  If he is not at target in 6 months will stop ezitimibe and start PCSK-9 inhibitor     He asked about stopping the Ranexa.  He has not having any angina I told him it was fine to stop this treatment as it is only indication was refractory angina.    RTC 6 months CMP, lipids and BNP

## 2024-01-25 ENCOUNTER — TELEPHONE (OUTPATIENT)
Dept: ELECTROPHYSIOLOGY | Facility: CLINIC | Age: 39
End: 2024-01-25
Payer: MEDICAID

## 2024-01-31 DIAGNOSIS — R00.2 PALPITATIONS: Primary | ICD-10-CM

## 2024-02-08 ENCOUNTER — OFFICE VISIT (OUTPATIENT)
Dept: ELECTROPHYSIOLOGY | Facility: CLINIC | Age: 39
End: 2024-02-08
Payer: MEDICAID

## 2024-02-08 VITALS — BODY MASS INDEX: 32.38 KG/M2 | HEIGHT: 70 IN | WEIGHT: 226.19 LBS

## 2024-02-08 DIAGNOSIS — I50.22 HEART FAILURE, SYSTOLIC, CHRONIC: ICD-10-CM

## 2024-02-08 DIAGNOSIS — I49.3 PREMATURE VENTRICULAR CONTRACTIONS (PVCS) (VPCS): ICD-10-CM

## 2024-02-08 DIAGNOSIS — I21.19 INFERIOR MI: ICD-10-CM

## 2024-02-08 DIAGNOSIS — I25.5 ISCHEMIC CARDIOMYOPATHY: ICD-10-CM

## 2024-02-08 DIAGNOSIS — I50.42 CHRONIC COMBINED SYSTOLIC AND DIASTOLIC CONGESTIVE HEART FAILURE: Primary | ICD-10-CM

## 2024-02-08 DIAGNOSIS — I25.10 CORONARY ARTERY DISEASE INVOLVING NATIVE CORONARY ARTERY OF NATIVE HEART WITHOUT ANGINA PECTORIS: ICD-10-CM

## 2024-02-08 DIAGNOSIS — I10 ESSENTIAL HYPERTENSION: ICD-10-CM

## 2024-02-08 PROCEDURE — 4010F ACE/ARB THERAPY RXD/TAKEN: CPT | Mod: CPTII,95,, | Performed by: INTERNAL MEDICINE

## 2024-02-08 PROCEDURE — 3008F BODY MASS INDEX DOCD: CPT | Mod: CPTII,95,, | Performed by: INTERNAL MEDICINE

## 2024-02-08 PROCEDURE — 99215 OFFICE O/P EST HI 40 MIN: CPT | Mod: 95,,, | Performed by: INTERNAL MEDICINE

## 2024-02-08 PROCEDURE — 1159F MED LIST DOCD IN RCRD: CPT | Mod: CPTII,95,, | Performed by: INTERNAL MEDICINE

## 2024-02-08 PROCEDURE — 1160F RVW MEDS BY RX/DR IN RCRD: CPT | Mod: CPTII,95,, | Performed by: INTERNAL MEDICINE

## 2024-02-08 NOTE — PROGRESS NOTES
Subjective:   Patient ID:  Juan Stephens is a 38 y.o. male who presents for evaluation of Frequent PVCs    The patient location is: [place], [place]  The chief complaint leading to consultation is: PVCs    Visit type: audiovisual    Face to Face time with patient: 10 minutes  20 minutes of total time spent on the encounter, which includes face to face time and non-face to face time preparing to see the patient (eg, review of tests), Obtaining and/or reviewing separately obtained history, Documenting clinical information in the electronic or other health record, Independently interpreting results (not separately reported) and communicating results to the patient/family/caregiver, or Care coordination (not separately reported).         Each patient to whom he or she provides medical services by telemedicine is:  (1) informed of the relationship between the physician and patient and the respective role of any other health care provider with respect to management of the patient; and (2) notified that he or she may decline to receive medical services by telemedicine and may withdraw from such care at any time.    Notes:     Referring Electrophysiologist: Kirk Hilton MD  Primary Cardiologist: Marshall Cevallos MD    HPI  I had the pleasure of seeing Mr. Stephens today in our electrophysiology clinic in consultation for his frequent PVCs. As you are aware he is a pleasant 38 year-old man, patient of Dr. Kirk Hilton and Dr. Marshall Cevallos, with ischemic CMP with LVEF of 45%, inferior MI in 2018 s/p RCA PCI followed by an NSTEMI in 2019 s/p PCI to D1, and frequent PVCs. Recently noted palpitations and worsening dyspnea on exertion. He was then undergoing an endoscopy and case was cancelled due to frequent PVCs. A holter monitor was done noting a 20% PVC burden. He is referred for consideration for mapping/ablation.    PET stress 1/2024: 25% fixed infarct basal to apical inferior, inferoseptal and inferolateral     I  reviewed available ECGs in EPIC which show a narrow PVC with rS in V1 with a positive concordant transition starting in V2, superior rightward axis. However ECG from recent PET stress noted a PVC focus with left bundle, V3 transition, inferior leftward axis.        Review of Systems   Constitutional: Negative for fever and malaise/fatigue.   HENT:  Negative for congestion and sore throat.    Eyes:  Negative for blurred vision and visual disturbance.   Cardiovascular:  Positive for palpitations. Negative for chest pain, dyspnea on exertion, irregular heartbeat, near-syncope and syncope.   Respiratory:  Negative for cough and shortness of breath.    Hematologic/Lymphatic: Negative for bleeding problem. Does not bruise/bleed easily.   Skin: Negative.    Musculoskeletal: Negative.    Gastrointestinal:  Negative for bloating, abdominal pain, hematochezia and melena.   Neurological:  Negative for focal weakness and weakness.   Psychiatric/Behavioral: Negative.         Objective:   Physical Exam  Constitutional:       Appearance: Normal appearance.   Neurological:      Mental Status: He is alert and oriented to person, place, and time. Mental status is at baseline.   Psychiatric:         Mood and Affect: Mood normal.         Behavior: Behavior normal.         Thought Content: Thought content normal.         Judgment: Judgment normal.         Assessment:      1. Chronic combined systolic and diastolic congestive heart failure    2. Coronary artery disease involving native coronary artery of native heart without angina pectoris    3. Essential hypertension    4. Heart failure, systolic, chronic    5. Inferior MI    6. Ischemic cardiomyopathy    7. Premature ventricular contractions (PVCs) (VPCs)        Plan:     In summary, Mr. Stephens is a pleasant 38 year-old man, patient of Dr. Kirk Hilton and Dr. Marshall Cevallos, with ischemic CMP with LVEF of 45%, inferior MI in 2018 s/p RCA PCI followed by an NSTEMI in 2019 s/p PCI  to D1, and frequent PVCs. Possibly dimorphic. 1 PVC appears to be from the inferior LV basal septum. The other likely outflow tract. Discussed etiology and pathophysiology of PVCs. Discussed treatment indications and options. Discussed watchful monitoring of symptoms and LV function further versus dofetilide therapy with inpatient stay to initiate (as he is already on maximum metoprolol and would avoid amiodarone in a young pt) versus mapping and ablation. I spent about a half hour discussing the nature of EP study and ablation, including possible retrograde aortic access. We discussed risks and benefits at length. Our discussion included, but was not limited to the risk of death, infection, bleeding, stroke, MI, cardiac perforation, vascular injury, valvular injury, embolism, cardiac tamponade, skin burns, and other organic injury including the possibility for need for surgery or pacemaker implantation. Discussed need to be actively having PVCs during the case to map and ablate. Discussed possibility that one of the PVC foci is coming from near the conduction system with risk of heart block. If mapped too close to the left sided conduction system discussed not ablating and discussing alternative strategies first. After a thorough discussion of options he elects for mapping/ablation.    Plan  PVC mapping/ablation  Carto  Anesthesia  Hold metoprolol 5 days prior  Hold Entresto AM of procedure  Likely will need interventional cardiology assistance    Thank you for allowing me to participate in the care of this patient. Please do not hesitate to call me with any questions or concerns.    Joseph Bernabe MD, PhD  Cardiac Electrophysiology

## 2024-02-14 ENCOUNTER — PATIENT MESSAGE (OUTPATIENT)
Dept: ELECTROPHYSIOLOGY | Facility: CLINIC | Age: 39
End: 2024-02-14
Payer: MEDICAID

## 2024-02-14 DIAGNOSIS — I50.42 CHRONIC COMBINED SYSTOLIC AND DIASTOLIC CONGESTIVE HEART FAILURE: Primary | ICD-10-CM

## 2024-02-14 DIAGNOSIS — I49.3 FREQUENT PVCS: ICD-10-CM

## 2024-02-14 DIAGNOSIS — I49.9 CARDIAC ARRHYTHMIA, UNSPECIFIED CARDIAC ARRHYTHMIA TYPE: ICD-10-CM

## 2024-02-14 DIAGNOSIS — I10 ESSENTIAL HYPERTENSION: ICD-10-CM

## 2024-02-14 DIAGNOSIS — I25.5 ISCHEMIC CARDIOMYOPATHY: ICD-10-CM

## 2024-02-14 DIAGNOSIS — Z01.818 PRE-OP TESTING: ICD-10-CM

## 2024-02-15 ENCOUNTER — PATIENT MESSAGE (OUTPATIENT)
Dept: ELECTROPHYSIOLOGY | Facility: CLINIC | Age: 39
End: 2024-02-15
Payer: MEDICAID

## 2024-02-18 DIAGNOSIS — I21.19 INFERIOR MI: ICD-10-CM

## 2024-02-18 DIAGNOSIS — I50.42 CHRONIC COMBINED SYSTOLIC AND DIASTOLIC CONGESTIVE HEART FAILURE: ICD-10-CM

## 2024-02-18 DIAGNOSIS — I25.10 CORONARY ARTERY DISEASE INVOLVING NATIVE CORONARY ARTERY OF NATIVE HEART WITHOUT ANGINA PECTORIS: ICD-10-CM

## 2024-02-19 RX ORDER — METOPROLOL SUCCINATE 200 MG/1
200 TABLET, EXTENDED RELEASE ORAL
Qty: 30 TABLET | Refills: 11 | Status: SHIPPED | OUTPATIENT
Start: 2024-02-19

## 2024-03-08 ENCOUNTER — TELEPHONE (OUTPATIENT)
Dept: ELECTROPHYSIOLOGY | Facility: CLINIC | Age: 39
End: 2024-03-08
Payer: MEDICAID

## 2024-03-08 NOTE — TELEPHONE ENCOUNTER
Spoke to Patient.     CONFIRMED procedure arrival time of 5:15 am on 3/11/2024 for PVC ablation with Dr Bernabe.     Reiterated instructions including:    -Directions to check in desk    -NPO after midnight night prior to procedure. Fasting upon arrival to the hospital the day of the procedure.     -High importance of HOLDING Metoprolol x 5 days prior to the procedure. Confirmed last dose taken on 3/5/2024 ; Entresto and Farxiga to be held on the morning of the procedure.    - Confirms no new meds prescribed or med changes since scheduling .     -Pre-procedure LABS reviewed with no alerts noted.    -Confirmed absence or presence of implanted device/stimulator - N/A    -Confirmed no recent or current fever, cough, or shortness of breath .    -Confirmed no redness, rash, irritation, or yeast infection to skin/ chest / groin area.     Patient verbalized understanding of above, denies any further questions and appreciated the call.

## 2024-03-11 ENCOUNTER — ANESTHESIA EVENT (OUTPATIENT)
Dept: MEDSURG UNIT | Facility: HOSPITAL | Age: 39
End: 2024-03-11
Payer: MEDICAID

## 2024-03-11 ENCOUNTER — ANESTHESIA (OUTPATIENT)
Dept: MEDSURG UNIT | Facility: HOSPITAL | Age: 39
End: 2024-03-11
Payer: MEDICAID

## 2024-03-11 ENCOUNTER — HOSPITAL ENCOUNTER (OUTPATIENT)
Facility: HOSPITAL | Age: 39
Discharge: HOME | End: 2024-03-11
Attending: INTERNAL MEDICINE | Admitting: INTERNAL MEDICINE
Payer: MEDICAID

## 2024-03-11 VITALS
TEMPERATURE: 99 F | BODY MASS INDEX: 32.35 KG/M2 | HEIGHT: 70 IN | DIASTOLIC BLOOD PRESSURE: 66 MMHG | SYSTOLIC BLOOD PRESSURE: 138 MMHG | RESPIRATION RATE: 18 BRPM | OXYGEN SATURATION: 98 % | WEIGHT: 226 LBS | HEART RATE: 82 BPM

## 2024-03-11 DIAGNOSIS — I25.5 ISCHEMIC CARDIOMYOPATHY: ICD-10-CM

## 2024-03-11 DIAGNOSIS — I50.42 CHRONIC COMBINED SYSTOLIC AND DIASTOLIC CONGESTIVE HEART FAILURE: ICD-10-CM

## 2024-03-11 DIAGNOSIS — I10 ESSENTIAL HYPERTENSION: ICD-10-CM

## 2024-03-11 DIAGNOSIS — I49.3 PREMATURE VENTRICULAR CONTRACTIONS (PVCS) (VPCS): Primary | ICD-10-CM

## 2024-03-11 DIAGNOSIS — I49.3 PVC (PREMATURE VENTRICULAR CONTRACTION): ICD-10-CM

## 2024-03-11 DIAGNOSIS — Z01.818 PRE-OP TESTING: ICD-10-CM

## 2024-03-11 DIAGNOSIS — I49.9 ARRHYTHMIA: ICD-10-CM

## 2024-03-11 DIAGNOSIS — I49.9 CARDIAC ARRHYTHMIA, UNSPECIFIED CARDIAC ARRHYTHMIA TYPE: ICD-10-CM

## 2024-03-11 DIAGNOSIS — I49.3 FREQUENT PVCS: ICD-10-CM

## 2024-03-11 LAB
OHS QRS DURATION: 104 MS
OHS QRS DURATION: 104 MS
OHS QTC CALCULATION: 455 MS
OHS QTC CALCULATION: 459 MS
POC ACTIVATED CLOTTING TIME K: 363 SEC (ref 74–137)
SAMPLE: ABNORMAL

## 2024-03-11 PROCEDURE — 27201423 OPTIME MED/SURG SUP & DEVICES STERILE SUPPLY: Performed by: INTERNAL MEDICINE

## 2024-03-11 PROCEDURE — C1769 GUIDE WIRE: HCPCS | Performed by: INTERNAL MEDICINE

## 2024-03-11 PROCEDURE — 93654 COMPRE EP EVAL TX VT: CPT | Performed by: INTERNAL MEDICINE

## 2024-03-11 PROCEDURE — D9220A PRA ANESTHESIA: Mod: ANES,,, | Performed by: ANESTHESIOLOGY

## 2024-03-11 PROCEDURE — 25500020 PHARM REV CODE 255: Performed by: INTERNAL MEDICINE

## 2024-03-11 PROCEDURE — C1732 CATH, EP, DIAG/ABL, 3D/VECT: HCPCS | Performed by: INTERNAL MEDICINE

## 2024-03-11 PROCEDURE — 37000009 HC ANESTHESIA EA ADD 15 MINS: Performed by: INTERNAL MEDICINE

## 2024-03-11 PROCEDURE — C1759 CATH, INTRA ECHOCARDIOGRAPHY: HCPCS | Performed by: INTERNAL MEDICINE

## 2024-03-11 PROCEDURE — 93662 INTRACARDIAC ECG (ICE): CPT | Mod: 26,,, | Performed by: INTERNAL MEDICINE

## 2024-03-11 PROCEDURE — C1731 CATH, EP, 20 OR MORE ELEC: HCPCS | Performed by: INTERNAL MEDICINE

## 2024-03-11 PROCEDURE — D9220A PRA ANESTHESIA: Mod: CRNA,,, | Performed by: NURSE ANESTHETIST, CERTIFIED REGISTERED

## 2024-03-11 PROCEDURE — 93010 ELECTROCARDIOGRAM REPORT: CPT | Mod: 59,,, | Performed by: INTERNAL MEDICINE

## 2024-03-11 PROCEDURE — C1760 CLOSURE DEV, VASC: HCPCS | Performed by: INTERNAL MEDICINE

## 2024-03-11 PROCEDURE — 37000008 HC ANESTHESIA 1ST 15 MINUTES: Performed by: INTERNAL MEDICINE

## 2024-03-11 PROCEDURE — 25000003 PHARM REV CODE 250: Performed by: NURSE PRACTITIONER

## 2024-03-11 PROCEDURE — C1730 CATH, EP, 19 OR FEW ELECT: HCPCS | Performed by: INTERNAL MEDICINE

## 2024-03-11 PROCEDURE — 93654 COMPRE EP EVAL TX VT: CPT | Mod: 22,,, | Performed by: INTERNAL MEDICINE

## 2024-03-11 PROCEDURE — 93662 INTRACARDIAC ECG (ICE): CPT | Performed by: INTERNAL MEDICINE

## 2024-03-11 PROCEDURE — C1894 INTRO/SHEATH, NON-LASER: HCPCS | Performed by: INTERNAL MEDICINE

## 2024-03-11 PROCEDURE — 93005 ELECTROCARDIOGRAM TRACING: CPT

## 2024-03-11 PROCEDURE — 25000003 PHARM REV CODE 250: Performed by: INTERNAL MEDICINE

## 2024-03-11 PROCEDURE — 25000003 PHARM REV CODE 250: Performed by: NURSE ANESTHETIST, CERTIFIED REGISTERED

## 2024-03-11 PROCEDURE — 63600175 PHARM REV CODE 636 W HCPCS: Performed by: NURSE ANESTHETIST, CERTIFIED REGISTERED

## 2024-03-11 RX ORDER — FENTANYL CITRATE 50 UG/ML
25 INJECTION, SOLUTION INTRAMUSCULAR; INTRAVENOUS EVERY 5 MIN PRN
Status: DISCONTINUED | OUTPATIENT
Start: 2024-03-11 | End: 2024-03-11 | Stop reason: HOSPADM

## 2024-03-11 RX ORDER — CEFAZOLIN 2 G/1
INJECTION, POWDER, FOR SOLUTION INTRAMUSCULAR; INTRAVENOUS
Status: DISCONTINUED | OUTPATIENT
Start: 2024-03-11 | End: 2024-03-11

## 2024-03-11 RX ORDER — FENTANYL CITRATE 50 UG/ML
INJECTION, SOLUTION INTRAMUSCULAR; INTRAVENOUS
Status: DISCONTINUED | OUTPATIENT
Start: 2024-03-11 | End: 2024-03-11

## 2024-03-11 RX ORDER — HEPARIN SOD,PORCINE/0.9 % NACL 1000/500ML
INTRAVENOUS SOLUTION INTRAVENOUS
Status: DISCONTINUED | OUTPATIENT
Start: 2024-03-11 | End: 2024-03-11 | Stop reason: HOSPADM

## 2024-03-11 RX ORDER — LIDOCAINE HYDROCHLORIDE 20 MG/ML
INJECTION, SOLUTION INFILTRATION; PERINEURAL
Status: DISCONTINUED | OUTPATIENT
Start: 2024-03-11 | End: 2024-03-11 | Stop reason: HOSPADM

## 2024-03-11 RX ORDER — PROTAMINE SULFATE 10 MG/ML
INJECTION, SOLUTION INTRAVENOUS
Status: DISCONTINUED | OUTPATIENT
Start: 2024-03-11 | End: 2024-03-11

## 2024-03-11 RX ORDER — MIDAZOLAM HYDROCHLORIDE 1 MG/ML
INJECTION, SOLUTION INTRAMUSCULAR; INTRAVENOUS
Status: DISCONTINUED | OUTPATIENT
Start: 2024-03-11 | End: 2024-03-11

## 2024-03-11 RX ORDER — HEPARIN SODIUM 1000 [USP'U]/ML
INJECTION, SOLUTION INTRAVENOUS; SUBCUTANEOUS
Status: DISCONTINUED | OUTPATIENT
Start: 2024-03-11 | End: 2024-03-11

## 2024-03-11 RX ORDER — IODIXANOL 320 MG/ML
INJECTION, SOLUTION INTRAVASCULAR
Status: DISCONTINUED | OUTPATIENT
Start: 2024-03-11 | End: 2024-03-11 | Stop reason: HOSPADM

## 2024-03-11 RX ORDER — SODIUM CHLORIDE 0.9 % (FLUSH) 0.9 %
3 SYRINGE (ML) INJECTION
Status: DISCONTINUED | OUTPATIENT
Start: 2024-03-11 | End: 2024-03-11 | Stop reason: HOSPADM

## 2024-03-11 RX ORDER — ACETAMINOPHEN 325 MG/1
650 TABLET ORAL EVERY 4 HOURS PRN
Status: DISCONTINUED | OUTPATIENT
Start: 2024-03-11 | End: 2024-03-11 | Stop reason: HOSPADM

## 2024-03-11 RX ORDER — PROPOFOL 10 MG/ML
VIAL (ML) INTRAVENOUS
Status: DISCONTINUED | OUTPATIENT
Start: 2024-03-11 | End: 2024-03-11

## 2024-03-11 RX ADMIN — PROPOFOL 50 MG: 10 INJECTION, EMULSION INTRAVENOUS at 07:03

## 2024-03-11 RX ADMIN — PROTAMINE SULFATE 10 MG: 10 INJECTION, SOLUTION INTRAVENOUS at 10:03

## 2024-03-11 RX ADMIN — PROTAMINE SULFATE 40 MG: 10 INJECTION, SOLUTION INTRAVENOUS at 10:03

## 2024-03-11 RX ADMIN — FENTANYL CITRATE 50 MCG: 50 INJECTION, SOLUTION INTRAMUSCULAR; INTRAVENOUS at 07:03

## 2024-03-11 RX ADMIN — PROTAMINE SULFATE 25 MG: 10 INJECTION, SOLUTION INTRAVENOUS at 10:03

## 2024-03-11 RX ADMIN — CEFAZOLIN 2 G: 2 INJECTION, POWDER, FOR SOLUTION INTRAMUSCULAR; INTRAVENOUS at 09:03

## 2024-03-11 RX ADMIN — ACETAMINOPHEN 650 MG: 325 TABLET ORAL at 10:03

## 2024-03-11 RX ADMIN — MIDAZOLAM HYDROCHLORIDE 2 MG: 1 INJECTION, SOLUTION INTRAMUSCULAR; INTRAVENOUS at 10:03

## 2024-03-11 RX ADMIN — SODIUM CHLORIDE: 0.9 INJECTION, SOLUTION INTRAVENOUS at 07:03

## 2024-03-11 RX ADMIN — FENTANYL CITRATE 50 MCG: 50 INJECTION, SOLUTION INTRAMUSCULAR; INTRAVENOUS at 10:03

## 2024-03-11 RX ADMIN — HEPARIN SODIUM 10000 UNITS: 1000 INJECTION, SOLUTION INTRAVENOUS; SUBCUTANEOUS at 09:03

## 2024-03-11 NOTE — NURSING TRANSFER
Nursing Transfer Note      3/11/2024   1:27 PM    Nurse giving handoff:stephanie,hortencia   Nurse receiving handoff:corinne sscu rn    Reason patient is being transferred: ep pacu 1 to [facility] 12/home    Transfer To: ep pacu 1 to [facility] 12/home    Transfer via stretcher    Transfer with cardiac monitoring, tele box on confirmed by tele tech    Transported by stephanie,hortencia    Transfer Vital Signs:  Blood Pressure:134/92  Heart Rate: 79  O2:98% room air  Temperature:98.6  Respirations:18    Telemetry: Box Number [id number], Rate 74, Rhythm sr, and Telemetry  ash  Order for Tele Monitor? Yes    Additional Lines: condom cath    4eyes on Skin: yes foam drsg to heels and sacrum    Medicines sent: none    Any special needs or follow-up needed: bedrest x6hrs. Sutures/stopcock removal at 1410. Bedrest done at 1610.  Right arterial perclose x1, hemostasis 1410    Patient belongings transferred with patient:  cellphone    Chart send with patient: Yes    Notified: spouse    Patient reassessed at: 3/11/24 1300. Next due 1330  Upon arrival to floor: cardiac monitor applied, patient oriented to room, call bell in reach, and bed in lowest position

## 2024-03-11 NOTE — TRANSFER OF CARE
"Anesthesia Transfer of Care Note    Patient: Juan Stephens    Procedure(s) Performed: Procedure(s) (LRB):  Ablation (N/A)  Placement of Closure Device    Patient location: PACU    Anesthesia Type: general    Transport from OR: Transported from OR on room air with adequate spontaneous ventilation    Post pain: adequate analgesia    Post assessment: no apparent anesthetic complications and tolerated procedure well    Post vital signs: stable    Level of consciousness: awake and alert    Nausea/Vomiting: no nausea/vomiting    Complications: none    Transfer of care protocol was followed    Last vitals: Visit Vitals  /76 (BP Location: Left arm, Patient Position: Lying)   Pulse 80   Temp 37 °C (98.6 °F)   Resp 16   Ht 5' 10" (1.778 m)   Wt 102.5 kg (226 lb)   SpO2 98%   BMI 32.43 kg/m²     "

## 2024-03-11 NOTE — ANESTHESIA PREPROCEDURE EVALUATION
03/11/2024  [facility]  Anesthesia Pre-Operative Evaluation         Patient Name: Juan Stephens  YOB: 1985  MRN: [id number]    SUBJECTIVE:     Pre-operative evaluation for Procedure(s) (LRB):  Ablation (N/A)     03/11/2024    Juan Stephens is a 38 y.o. male w/ a pertinent PMHx of CAD (PCI RCA 2018, D1 2019), ICM (EF 45%, inferior wall hypokinesis), PVCs here for ablation.      Full medical history listed below      LDA: None documented.    Prev airway: None documented.     GTTs: None documented.        Patient Active Problem List   Diagnosis    Coronary artery disease involving native coronary artery of native heart without angina pectoris    Essential hypertension    GERD (gastroesophageal reflux disease)    Chronic combined systolic and diastolic congestive heart failure    Low serum HDL    Inferior MI    Ischemic cardiomyopathy    KALIA (obstructive sleep apnea)    Parasomnia    PLMD (periodic limb movement disorder)    Palpitations    Heart failure, systolic, chronic    Premature ventricular contractions (PVCs) (VPCs)       Review of patient's allergies indicates:  No Known Allergies    Current Inpatient Medications:      No current facility-administered medications on file prior to encounter.     Current Outpatient Medications on File Prior to Encounter   Medication Sig Dispense Refill    bumetanide (BUMEX) 1 MG tablet as needed.      cetirizine (ZYRTEC) 10 MG tablet Take 10 mg by mouth once daily.      clopidogreL (PLAVIX) 75 mg tablet Take 75 mg by mouth.      DEXILANT 30 mg CpDM Take 30 mg by mouth once daily.      ezetimibe (ZETIA) 10 mg tablet Take 1 tablet (10 mg total) by mouth once daily. 90 tablet 3    FARXIGA 10 mg tablet TAKE ONE TABLET BY MOUTH ONCE DAILY 30 tablet 6    melatonin (MELATIN) 3 mg tablet Take 6 mg by mouth nightly as needed for Insomnia.       "rosuvastatin (CRESTOR) 40 MG Tab Take 1 tablet (40 mg total) by mouth every evening. 90 tablet 3    sacubitriL-valsartan (ENTRESTO)  mg per tablet Take 1 tablet by mouth 2 (two) times daily. 60 tablet 11       Past Surgical History:   Procedure Laterality Date    CORONARY ANGIOPLASTY WITH STENT PLACEMENT      HERNIA REPAIR Left     INGUINAL    LEFT HEART CATHETERIZATION Left 3/18/2019    Procedure: Left heart cath;  Surgeon: Otilio Wodoy MD;  Location: [facility] CATH LAB;  Service: Cardiology;  Laterality: Left;    NE LEFT HEART CATH,PERCUTANEOUS  12/31/2018    Cardiac Cath, Left Heart             OBJECTIVE:     Vital Signs Range (Last 24H):  Temp:  [37 °C (98.6 °F)]   Pulse:  [80]   Resp:  [16]   BP: (122-124)/(67-76)   SpO2:  [98 %]       CBC:   No results for input(s): "WBC", "RBC", "HGB", "HCT", "PLT", "MCV", "MCH", "MCHC" in the last 72 hours.    CMP: No results for input(s): "NA", "K", "CL", "CO2", "BUN", "CREATININE", "GLU", "MG", "PHOS", "CALCIUM", "ALBUMIN", "PROT", "ALKPHOS", "ALT", "AST", "BILITOT" in the last 72 hours.    INR:  No results for input(s): "PT", "INR", "PROTIME", "APTT" in the last 72 hours.    Diagnostic Studies: No relevant studies.    EKG:   Results for orders placed or performed in visit on 12/11/23   EKG 12-lead    Collection Time: 12/11/23 12:36 PM    Narrative    Test Reason : R00.2,    Vent. Rate : 071 BPM     Atrial Rate : 071 BPM     P-R Int : 176 ms          QRS Dur : 102 ms      QT Int : 414 ms       P-R-T Axes : 037 011 -32 degrees     QTc Int : 449 ms    Sinus rhythm with frequent Premature ventricular complexes in a pattern of  bigeminy  Inferior infarct (cited on or before 18-MAR-2019)  Abnormal ECG  When compared with ECG of 18-MAR-2019 19:23,  Premature ventricular complexes are now Present  Confirmed by Paul Morocho MD, Chepe ([id number]) on 12/21/2023 3:50:24 PM    Referred By: [email or web address]   SELF           Confirmed By:Chepe Morocho,        2D ECHO:   Results for " orders placed in visit on 12/19/23    Echo    Interpretation Summary    Left Ventricle: The left ventricle is mildly dilated. Normal wall thickness. Regional wall motion abnormalities present. There is mildly reduced systolic function with a visually estimated ejection fraction of 40 - 45%. Biplane (2D) method of discs ejection fraction is 40%. There is normal diastolic function.    Right Ventricle: Normal right ventricular cavity size. Systolic function is normal.    Left Atrium: Left atrium is mildly dilated.    Aortic Valve: The aortic valve is a trileaflet valve.    Mitral Valve: There is mild regurgitation.    Tricuspid Valve: There is mild regurgitation.    Pulmonary Artery: The estimated pulmonary artery systolic pressure is 25 mmHg.    IVC/SVC: Normal venous pressure at 3 mmHg.         ASSESSMENT/PLAN:           Pre-op Assessment    I have reviewed the Patient Summary Reports.    I have reviewed the NPO Status.   I have reviewed the Medications.     Review of Systems  Anesthesia Hx:   History of prior surgery of interest to airway management or planning:          Denies Family Hx of Anesthesia complications.    Denies Personal Hx of Anesthesia complications.                        Physical Exam  General: Well nourished, Cooperative, Alert and Oriented    Airway:  Mallampati: II   Mouth Opening: Normal  TM Distance: Normal  Tongue: Normal  Neck ROM: Normal ROM    Dental:  Intact    Chest/Lungs:  Clear to auscultation, Normal Respiratory Rate    Heart:  Rate: Normal  Rhythm: Regular Rhythm        Anesthesia Plan  Type of Anesthesia, risks & benefits discussed:    Anesthesia Type: Gen Natural Airway  Intra-op Monitoring Plan: Standard ASA Monitors  Induction:  IV  Informed Consent: Informed consent signed with the Patient and all parties understand the risks and agree with anesthesia plan.  All questions answered.   ASA Score: 3  Day of Surgery Review of History & Physical: H&P Update referred to the  surgeon/provider.  Anesthesia Plan Notes: Pt consented for propofol only anesthesia     Ready For Surgery From Anesthesia Perspective.     .

## 2024-03-11 NOTE — PROGRESS NOTES
Pt ambulated around unit.  Bilateral groin sites remained c/d/I without redness or swelling.  VSS.

## 2024-03-11 NOTE — SUBJECTIVE & OBJECTIVE
Past Medical History:   Diagnosis Date    CAD (coronary artery disease) dEC 2019    SYNERGY 3.5X12 RCA STENT WITH IMI; LATER MARCH 2019 D1 STENTED SARAHI 2.5X22 AND 2.5X8 OVERLAPPING    CHF (congestive heart failure)     GERD (gastroesophageal reflux disease)     Hypertension     Inferior MI 12/2018    stent synergy 3.5x12 RCA [place], [place]    Ischemic cardiomyopathy 11/4/2019    Low serum HDL 3/20/2019       Past Surgical History:   Procedure Laterality Date    CORONARY ANGIOPLASTY WITH STENT PLACEMENT      HERNIA REPAIR Left     INGUINAL    LEFT HEART CATHETERIZATION Left 3/18/2019    Procedure: Left heart cath;  Surgeon: Otilio Woody MD;  Location: [facility] CATH LAB;  Service: Cardiology;  Laterality: Left;    SD LEFT HEART CATH,PERCUTANEOUS  12/31/2018    Cardiac Cath, Left Heart       Review of patient's allergies indicates:  No Known Allergies    No current facility-administered medications on file prior to encounter.     Current Outpatient Medications on File Prior to Encounter   Medication Sig    bumetanide (BUMEX) 1 MG tablet as needed.    cetirizine (ZYRTEC) 10 MG tablet Take 10 mg by mouth once daily.    clopidogreL (PLAVIX) 75 mg tablet Take 75 mg by mouth.    DEXILANT 30 mg CpDM Take 30 mg by mouth once daily.    ezetimibe (ZETIA) 10 mg tablet Take 1 tablet (10 mg total) by mouth once daily.    FARXIGA 10 mg tablet TAKE ONE TABLET BY MOUTH ONCE DAILY    melatonin (MELATIN) 3 mg tablet Take 6 mg by mouth nightly as needed for Insomnia.    rosuvastatin (CRESTOR) 40 MG Tab Take 1 tablet (40 mg total) by mouth every evening.    sacubitriL-valsartan (ENTRESTO)  mg per tablet Take 1 tablet by mouth 2 (two) times daily.     Family History       Problem Relation (Age of Onset)    Cancer Father    Heart disease Father    Heart failure Father    Hypertension Brother    No Known Problems Mother          Tobacco Use    Smoking status: Former     Current packs/day: 0.00     Average packs/day: 1 pack/day  for 15.0 years (15.0 ttl pk-yrs)     Types: Cigarettes     Start date: 2003     Quit date: 2018     Years since quittin.2    Smokeless tobacco: Current     Types: Snuff   Substance and Sexual Activity    Alcohol use: Yes     Comment: 0.5 CASES BEER UP TO 2019    Drug use: No    Sexual activity: Yes     Partners: Female     Review of Systems   Constitutional: Negative for chills, fever and malaise/fatigue.   HENT:  Negative for congestion and nosebleeds.    Eyes:  Negative for blurred vision.   Cardiovascular:  Positive for irregular heartbeat and palpitations. Negative for chest pain, dyspnea on exertion, leg swelling, near-syncope, orthopnea, paroxysmal nocturnal dyspnea and syncope.   Respiratory:  Negative for cough, hemoptysis, shortness of breath, sleep disturbances due to breathing, sputum production and wheezing.    Endocrine: Negative for polyphagia.   Hematologic/Lymphatic: Negative for bleeding problem. Does not bruise/bleed easily.   Skin:  Negative for itching and rash.   Musculoskeletal:  Negative for back pain, joint swelling, muscle cramps and muscle weakness.   Gastrointestinal:  Negative for bloating, abdominal pain, hematemesis, hematochezia, nausea and vomiting.   Genitourinary:  Negative for dysuria and hematuria.   Neurological:  Negative for dizziness, focal weakness, headaches, light-headedness, loss of balance, numbness and weakness.   Psychiatric/Behavioral:  Negative for altered mental status.      Objective:     Vital Signs (Most Recent):  Temp: 98.6 °F (37 °C) (24)  Pulse: 80 (24)  Resp: 16 (24)  BP: 122/76 (24 0615)  SpO2: 98 % (24) Vital Signs (24h Range):  Temp:  [98.6 °F (37 °C)] 98.6 °F (37 °C)  Pulse:  [80] 80  Resp:  [16] 16  SpO2:  [98 %] 98 %  BP: (122-124)/(67-76) 122/76       Weight: 102.5 kg (226 lb)  Body mass index is 32.43 kg/m².    SpO2: 98 %        Physical Exam  Vitals and nursing note reviewed.    Constitutional:       General: He is not in acute distress.     Appearance: Normal appearance. He is well-developed. He is not diaphoretic.   HENT:      Head: Normocephalic and atraumatic.      Mouth/Throat:      Mouth: Mucous membranes are moist.      Pharynx: No oropharyngeal exudate.   Eyes:      Conjunctiva/sclera: Conjunctivae normal.      Pupils: Pupils are equal, round, and reactive to light.   Cardiovascular:      Rate and Rhythm: Normal rate and regular rhythm. No extrasystoles are present.     Pulses: Normal pulses and intact distal pulses.           Radial pulses are 2+ on the right side and 2+ on the left side.        Dorsalis pedis pulses are 2+ on the right side and 2+ on the left side.      Heart sounds: Normal heart sounds, S1 normal and S2 normal. No murmur heard.  Pulmonary:      Effort: Pulmonary effort is normal. No accessory muscle usage or respiratory distress.      Breath sounds: Normal breath sounds. No decreased breath sounds, wheezing, rhonchi or rales.   Chest:      Chest wall: No tenderness.   Abdominal:      General: Bowel sounds are normal. There is no distension.      Palpations: Abdomen is soft.      Tenderness: There is no abdominal tenderness. There is no guarding.   Musculoskeletal:         General: Normal range of motion.      Cervical back: Normal range of motion and neck supple.   Skin:     General: Skin is warm and dry.      Findings: No erythema or rash.   Neurological:      Mental Status: He is alert and oriented to person, place, and time. He is not disoriented.      Sensory: No sensory deficit.      Motor: No abnormal muscle tone.      Coordination: Coordination normal.      Gait: Gait normal.   Psychiatric:         Mood and Affect: Mood normal.         Behavior: Behavior normal.         Thought Content: Thought content normal.         Judgment: Judgment normal.            Significant Labs: Pre procedure labs from 3/4/24 reviewed    Significant Imaging:  ECG

## 2024-03-11 NOTE — HPI
Mr. Stephens is a 38 year-old male, patient of Dr. Kirk Hilton and Dr. Marshall Cevallos, with ischemic CMP with LVEF of 45%, inferior MI in 2018 s/p RCA PCI followed by an NSTEMI in 2019 s/p PCI to D1, and frequent PVCs.     History obtained through patient report and clinic notes:    Recently noted palpitations and worsening dyspnea on exertion. He was then undergoing an endoscopy and case was cancelled due to frequent PVCs. A holter monitor was done noting a 20% PVC burden. He is referred for consideration for mapping/ablation.     PET stress 1/2024: 25% fixed infarct basal to apical inferior, inferoseptal and inferolateral      ECGs in EPIC which show a narrow PVC with rS in V1 with a positive concordant transition starting in V2, superior rightward axis. However ECG from recent PET stress noted a PVC focus with left bundle, V3 transition, inferior leftward axis.      During his last office visit with Dr. Bernabe 2/8/24, Mr. Stephens is a pleasant 38 year-old man, patient of Dr. Kirk Hilton and Dr. Marshall Cevallos, with ischemic CMP with LVEF of 45%, inferior MI in 2018 s/p RCA PCI followed by an NSTEMI in 2019 s/p PCI to D1, and frequent PVCs. Possibly dimorphic. 1 PVC appears to be from the inferior LV basal septum. The other likely outflow tract. Discussed etiology and pathophysiology of PVCs. Discussed treatment indications and options. Discussed watchful monitoring of symptoms and LV function further versus dofetilide therapy with inpatient stay to initiate (as he is already on maximum metoprolol and would avoid amiodarone in a young pt) versus mapping and ablation. I spent about a half hour discussing the nature of EP study and ablation, including possible retrograde aortic access. We discussed risks and benefits at length. Our discussion included, but was not limited to the risk of death, infection, bleeding, stroke, MI, cardiac perforation, vascular injury, valvular injury, embolism, cardiac  tamponade, skin burns, and other organic injury including the possibility for need for surgery or pacemaker implantation. Discussed need to be actively having PVCs during the case to map and ablate. Discussed possibility that one of the PVC foci is coming from near the conduction system with risk of heart block. If mapped too close to the left sided conduction system discussed not ablating and discussing alternative strategies first. After a thorough discussion of options he elects for mapping/ablation.     Plan  PVC mapping/ablation  Carto  Anesthesia  Hold metoprolol 5 days prior  Hold Entresto AM of procedure  Likely will need interventional cardiology assistance    Mr. Stephens presents today to [facility] for scheduled PVC RFA with Dr. Bernabe. He denies any chest pain, palpitations, SOB, ORTIZ, dizziness, light headedness, weakness, syncope, or near syncopal episodes. He denies any bleeding, infections, fevers, rashes, or surgeries in the past 30 days. He is currently taking metoprolol succinate 200 mg daily (last dose taken 3/5/24), entresto (last dose taken yesterday PM), farxiga (last dose taken yesterday PM).     ECG today shows sinus rhythm with PVCs at 80 bpm  ms  ms QT/Qtc 398/459 ms.

## 2024-03-11 NOTE — H&P
[facility] - Short Stay Cardiac Unit  Cardiac Electrophysiology  History and Physical     Admission Date: 3/11/2024  Code Status: Prior   Attending Provider: Joseph Bernabe MD   Principal Problem:Premature ventricular contractions (PVCs) (VPCs)    Subjective:     Chief Complaint:  Premature ventricular contractions     HPI: Mr. Stephens is a 38 year-old male, patient of Dr. Kirk Hilton and Dr. Marshall Cevallos, with ischemic CMP with LVEF of 45%, inferior MI in 2018 s/p RCA PCI followed by an NSTEMI in 2019 s/p PCI to D1, and frequent PVCs.     History obtained through patient report and clinic notes:    Recently noted palpitations and worsening dyspnea on exertion. He was then undergoing an endoscopy and case was cancelled due to frequent PVCs. A holter monitor was done noting a 20% PVC burden. He is referred for consideration for mapping/ablation.     PET stress 1/2024: 25% fixed infarct basal to apical inferior, inferoseptal and inferolateral      ECGs in EPIC which show a narrow PVC with rS in V1 with a positive concordant transition starting in V2, superior rightward axis. However ECG from recent PET stress noted a PVC focus with left bundle, V3 transition, inferior leftward axis.      During his last office visit with Dr. Bernabe 2/8/24, frequent PVCs. Possibly dimorphic. 1 PVC appears to be from the inferior LV basal septum. The other likely outflow tract. Discussed etiology and pathophysiology of PVCs. Discussed treatment indications and options. Discussed watchful monitoring of symptoms and LV function further versus dofetilide therapy with inpatient stay to initiate (as he is already on maximum metoprolol and would avoid amiodarone in a young pt) versus mapping and ablation. I spent about a half hour discussing the nature of EP study and ablation, including possible retrograde aortic access. We discussed risks and benefits at length. Our discussion included, but was not limited to the risk of death,  infection, bleeding, stroke, MI, cardiac perforation, vascular injury, valvular injury, embolism, cardiac tamponade, skin burns, and other organic injury including the possibility for need for surgery or pacemaker implantation. Discussed need to be actively having PVCs during the case to map and ablate. Discussed possibility that one of the PVC foci is coming from near the conduction system with risk of heart block. If mapped too close to the left sided conduction system discussed not ablating and discussing alternative strategies first. After a thorough discussion of options he elects for mapping/ablation.     Plan  PVC mapping/ablation  Carto  Anesthesia  Hold metoprolol 5 days prior  Hold Entresto AM of procedure  Likely will need interventional cardiology assistance    Mr. Stephens presents today to [facility] for scheduled PVC RFA with Dr. Bernabe. He denies any chest pain, SOB, ORTIZ, dizziness, light headedness, weakness, syncope, or near syncopal episodes. He does note frequent palpitations. He denies any bleeding, infections, fevers, rashes, or surgeries in the past 30 days. He is currently taking metoprolol succinate 200 mg daily (last dose taken 3/5/24), entresto (last dose taken yesterday PM), farxiga (last dose taken yesterday PM).     ECG today shows sinus rhythm with PVCs at 80 bpm  ms  ms QT/Qtc 398/459 ms.    Past Medical History:   Diagnosis Date    CAD (coronary artery disease) dEC 2019    SYNERGY 3.5X12 RCA STENT WITH IMI; LATER MARCH 2019 D1 STENTED SARAHI 2.5X22 AND 2.5X8 OVERLAPPING    CHF (congestive heart failure)     GERD (gastroesophageal reflux disease)     Hypertension     Inferior MI 12/2018    stent synergy 3.5x12 RCA [place]    Ischemic cardiomyopathy 11/4/2019    Low serum HDL 3/20/2019       Past Surgical History:   Procedure Laterality Date    CORONARY ANGIOPLASTY WITH STENT PLACEMENT      HERNIA REPAIR Left     INGUINAL    LEFT HEART CATHETERIZATION Left 3/18/2019     Procedure: Left heart cath;  Surgeon: Otilio Woody MD;  [facility] CATH LAB;  Service: Cardiology;  Laterality: Left;    [place] LEFT HEART CATH,PERCUTANEOUS  2018    Cardiac Cath, Left Heart       Review of patient's allergies indicates:  No Known Allergies    No current facility-administered medications on file prior to encounter.     Current Outpatient Medications on File Prior to Encounter   Medication Sig    bumetanide (BUMEX) 1 MG tablet as needed.    cetirizine (ZYRTEC) 10 MG tablet Take 10 mg by mouth once daily.    clopidogreL (PLAVIX) 75 mg tablet Take 75 mg by mouth.    DEXILANT 30 mg CpDM Take 30 mg by mouth once daily.    ezetimibe (ZETIA) 10 mg tablet Take 1 tablet (10 mg total) by mouth once daily.    FARXIGA 10 mg tablet TAKE ONE TABLET BY MOUTH ONCE DAILY    melatonin (MELATIN) 3 mg tablet Take 6 mg by mouth nightly as needed for Insomnia.    rosuvastatin (CRESTOR) 40 MG Tab Take 1 tablet (40 mg total) by mouth every evening.    sacubitriL-valsartan (ENTRESTO)  mg per tablet Take 1 tablet by mouth 2 (two) times daily.     Family History       Problem Relation (Age of Onset)    Cancer Father    Heart disease Father    Heart failure Father    Hypertension Brother    No Known Problems Mother          Tobacco Use    Smoking status: Former     Current packs/day: 0.00     Average packs/day: 1 pack/day for 15.0 years (15.0 ttl pk-yrs)     Types: Cigarettes     Start date: 2003     Quit date: 2018     Years since quittin.2    Smokeless tobacco: Current     Types: Snuff   Substance and Sexual Activity    Alcohol use: Yes     Comment: 0.5 CASES BEER UP TO 2019    Drug use: No    Sexual activity: Yes     Partners: Female     Review of Systems   Constitutional: Negative for chills, fever and malaise/fatigue.   HENT:  Negative for congestion and nosebleeds.    Eyes:  Negative for blurred vision.   Cardiovascular:  Positive for irregular heartbeat and palpitations. Negative  for chest pain, dyspnea on exertion, leg swelling, near-syncope, orthopnea, paroxysmal nocturnal dyspnea and syncope.   Respiratory:  Negative for cough, hemoptysis, shortness of breath, sleep disturbances due to breathing, sputum production and wheezing.    Endocrine: Negative for polyphagia.   Hematologic/Lymphatic: Negative for bleeding problem. Does not bruise/bleed easily.   Skin:  Negative for itching and rash.   Musculoskeletal:  Negative for back pain, joint swelling, muscle cramps and muscle weakness.   Gastrointestinal:  Negative for bloating, abdominal pain, hematemesis, hematochezia, nausea and vomiting.   Genitourinary:  Negative for dysuria and hematuria.   Neurological:  Negative for dizziness, focal weakness, headaches, light-headedness, loss of balance, numbness and weakness.   Psychiatric/Behavioral:  Negative for altered mental status.      Objective:     Vital Signs (Most Recent):  Temp: 98.6 °F (37 °C) (03/11/24 0601)  Pulse: 80 (03/11/24 0601)  Resp: 16 (03/11/24 0601)  BP: 122/76 (03/11/24 0615)  SpO2: 98 % (03/11/24 0601) Vital Signs (24h Range):  Temp:  [98.6 °F (37 °C)] 98.6 °F (37 °C)  Pulse:  [80] 80  Resp:  [16] 16  SpO2:  [98 %] 98 %  BP: (122-124)/(67-76) 122/76       Weight: 102.5 kg (226 lb)  Body mass index is 32.43 kg/m².    SpO2: 98 %        Physical Exam  Vitals and nursing note reviewed.   Constitutional:       General: He is not in acute distress.     Appearance: Normal appearance. He is well-developed. He is not diaphoretic.   HENT:      Head: Normocephalic and atraumatic.      Mouth/Throat:      Mouth: Mucous membranes are moist.      Pharynx: No oropharyngeal exudate.   Eyes:      Conjunctiva/sclera: Conjunctivae normal.      Pupils: Pupils are equal, round, and reactive to light.   Cardiovascular:      Rate and Rhythm: Normal rate and regular rhythm.      Pulses: Normal pulses and intact distal pulses.           Radial pulses are 2+ on the right side and 2+ on the left  side.        Dorsalis pedis pulses are 2+ on the right side and 2+ on the left side.      Heart sounds: Normal heart sounds, S1 normal and S2 normal. No murmur heard.  Pulmonary:      Effort: Pulmonary effort is normal. No accessory muscle usage or respiratory distress.      Breath sounds: Normal breath sounds. No decreased breath sounds, wheezing, rhonchi or rales.   Chest:      Chest wall: No tenderness.   Abdominal:      General: Bowel sounds are normal. There is no distension.      Palpations: Abdomen is soft.      Tenderness: There is no abdominal tenderness. There is no guarding.   Musculoskeletal:         General: Normal range of motion.      Cervical back: Normal range of motion and neck supple.   Skin:     General: Skin is warm and dry.      Findings: No erythema or rash.   Neurological:      Mental Status: He is alert and oriented to person, place, and time. He is not disoriented.      Sensory: No sensory deficit.      Motor: No abnormal muscle tone.      Coordination: Coordination normal.      Gait: Gait normal.   Psychiatric:         Mood and Affect: Mood normal.         Behavior: Behavior normal.         Thought Content: Thought content normal.         Judgment: Judgment normal.     Significant Labs: Pre procedure labs from 3/4/24 reviewed    Significant Imaging:  ECG  today shows sinus rhythm with PVCs at 80 bpm  ms  ms QT/Qtc 398/459 ms.  Assessment and Plan:   Plan:  -PVC ablation  -Anesthesia for sedation    Dr. Bernabe at bedside. Discussed etiology and pathophysiology of PVCs. Discussed treatment indications and options. Discussed watchful monitoring of symptoms and LV function further versus dofetilide therapy with inpatient stay to initiate (as he is already on maximum metoprolol and would avoid amiodarone in a young pt) versus mapping and ablation. I spent about a half hour discussing the nature of EP study and ablation, including possible retrograde aortic access. We discussed  risks and benefits at length. Our discussion included, but was not limited to the risk of death, infection, bleeding, stroke, MI, cardiac perforation, vascular injury, valvular injury, embolism, cardiac tamponade, skin burns, and other organic injury including the possibility for need for surgery or pacemaker implantation. Discussed need to be actively having PVCs during the case to map and ablate. Discussed possibility that one of the PVC foci is coming from near the conduction system with risk of heart block. If mapped too close to the left sided conduction system discussed not ablating and discussing alternative strategies first. After a thorough discussion of options he elects for mapping/ablation. All consents signed.    Serenity Wing NP  Cardiac Electrophysiology  [facility] - [facility]    Attending: Joseph Bernabe MD

## 2024-03-11 NOTE — Clinical Note
A venogram was performed in the right femoral artery. The vessel was injected via hand injection  with 5 mL of contrast.

## 2024-03-11 NOTE — DISCHARGE SUMMARY
[facility] - Short Stay Cardiac Unit  Cardiac Electrophysiology  Discharge Summary      Patient Name: Juan Stephens  MRN: [id number]  Admission Date: 3/11/2024  Hospital Length of Stay: 0 days  Discharge Date and Time: 3/11/2024  4:36 PM  Attending Physician: Joseph Bernabe MD  Discharging Provider: Serenity Wing NP  Primary Care Physician: Sanjuanita Smith MD    HPI: Mr. Stephens is a 38 year-old male, patient of Dr. Kirk Hilton and Dr. Marshall Cevallos, with ischemic CMP with LVEF of 45%, inferior MI in 2018 s/p RCA PCI followed by an NSTEMI in 2019 s/p PCI to D1, and frequent PVCs.      History obtained through patient report and clinic notes:     Recently noted palpitations and worsening dyspnea on exertion. He was then undergoing an endoscopy and case was cancelled due to frequent PVCs. A holter monitor was done noting a 20% PVC burden. He is referred for consideration for mapping/ablation.     PET stress 1/2024: 25% fixed infarct basal to apical inferior, inferoseptal and inferolateral      ECGs in EPIC which show a narrow PVC with rS in V1 with a positive concordant transition starting in V2, superior rightward axis. However ECG from recent PET stress noted a PVC focus with left bundle, V3 transition, inferior leftward axis.      During his last office visit with Dr. Bernabe 2/8/24, frequent PVCs. Possibly dimorphic. 1 PVC appears to be from the inferior LV basal septum. The other likely outflow tract. Discussed etiology and pathophysiology of PVCs. Discussed treatment indications and options. Discussed watchful monitoring of symptoms and LV function further versus dofetilide therapy with inpatient stay to initiate (as he is already on maximum metoprolol and would avoid amiodarone in a young pt) versus mapping and ablation. I spent about a half hour discussing the nature of EP study and ablation, including possible retrograde aortic access. We discussed risks and benefits at length. Our discussion  included, but was not limited to the risk of death, infection, bleeding, stroke, MI, cardiac perforation, vascular injury, valvular injury, embolism, cardiac tamponade, skin burns, and other organic injury including the possibility for need for surgery or pacemaker implantation. Discussed need to be actively having PVCs during the case to map and ablate. Discussed possibility that one of the PVC foci is coming from near the conduction system with risk of heart block. If mapped too close to the left sided conduction system discussed not ablating and discussing alternative strategies first. After a thorough discussion of options he elects for mapping/ablation.     Plan  PVC mapping/ablation  Carto  Anesthesia  Hold metoprolol 5 days prior  Hold Entresto AM of procedure  Likely will need interventional cardiology assistance     Mr. Stephens presents today to [facility] for scheduled PVC RFA with Dr. Bernabe. He denies any chest pain, SOB, ORTIZ, dizziness, light headedness, weakness, syncope, or near syncopal episodes. He does note frequent palpitations. He denies any bleeding, infections, fevers, rashes, or surgeries in the past 30 days. He is currently taking metoprolol succinate 200 mg daily (last dose taken 3/5/24), entresto (last dose taken yesterday PM), farxiga (last dose taken yesterday PM).      ECG today shows sinus rhythm with PVCs at 80 bpm  ms  ms QT/Qtc 398/459 ms.  Procedure(s) (LRB):  Ablation (N/A)  Placement of Closure Device     Indwelling Lines/Drains at time of discharge:  Lines/Drains/Airways    None    Hospital Course: Patient underwent successful PVC ablation (see procedure note for details), tolerated procedure well with no acute complications. Admitted to PACU, post-procedure ECG showed sinus rhythm at 93 bpm  ms  ms QT/QTc 366/455 ms. Bilateral groin sites with sutures removed, dressings C/D/I. No bleeding, hematoma, or bruit noted. Bedrest completed x 6 hours. He was  monitored on telemetry, no acute arrhythmias.   Patient ambulating, tolerating PO intake, and voiding with no issues. Denies any chest pain or SOB. Mr. Stephens did complain of some tingling to the top of his right foot and toes. Dorsalis pedis and posterior tibial pulses are both 2+. Normal coloration and capillary refill. Negative for any foot or LE swelling. Groin sites C/D/I. No bleeding, hematoma, or bruit noted. Patient feels the tingling is improving as he ambulated and may have been due to position in the bed. Patient feels comfortable to be discharged. Discussed with Dr. Bernabe who cleared patient for discharge. Instructed patient and wife to monitor for any worsening sensation to foot, changes in coloring or perfusion, pain, or swelling. If noted, he knows to seek medical care. Discharge plans discussed with Dr. Bernabe. Patient to continue all home medications. Holter monitor in 4 weeks. Follow up with Dr. Bernabe in 6 weeks. Mr. Stephens was assessed at bedside prior to discharge. He reported feeling well and denied chest discomfort, shortness of breath, palpitations, lightheadedness, or any other acute symptoms. Discharge plans/instructions discussed with patient and family who verbalized understanding and agreement of plans of care. No further questions or concerns voiced at this time. He was discharged home in stable condition.     Physical Exam:   Gen: No acute distress, pleasant patient answering questions appropriately  CVS: Regular rate and rhythm, S1S2 normal, no murmurs/rubs/gallops  Chest: Clear to auscultation bilaterally, no wheezes/rales/ronchi  ABD: Soft, non-tender, nondistended, bowel sounds present  Groins:Bilateral groin sites soft, non tender, no bleeding, no swelling, no hematoma   Neurologic: Normal strength and tone. No focal numbness or weakness.   Psychiatric: Normal mood and affect. Behavior is normal. Alert and oriented X 3.  EXT: Dorsalis pedis and posterior tibial pulses are  "both 2+. Normal coloration and capillary refill. Negative for any foot or LE swelling.     Goals of Care Treatment Preferences:  Code Status: Full Code    Consults: none     Significant Diagnostic Studies: N/A    Final Active Diagnoses:    Diagnosis Date Noted POA    PRINCIPAL PROBLEM:  Premature ventricular contractions (PVCs) (VPCs) [I49.3] 12/19/2023 Yes      Problems Resolved During this Admission:     Discharged Condition: stable    Disposition: Home or Self Care    Follow Up:   Follow-up Information       Joseph Bernabe MD Follow up in 3 month(s).    Specialties: Electrophysiology, Cardiology  Why: Post ablation  Contact information:  [id number]  [facility] [address]  [phone]                           Patient Instructions:      Other restrictions (specify):   Order Comments: Ablation Discharge Instructions and Care of Your Groin      Follow up:  · Holter monitor-this will be mailed to you.  · Follow up with Dr. Bernabe in 3 months.    Medications:  · Continue all home medications including dexilant.  · You may experience chest discomfort (also known as "pericarditis") with deep breathes, coughing, and/or laying down which is typically normal following your procedure. If this occurs, you can take ibuprofen (Motrin) 800 mg every 8 hours for 2-3 days. If the chest pain is persistent or severe please visit the nearest emergency department.    Groin site management, precautions, and restrictions:  · Remove the bandages over your groin area the morning after your procedure. You can shower after you remove these bandages. Wash the sites at least once daily with soap and water. Keep the groin sites clean and dry. You do not need to apply ointments or bandages to the area.   · Wear loose clothes and loose underwear.  · Do not take a bath or submerge your groin area (for example: Jacuzzi, swimming pool, or lake) or at least 1 week or when the puncture sites in your groin have completely healed.     If " bleeding should occur at the groin sites following discharge:  · If oozing from groin site occurs, lay down and apply pressure to the puncture site with your fingers without letting up for 15 minutes and lay flat for 1 hour. If bleeding has resolved, you can continue to monitor. If the bleeding continues or there is significant swelling or pain in the groin area, please call 911 immediately and visit the nearest ER for evaluation and treatment. Do not drive yourself to the hospital. DO NOT STOP TAKING YOUR BLOOD THINNER UNLESS INSTRUCTED BY A PHYSICIAN.     Activity Instructions:  · Do not drive or operate any dangerous machinery for 24 hours, but optimally 48-72 hours since you were given general anesthesia.   · Do not strain during bowel movements for the first 3 to 4 days after the procedure to prevent bleeding from the groin sites.  · Avoid heavy lifting (more than 10 pounds) and pushing or pulling heavy objects for the first 5 to 7 days after the procedure.  · Do not participate in strenuous activities for 5 days after the procedure. This includes most sports - jogging, golfing, play tennis, and bowling.  · You may climb stairs if needed, but walk up and down the stairs more slowly than usual.  · Gradually increase your activities until you reach your normal activity level within one week after the procedure.    Go to the Emergency Department if you develop:   · Change in color or temperature of the leg  · Redness, warmth, or drainage/pus at the groin sites  · Chills or fever greater than 101.0 F   · Severe bleeding or swelling at the groin sites  · Acute Weakness or numbness   · Visual, gait or speech disturbances   · New chest pain, palpitations, shortness of breath, fainting     Lifting restrictions     No driving until:   Order Comments: No driving or operating heavy machinery for 24-48 hours after your procedure because you received sedation.     Notify your health care provider if you experience any of  the following:  increased confusion or weakness     Notify your health care provider if you experience any of the following:  persistent dizziness, light-headedness, or visual disturbances     Notify your health care provider if you experience any of the following:  worsening rash     Notify your health care provider if you experience any of the following:  severe persistent headache     Notify your health care provider if you experience any of the following:  difficulty breathing or increased cough     Notify your health care provider if you experience any of the following:  redness, tenderness, or signs of infection (pain, swelling, redness, odor or green/yellow discharge around incision site)     Notify your health care provider if you experience any of the following:  severe uncontrolled pain     Notify your health care provider if you experience any of the following:  persistent nausea and vomiting or diarrhea     Notify your health care provider if you experience any of the following:  temperature >100.4     Remove dressing in 24 hours     Weight bearing restrictions (specify):     Shower on day dressing removed (No bath)     Medications:  Reconciled Home Medications:      Medication List        CONTINUE taking these medications      bumetanide 1 MG tablet  Commonly known as: BUMEX  as needed.     cetirizine 10 MG tablet  Commonly known as: ZYRTEC  Take 10 mg by mouth once daily.     clopidogreL 75 mg tablet  Commonly known as: PLAVIX  Take 75 mg by mouth.     DEXILANT 30 mg Cpdm  Generic drug: dexlansoprazole  Take 30 mg by mouth once daily.     ENTRESTO  mg per tablet  Generic drug: sacubitriL-valsartan  Take 1 tablet by mouth 2 (two) times daily.     ezetimibe 10 mg tablet  Commonly known as: ZETIA  Take 1 tablet (10 mg total) by mouth once daily.     FARXIGA 10 mg tablet  Generic drug: dapagliflozin propanediol  TAKE ONE TABLET BY MOUTH ONCE DAILY     melatonin 3 mg tablet  Commonly known as:  MELATIN  Take 6 mg by mouth nightly as needed for Insomnia.     metoprolol succinate 200 MG 24 hr tablet  Commonly known as: TOPROL-XL  TAKE ONE TABLET BY MOUTH ONCE DAILY     rosuvastatin 40 MG Tab  Commonly known as: CRESTOR  Take 1 tablet (40 mg total) by mouth every evening.            Plan:  -Continue all home medications  -Holter monitor in 4 weeks  -Follow up with Dr. Bernabe in 6 weeks virtually    Time spent on the discharge of patient: 30 minutes    Serenity Wing NP  Cardiac Electrophysiology  [facility] - Arrhythmia    Attending: Joseph Bernabe MD

## 2024-03-11 NOTE — PROGRESS NOTES
Pt is AAOx3 and in no apparent distress.  Provided a copy of discharge instructions.  Teaching performed.  Pt verbalized understanding and denied any questions.  PIV d/c catheter tip intact.  2x2 applied and no active bleeding noted.  Provided family with wheelchair and they are escorting pt to garage for discharge home.

## 2024-03-11 NOTE — ANESTHESIA POSTPROCEDURE EVALUATION
Anesthesia Post Evaluation    Patient: Juan Stephens    Procedure(s) Performed: Procedure(s) (LRB):  Ablation (N/A)  Placement of Closure Device    Final Anesthesia Type: general      Patient location during evaluation: PACU  Patient participation: Yes- Able to Participate  Level of consciousness: awake and alert and oriented  Post-procedure vital signs: reviewed and stable  Pain management: adequate  Airway patency: patent  KALIA mitigation strategies: Intraoperative administration of CPAP, nasopharyngeal airway, or oral appliance during sedation and Multimodal analgesia  PONV status at discharge: No PONV  Anesthetic complications: no      Cardiovascular status: hemodynamically stable  Respiratory status: unassisted  Hydration status: euvolemic  Follow-up not needed.              Vitals Value Taken Time   /90 03/11/24 1326   Temp 37 °C (98.6 °F) 03/11/24 1300   Pulse 77 03/11/24 1326   Resp 18 03/11/24 1326   SpO2 98 % 03/11/24 1326         No case tracking events are documented in the log.      Pain/Lorna Score: Pain Rating Prior to Med Admin: 3 (3/11/2024 10:53 AM)  Pain Rating Post Med Admin: 2 (3/11/2024 11:30 AM)  Lorna Score: 10 (3/11/2024  1:26 PM)

## 2024-03-11 NOTE — PROGRESS NOTES
Pt complaining of some tingling to the toes on his R foot.  No swelling to feet.  Dorsalis pedis and posterior tibial pulses are both 2+.  Normal capillary refill to toenails.  Groin site has remained c/d/I without redness or swelling.  Notified Mark BRIONES and Dr. Bernabe; Dr. Bernabe cleared pt for discharge.

## 2024-03-11 NOTE — PROGRESS NOTES
"Upon arrival to ep pacu 1, pt complaints of "need to void".  Offered urinal or condom cath, bedrest x6hrs.  Per pt, "I would prefer a condom cath".  Condom cath in place. Pt tolerated well, drainage bag intact. Pt notified ok to go to the bathroom by ep pacu rn.   "

## 2024-03-11 NOTE — PROGRESS NOTES
Dr. Bernabe at bedside, updating pt on procedure. Pt aaox4 follows commands. Verbalizes understanding.

## 2024-03-11 NOTE — PLAN OF CARE
"Vss. Sr noted on cm. S/p ablation pvc. Pt arrived to ep pacu 1 with aleks groin sutures/stopcock x1 each groin. Placed at 1010, removal time 1410, bedrest done at 1610. Right groin arterial stick arrived cj well approx perclose x1. Hemostasis 1010.  Guaze/trans film placed over site by ep pacu rn. No hematoma or bleeding noted to aleks groins. Condom cath in place per pt request. Clear yellow urine noted. See I/o flowsheet.  Palpable pulses noted. Pt complaints of "soreness to right groin" prn tylenol given per md order. At this time "tolerable". 12 lead EKG done and in chart. Dr lujan updated pt in ep pacu 1. Verbalizes understanding. Pt's wife able to see pt in ep pacu 1. Pt has cell phone. See flowsheet for full assessment.   "

## 2024-03-11 NOTE — PROGRESS NOTES
Pt returned to unit AAOx4 and denies pain.  VSS.  Bilateral groin sites are c/d/I without redness or swelling.  Family called to bedside.  Post procedure protocol reviewed.  Will continue to monitor.

## 2024-03-11 NOTE — DISCHARGE INSTRUCTIONS
"Ablation Discharge Instructions and Care of Your Groin      Follow up:  Holter monitor-this will be mailed to you.  Follow up with Dr. Bernabe in 3 months.    Medications:  Continue all home medications including dexilant.  You may experience chest discomfort (also known as "pericarditis") with deep breathes, coughing, and/or laying down which is typically normal following your procedure. If this occurs, you can take ibuprofen (Motrin) 800 mg every 8 hours for 2-3 days. If the chest pain is persistent or severe please visit the nearest emergency department.    Groin site management, precautions, and restrictions:  Remove the bandages over your groin area the morning after your procedure. You can shower after you remove these bandages. Wash the sites at least once daily with soap and water. Keep the groin sites clean and dry. You do not need to apply ointments or bandages to the area.   Wear loose clothes and loose underwear.  Do not take a bath or submerge your groin area (for example: Jacuzzi, swimming pool, or lake) or at least 1 week or when the puncture sites in your groin have completely healed.     If bleeding should occur at the groin sites following discharge:  If oozing from groin site occurs, lay down and apply pressure to the puncture site with your fingers without letting up for 15 minutes and lay flat for 1 hour. If bleeding has resolved, you can continue to monitor. If the bleeding continues or there is significant swelling or pain in the groin area, please call 911 immediately and visit the nearest ER for evaluation and treatment. Do not drive yourself to the hospital. DO NOT STOP TAKING YOUR BLOOD THINNER UNLESS INSTRUCTED BY A PHYSICIAN.     Activity Instructions:  Do not drive or operate any dangerous machinery for 24 hours, but optimally 48-72 hours since you were given general anesthesia.   Do not strain during bowel movements for the first 3 to 4 days after the procedure to prevent bleeding from " the groin sites.  Avoid heavy lifting (more than 10 pounds) and pushing or pulling heavy objects for the first 5 to 7 days after the procedure.  Do not participate in strenuous activities for 5 days after the procedure. This includes most sports - jogging, golfing, play tennis, and bowling.  You may climb stairs if needed, but walk up and down the stairs more slowly than usual.  Gradually increase your activities until you reach your normal activity level within one week after the procedure.    Go to the Emergency Department if you develop:   Change in color or temperature of the leg  Redness, warmth, or drainage/pus at the groin sites  Chills or fever greater than 101.0 F   Severe bleeding or swelling at the groin sites  Acute Weakness or numbness   Visual, gait or speech disturbances   New chest pain, palpitations, shortness of breath, fainting

## 2024-03-11 NOTE — PLAN OF CARE
Bilateral groin sutures removed.  Gauze and tegaderm dressings in place.  Sites c/d/I without redness or swelling.  Will continue to monitor.

## 2024-04-11 ENCOUNTER — CLINICAL SUPPORT (OUTPATIENT)
Dept: CARDIOLOGY | Facility: HOSPITAL | Age: 39
End: 2024-04-11
Attending: INTERNAL MEDICINE
Payer: MEDICAID

## 2024-04-11 PROCEDURE — 93244 EXT ECG>48HR<7D REV&INTERPJ: CPT | Mod: ,,, | Performed by: INTERNAL MEDICINE

## 2024-04-15 ENCOUNTER — PATIENT MESSAGE (OUTPATIENT)
Dept: ELECTROPHYSIOLOGY | Facility: CLINIC | Age: 39
End: 2024-04-15
Payer: MEDICAID

## 2024-05-10 ENCOUNTER — TELEPHONE (OUTPATIENT)
Dept: ELECTROPHYSIOLOGY | Facility: CLINIC | Age: 39
End: 2024-05-10
Payer: MEDICAID

## 2024-05-10 ENCOUNTER — PATIENT MESSAGE (OUTPATIENT)
Dept: ELECTROPHYSIOLOGY | Facility: CLINIC | Age: 39
End: 2024-05-10
Payer: MEDICAID

## 2024-05-10 NOTE — TELEPHONE ENCOUNTER
Spoke with pt and wife and notified of results.    ----- Message from Joseph Bernabe MD sent at 5/9/2024  7:36 AM CDT -----  Regarding: FW:  Please let Juan know the monitor showed his PVC burden is significantly reduced post ablation. It was 20% and is now 4%  ----- Message -----  From: Joseph Bernabe MD  Sent: 5/6/2024   7:52 AM CDT  To: Joseph Bernabe MD

## 2024-05-10 NOTE — TELEPHONE ENCOUNTER
Attempted to call pt re monitor results, no answer. Left a message for pt to return call to discuss.

## 2024-05-26 DIAGNOSIS — I50.42 CHRONIC COMBINED SYSTOLIC AND DIASTOLIC CONGESTIVE HEART FAILURE: ICD-10-CM

## 2024-05-27 ENCOUNTER — TELEPHONE (OUTPATIENT)
Dept: ELECTROPHYSIOLOGY | Facility: CLINIC | Age: 39
End: 2024-05-27
Payer: MEDICAID

## 2024-05-28 ENCOUNTER — OFFICE VISIT (OUTPATIENT)
Dept: ELECTROPHYSIOLOGY | Facility: CLINIC | Age: 39
End: 2024-05-28
Payer: MEDICAID

## 2024-05-28 VITALS — WEIGHT: 220 LBS | BODY MASS INDEX: 31.5 KG/M2 | HEIGHT: 70 IN

## 2024-05-28 DIAGNOSIS — G47.33 OSA (OBSTRUCTIVE SLEEP APNEA): ICD-10-CM

## 2024-05-28 DIAGNOSIS — I10 ESSENTIAL HYPERTENSION: ICD-10-CM

## 2024-05-28 DIAGNOSIS — I49.3 PREMATURE VENTRICULAR CONTRACTIONS (PVCS) (VPCS): ICD-10-CM

## 2024-05-28 DIAGNOSIS — I21.19 INFERIOR MI: ICD-10-CM

## 2024-05-28 DIAGNOSIS — I50.22 HEART FAILURE, SYSTOLIC, CHRONIC: ICD-10-CM

## 2024-05-28 DIAGNOSIS — I25.5 ISCHEMIC CARDIOMYOPATHY: Primary | ICD-10-CM

## 2024-05-28 PROCEDURE — 1159F MED LIST DOCD IN RCRD: CPT | Mod: CPTII,95,, | Performed by: INTERNAL MEDICINE

## 2024-05-28 PROCEDURE — 1160F RVW MEDS BY RX/DR IN RCRD: CPT | Mod: CPTII,95,, | Performed by: INTERNAL MEDICINE

## 2024-05-28 PROCEDURE — 99213 OFFICE O/P EST LOW 20 MIN: CPT | Mod: 95,,, | Performed by: INTERNAL MEDICINE

## 2024-05-28 PROCEDURE — 4010F ACE/ARB THERAPY RXD/TAKEN: CPT | Mod: CPTII,95,, | Performed by: INTERNAL MEDICINE

## 2024-05-28 PROCEDURE — 3008F BODY MASS INDEX DOCD: CPT | Mod: CPTII,95,, | Performed by: INTERNAL MEDICINE

## 2024-05-28 RX ORDER — DAPAGLIFLOZIN 10 MG/1
TABLET, FILM COATED ORAL
Qty: 30 TABLET | Refills: 6 | Status: SHIPPED | OUTPATIENT
Start: 2024-05-28

## 2024-05-28 NOTE — PROGRESS NOTES
Subjective:   Patient ID:  Juan Stephens is a 38 y.o. male who presents for evaluation of PVCs    The patient location is: [place], [place]  The chief complaint leading to consultation is: PVCs    Visit type: audiovisual    Face to Face time with patient: 3 minutes  10 minutes of total time spent on the encounter, which includes face to face time and non-face to face time preparing to see the patient (eg, review of tests), Obtaining and/or reviewing separately obtained history, Documenting clinical information in the electronic or other health record, Independently interpreting results (not separately reported) and communicating results to the patient/family/caregiver, or Care coordination (not separately reported).         Each patient to whom he or she provides medical services by telemedicine is:  (1) informed of the relationship between the physician and patient and the respective role of any other health care provider with respect to management of the patient; and (2) notified that he or she may decline to receive medical services by telemedicine and may withdraw from such care at any time.    Notes:     Referring Electrophysiologist: Kirk Hilton MD  Primary Cardiologist: Marshall Cevallos MD    HPI  Prior Hx:  I had the pleasure of seeing Mr. Stephens today in our electrophysiology clinic in consultation for his frequent PVCs. As you are aware he is a pleasant 38 year-old man, patient of Dr. Kirk Hilton and Dr. Marshall Cevallos, with ischemic CMP with LVEF of 45%, inferior MI in 2018 s/p RCA PCI followed by an NSTEMI in 2019 s/p PCI to D1, and frequent PVCs. Recently noted palpitations and worsening dyspnea on exertion. He was then undergoing an endoscopy and case was cancelled due to frequent PVCs. A holter monitor was done noting a 20% PVC burden. He is referred for consideration for mapping/ablation.    PET stress 1/2024: 25% fixed infarct basal to apical inferior, inferoseptal and inferolateral     I  reviewed available ECGs in EPIC which show a narrow PVC with rS in V1 with a positive concordant transition starting in V2, superior rightward axis. However ECG from recent PET stress noted a PVC focus with left bundle, V3 transition, inferior leftward axis.     3/11/2024: PVC mapping and ablation and LV inferobasal septum    Interim Hx:  Mr. Stephens returns for follow-up. Post-ablation holter monitor noted 4% PVC burden (dimorphic PVCs on prior monitoring, most frequent PVC mapped/ablated). He feels well. Feels much better.       Review of Systems   Constitutional: Negative for fever and malaise/fatigue.   HENT:  Negative for congestion and sore throat.    Eyes:  Negative for blurred vision and visual disturbance.   Cardiovascular:  Negative for chest pain, dyspnea on exertion, irregular heartbeat, near-syncope, palpitations and syncope.   Respiratory:  Negative for cough and shortness of breath.    Hematologic/Lymphatic: Negative for bleeding problem. Does not bruise/bleed easily.   Skin: Negative.    Musculoskeletal: Negative.    Gastrointestinal:  Negative for bloating, abdominal pain, hematochezia and melena.   Neurological:  Negative for focal weakness and weakness.   Psychiatric/Behavioral: Negative.         Objective:   Physical Exam  Constitutional:       Appearance: Normal appearance.   Neurological:      Mental Status: He is alert and oriented to person, place, and time. Mental status is at baseline.   Psychiatric:         Mood and Affect: Mood normal.         Behavior: Behavior normal.         Thought Content: Thought content normal.         Judgment: Judgment normal.         Assessment:      1. Ischemic cardiomyopathy    2. Premature ventricular contractions (PVCs) (VPCs)    3. Heart failure, systolic, chronic    4. Essential hypertension    5. Inferior MI    6. KALIA (obstructive sleep apnea)        Plan:     In summary, Mr. Stephens is a pleasant 38 year-old man, patient of Dr. Kirk Hilton and   Marshall Cevallos, with ischemic CMP with LVEF of 45%, inferior MI in 2018 s/p RCA PCI followed by an NSTEMI in 2019 s/p PCI to D1, and frequent PVCs s/p mapping/ablation of the predominant PVC focus at the LV inferobasal septum. PVC burden reduced to 4%. Discussed at this burden it should have no effect on his LV function. RTC in 1 year with holter monitor.    Thank you for allowing me to participate in the care of this patient. Please do not hesitate to call me with any questions or concerns.    Joseph Bernabe MD, PhD  Cardiac Electrophysiology

## 2024-06-18 RX ORDER — ROSUVASTATIN CALCIUM 40 MG/1
40 TABLET, COATED ORAL NIGHTLY
Qty: 90 TABLET | Refills: 3 | Status: SHIPPED | OUTPATIENT
Start: 2024-06-18 | End: 2025-06-18

## 2024-07-19 ENCOUNTER — PATIENT MESSAGE (OUTPATIENT)
Dept: TRANSPLANT | Facility: CLINIC | Age: 39
End: 2024-07-19
Payer: MEDICAID

## 2024-07-22 DIAGNOSIS — R74.8 LOW SERUM HDL: ICD-10-CM

## 2024-07-22 DIAGNOSIS — I50.42 CHRONIC COMBINED SYSTOLIC AND DIASTOLIC CONGESTIVE HEART FAILURE: Primary | ICD-10-CM

## 2024-10-02 ENCOUNTER — OFFICE VISIT (OUTPATIENT)
Dept: TRANSPLANT | Facility: CLINIC | Age: 39
End: 2024-10-02
Attending: INTERNAL MEDICINE
Payer: MEDICAID

## 2024-10-02 ENCOUNTER — LAB VISIT (OUTPATIENT)
Dept: LAB | Facility: HOSPITAL | Age: 39
End: 2024-10-02
Attending: INTERNAL MEDICINE
Payer: MEDICAID

## 2024-10-02 VITALS
SYSTOLIC BLOOD PRESSURE: 118 MMHG | HEIGHT: 70 IN | WEIGHT: 233.44 LBS | BODY MASS INDEX: 33.42 KG/M2 | DIASTOLIC BLOOD PRESSURE: 67 MMHG | HEART RATE: 76 BPM

## 2024-10-02 DIAGNOSIS — G47.33 OSA (OBSTRUCTIVE SLEEP APNEA): ICD-10-CM

## 2024-10-02 DIAGNOSIS — I21.19 INFERIOR MI: ICD-10-CM

## 2024-10-02 DIAGNOSIS — E78.2 MIXED HYPERLIPIDEMIA: ICD-10-CM

## 2024-10-02 DIAGNOSIS — I25.10 CORONARY ARTERY DISEASE INVOLVING NATIVE CORONARY ARTERY OF NATIVE HEART WITHOUT ANGINA PECTORIS: ICD-10-CM

## 2024-10-02 DIAGNOSIS — R74.8 LOW SERUM HDL: ICD-10-CM

## 2024-10-02 DIAGNOSIS — I10 ESSENTIAL HYPERTENSION: ICD-10-CM

## 2024-10-02 DIAGNOSIS — K21.9 GASTROESOPHAGEAL REFLUX DISEASE WITHOUT ESOPHAGITIS: ICD-10-CM

## 2024-10-02 DIAGNOSIS — I50.42 CHRONIC COMBINED SYSTOLIC AND DIASTOLIC CONGESTIVE HEART FAILURE: ICD-10-CM

## 2024-10-02 DIAGNOSIS — I50.42 CHRONIC COMBINED SYSTOLIC AND DIASTOLIC CONGESTIVE HEART FAILURE: Primary | ICD-10-CM

## 2024-10-02 LAB
ALBUMIN SERPL BCP-MCNC: 4.1 G/DL (ref 3.5–5.2)
ALP SERPL-CCNC: 71 U/L (ref 55–135)
ALT SERPL W/O P-5'-P-CCNC: 30 U/L (ref 10–44)
ANION GAP SERPL CALC-SCNC: 7 MMOL/L (ref 8–16)
AST SERPL-CCNC: 22 U/L (ref 10–40)
BILIRUB SERPL-MCNC: 0.8 MG/DL (ref 0.1–1)
BNP SERPL-MCNC: 23 PG/ML (ref 0–99)
BUN SERPL-MCNC: 12 MG/DL (ref 6–20)
CALCIUM SERPL-MCNC: 9.9 MG/DL (ref 8.7–10.5)
CHLORIDE SERPL-SCNC: 103 MMOL/L (ref 95–110)
CHOLEST SERPL-MCNC: 117 MG/DL (ref 120–199)
CHOLEST/HDLC SERPL: 3.4 {RATIO} (ref 2–5)
CO2 SERPL-SCNC: 27 MMOL/L (ref 23–29)
CREAT SERPL-MCNC: 0.9 MG/DL (ref 0.5–1.4)
EST. GFR  (NO RACE VARIABLE): >60 ML/MIN/1.73 M^2
GLUCOSE SERPL-MCNC: 108 MG/DL (ref 70–110)
HDLC SERPL-MCNC: 34 MG/DL (ref 40–75)
HDLC SERPL: 29.1 % (ref 20–50)
LDLC SERPL CALC-MCNC: 45.2 MG/DL (ref 63–159)
NONHDLC SERPL-MCNC: 83 MG/DL
POTASSIUM SERPL-SCNC: 4.1 MMOL/L (ref 3.5–5.1)
PROT SERPL-MCNC: 7.3 G/DL (ref 6–8.4)
SODIUM SERPL-SCNC: 137 MMOL/L (ref 136–145)
TRIGL SERPL-MCNC: 189 MG/DL (ref 30–150)

## 2024-10-02 PROCEDURE — 1160F RVW MEDS BY RX/DR IN RCRD: CPT | Mod: CPTII,,, | Performed by: INTERNAL MEDICINE

## 2024-10-02 PROCEDURE — 3078F DIAST BP <80 MM HG: CPT | Mod: CPTII,,, | Performed by: INTERNAL MEDICINE

## 2024-10-02 PROCEDURE — 1159F MED LIST DOCD IN RCRD: CPT | Mod: CPTII,,, | Performed by: INTERNAL MEDICINE

## 2024-10-02 PROCEDURE — 3074F SYST BP LT 130 MM HG: CPT | Mod: CPTII,,, | Performed by: INTERNAL MEDICINE

## 2024-10-02 PROCEDURE — 80053 COMPREHEN METABOLIC PANEL: CPT | Performed by: INTERNAL MEDICINE

## 2024-10-02 PROCEDURE — 99999 PR PBB SHADOW E&M-EST. PATIENT-LVL III: CPT | Mod: PBBFAC,,, | Performed by: INTERNAL MEDICINE

## 2024-10-02 PROCEDURE — 3008F BODY MASS INDEX DOCD: CPT | Mod: CPTII,,, | Performed by: INTERNAL MEDICINE

## 2024-10-02 PROCEDURE — 99214 OFFICE O/P EST MOD 30 MIN: CPT | Mod: S$PBB,,, | Performed by: INTERNAL MEDICINE

## 2024-10-02 PROCEDURE — 83880 ASSAY OF NATRIURETIC PEPTIDE: CPT | Performed by: INTERNAL MEDICINE

## 2024-10-02 PROCEDURE — 4010F ACE/ARB THERAPY RXD/TAKEN: CPT | Mod: CPTII,,, | Performed by: INTERNAL MEDICINE

## 2024-10-02 PROCEDURE — 99213 OFFICE O/P EST LOW 20 MIN: CPT | Mod: PBBFAC | Performed by: INTERNAL MEDICINE

## 2024-10-02 PROCEDURE — 36415 COLL VENOUS BLD VENIPUNCTURE: CPT | Performed by: INTERNAL MEDICINE

## 2024-10-02 PROCEDURE — 80061 LIPID PANEL: CPT | Performed by: INTERNAL MEDICINE

## 2024-10-02 RX ORDER — METOPROLOL SUCCINATE 200 MG/1
200 TABLET, EXTENDED RELEASE ORAL DAILY
Qty: 30 TABLET | Refills: 11 | Status: SHIPPED | OUTPATIENT
Start: 2024-10-02

## 2024-10-02 RX ORDER — ROSUVASTATIN CALCIUM 40 MG/1
40 TABLET, COATED ORAL NIGHTLY
Qty: 30 TABLET | Refills: 11 | Status: SHIPPED | OUTPATIENT
Start: 2024-10-02 | End: 2025-10-02

## 2024-10-02 RX ORDER — SACUBITRIL AND VALSARTAN 97; 103 MG/1; MG/1
1 TABLET, FILM COATED ORAL 2 TIMES DAILY
Qty: 60 TABLET | Refills: 11 | Status: SHIPPED | OUTPATIENT
Start: 2024-10-02

## 2024-10-02 RX ORDER — DAPAGLIFLOZIN 10 MG/1
10 TABLET, FILM COATED ORAL DAILY
Qty: 30 TABLET | Refills: 11 | Status: SHIPPED | OUTPATIENT
Start: 2024-10-02

## 2024-10-02 RX ORDER — EZETIMIBE 10 MG/1
10 TABLET ORAL DAILY
Qty: 30 TABLET | Refills: 11 | Status: SHIPPED | OUTPATIENT
Start: 2024-10-02 | End: 2025-10-02

## 2024-10-02 RX ORDER — CLOPIDOGREL BISULFATE 75 MG/1
75 TABLET ORAL DAILY
Qty: 30 TABLET | Refills: 11 | Status: SHIPPED | OUTPATIENT
Start: 2024-10-02

## 2024-10-02 NOTE — PROGRESS NOTES
Subjective:     Patient ID:  Juan Stephens is a 39 y.o. male who presents for follow-up of Congestive Heart Failure    HPI: 38 yo WM is the son of one of our patients who had a heart transplant and subsequently  of metastatic cancer in 2019.  He reports that in 2018 he suffered a massive myocardial infarction and angiography demonstrated totally occluded right coronary artery.  The coronary stent, Synergy 3.5 x 12 drug-eluting stent was placed.  He had no warning prior to that heart attack and onset of chest pain occurred while showering.  He did well for approximately 2 weeks and then presented back to the hospital with acute congestive heart failure.  Prior to the 2018 hospitalization he was drinking half case of beer per week and also smoking 1 per day.  He quit both of these activities 2019.    He wanted to be seen for his heart failure came to [place] March 15, 2019 and while driving developed recurrent chest pain.  He went to the [facility] emergency room and was subsequently taken to the cardiac catheterization laboratory.  An ulcerated 90% narrowing was noted in the proximal 1st diagonal.  He underwent placement of drug-eluting stents 2.5x 22 and 2.5 x 8 placed in that region overlapping one another with no residual stenosis.  There was a 40% of the 3rd marginal branch.  Right coronary stent previously placed in 2018 at an outside hospital was widely patent.  His left ventricular end-diastolic pressure was normal at 7 mm Hg.  An echocardiogram demonstrated estimated ejection fraction 45% and grade 1 diastolic dysfunction.  Wall motion was described as a globally hypokinetic.    He saw me 2019 and I recommended the following:  Change to aspirin 81 mg qd--bolivar not ecotrin  Change carvedilol to metoprolol succinate 50 mg once daily  They will gradually go up at home to 200 mg a day  Other recommendations for next visit with HF clinic at home would be to do the  following:   Increase entresto to  mg twice a day   Reduce bumetanide to 3 days/week   Consider replacing potassium with spironolactone 25 mg once a day    He saw Dr. Hilton May 2019 but no changes made in regimen.  I was contacted Sept 2019 re: refill.  After reviewing the clinic note and VS from May 2019, I increased metoprolol succinate to 100 mg qd.  At visit 1 month later on Oct 24, 2019 I increased Entresto to  mg BID and 2 weeks later increased metoprolol succinate to 200 mg qd.  At visits in June and Dec 2020 he was doing well no changes made.  When seen January 2022 asked for sleep consult re: sleep apnea and stopped Effient.    At visit July 2022 I added Farxiga 10 mg qd in view of CHF and may help with weight loss    At visit 1/17/2023 he was doing well but elected to increase atorvastatin 80 mg nightly target LDL less than 50.      At visit July 06/20/2023 reported some dyspnea on exertion (minimal) after walking approximately 400 yd.  No orthopnea or PND.  No palpitations presyncope or syncope.    At visit 1/24/24 and learned he saw Dr. Hilton for freq PVC's noted on monitor for endoscopy.  He performed 48 hr monitor that demonstrated very frequent monomorphic PVCs totalling 57366 and averaging 972.54 per hour. The ventricular arrhythmias was 19.7%. There were 112 couplets. There were 2 triplets. Morphological characteristics suggest RVOT focus.  Also echo with EF slightly lower at 40%.      He comes today 10/2/2024 and reports that he feels much better since PVC ablation was performed by Dr. Bernabe in March 20, 2024.  An event monitor April 20, 2024 which he wore for 3 days demonstrated reduction in PVC frequency to 4% (20% prior to procedure).  He has not experienced any tightness, pressure, heaviness in his chest, neck, arms, throat, jaw.  He has some dyspnea on exertion the other day when moving furniture but does not experience dyspnea when walking.  He sleeps on 1 pillow.  No PND  "spells.  He has not experienced palpitations.  No presyncope or syncope.    Review of Systems   Constitutional: Positive for malaise/fatigue. Negative for weight gain and weight loss.   Cardiovascular:  Positive for dyspnea on exertion (See HPI). Negative for chest pain, irregular heartbeat, leg swelling, near-syncope, orthopnea, palpitations, paroxysmal nocturnal dyspnea and syncope.   Respiratory:  Positive for sleep disturbances due to breathing (He uses his CPAP).    Hematologic/Lymphatic: Does not bruise/bleed easily.   Gastrointestinal:  Positive for heartburn (He did not undergo endoscopy and reports this improved following PVC ablation on current therapy).   Neurological:  Negative for dizziness, focal weakness, light-headedness and weakness.     Objective:   Physical Exam  Constitutional:       General: He is not in acute distress.     Appearance: He is well-developed. He is not diaphoretic.      Comments: /67   Pulse 76   Ht 5' 10" (1.778 m)   Wt 105.9 kg (233 lb 7.5 oz)   BMI 33.50 kg/m²   Last 3 visits wts 226, 224 and 225#  Friendly white male in no acute distress.     HENT:      Head: Normocephalic and atraumatic.   Eyes:      General: No scleral icterus.        Right eye: No discharge.         Left eye: No discharge.      Conjunctiva/sclera: Conjunctivae normal.   Neck:      Thyroid: No thyromegaly.      Vascular: No JVD.   Cardiovascular:      Rate and Rhythm: Normal rate and regular rhythm.      Heart sounds: Normal heart sounds. No murmur heard.     No gallop.      Comments: No extra heartbeats on exam today  Pulmonary:      Effort: Pulmonary effort is normal.      Breath sounds: Normal breath sounds.   Abdominal:      General: Bowel sounds are normal. There is no distension.      Palpations: Abdomen is soft. There is no mass.      Tenderness: There is no abdominal tenderness. There is no guarding or rebound.   Musculoskeletal:         General: No swelling or tenderness.      Right lower " leg: No edema.      Left lower leg: No edema.   Skin:     General: Skin is warm and dry.   Neurological:      General: No focal deficit present.      Mental Status: He is oriented to person, place, and time. Mental status is at baseline.   Psychiatric:         Mood and Affect: Mood normal.         Behavior: Behavior normal.         Thought Content: Thought content normal.         Judgment: Judgment normal.     Labs reviewed as follows:  Lab Results   Component Value Date    BNP 23 10/02/2024     10/02/2024    K 4.1 10/02/2024    MG 2.1 10/24/2019     10/02/2024    CO2 27 10/02/2024    BUN 12 10/02/2024    CREATININE 0.9 10/02/2024     10/02/2024    HGBA1C 5.1 03/15/2019    AST 22 10/02/2024    ALT 30 10/02/2024    ALBUMIN 4.1 10/02/2024    PROT 7.3 10/02/2024    BILITOT 0.8 10/02/2024    WBC 6.22 07/26/2023    HGB 17.1 07/26/2023    HCT 49.9 07/26/2023     07/26/2023    CHOL 117 (L) 10/02/2024    HDL 34 (L) 10/02/2024    LDLCALC 45.2 (L) 10/02/2024    TRIG 189 (H) 10/02/2024        4/11/2024 3 day event monitor  The patient wore the monitor for 3 days.    The predominant rhythm was sinus with an average rate of 83 bpm and range of  bpm.    There were no atrial arrhythmias observed.    There were frequent PVCs with a 4% burden.    There were no button activations.       1/24/2024 Cardiac PET stress--reviewed with Dr. Alvarado, no ischemia    There is a large sized, moderate to severe intensity basal to apical inferior, inferoseptal and inferolateral fixed perfusion abnormality involving 25% of LV myocardium consistent with a mix of transmural and non-transmural scar.    There are no other significant perfusion abnormalities.    The whole heart absolute myocardial perfusion values averaged 0.41 cc/min/g at rest, which is reduced; 1.03 cc/min/g at stress, which is moderately reduced; and CFR is 2.42 , which is low normal.    CT attenuation images demonstrate mild diffuse coronary  calcifications in the LAD and RCA territory and no aortic calcifications.    The gated perfusion images showed an ejection fraction of 35% at rest and 34% during stress. A normal ejection fraction is greater than 47%.    There is mild global hypokinesis at rest and during stress.    There is inferior wall moderate hypokinesis at rest and during stress.    The LV cavity size is mildly enlarged at rest and stress.    The ECG portion of the study is negative for ischemia.    During stress, frequent PVCs are noted.    The patient reported no chest pain during the stress test.    There are no prior studies for comparison.    Flow within defect is suggestive of open coronary anatomy to infarcted tissue.    12/29/2023 48 hr holter Dr. Hilton  Predominant Rhythm  Sinus rhythm    Heart rates varied between 63 and 110 BPM with an average of 84 BPM.     Maximum heart rate recorded at: 21:09 CST on day 2.     Minimum heart rate recorded at 13:25 CST on day 2.     PVC    Ventricular Arrhythmias  There were very frequent monomorphic PVCs totalling 28294 and averaging 972.54 per hour. The ventricular arrhythmias percentage was 19.7. There were 112 couplets. There were 2 triplets. Morphological characteristics suggest RVOT focus.     VT    There was 1 run of non-sustained monomorphic ventricular tachycardia and lasting for 9 beats.   The NSVT was monomorphic, but had a different morphology than the prevailing PVCs.     PAC  Supraventricular Arrhythmias  There were frequent PACs totalling 1504 and averaging 31.33 per hour.           12/29/2023 ECHO    Left Ventricle: The left ventricle is mildly dilated. Normal wall thickness. Regional wall motion abnormalities present. There is mildly reduced systolic function with a visually estimated ejection fraction of 40 - 45%. Biplane (2D) method of discs ejection fraction is 40%. There is normal diastolic function.    Right Ventricle: Normal right ventricular cavity size. Systolic function  is normal.    Left Atrium: Left atrium is mildly dilated.    Aortic Valve: The aortic valve is a trileaflet valve.    Mitral Valve: There is mild regurgitation.    Tricuspid Valve: There is mild regurgitation.    Pulmonary Artery: The estimated pulmonary artery systolic pressure is 25 mmHg.    IVC/SVC: Normal venous pressure at 3 mmHg.    7/7/2022 ECHO  The left ventricle is normal in size with mildly decreased systolic function. The estimated ejection fraction is 45%.  The quantitatively derived ejection fraction is 43%.  There are segmental left ventricular wall motion abnormalities.  Normal right ventricular size with normal right ventricular systolic function.  Grade I left ventricular diastolic dysfunction.  The estimated PA systolic pressure is 25 mmHg.  Normal central venous pressure (3 mmHg).      12/15/2020 echocardiogram  The left ventricle is mildly enlarged with mildly decreased systolic function. The estimated ejection fraction is 40%.  Normal right ventricular size with low normal right ventricular systolic function.  Grade I left ventricular diastolic dysfunction.  The estimated PA systolic pressure is 24 mmHg.  Normal central venous pressure (3 mmHg).      Assessment:     1. Chronic combined systolic and diastolic congestive heart failure    2. Inferior MI    3. Coronary artery disease involving native coronary artery of native heart without angina pectoris    4. Low serum HDL    5. Mixed hyperlipidemia    6. Essential hypertension    7. KALIA (obstructive sleep apnea)    8. Gastroesophageal reflux disease without esophagitis      Plan:   I discussed with him the low HDL and measures that help exercise, weight loss do some sugar/carbohydrate intake.  Tells me his weight has been stable number of years and that he does not need bread, pasta, sweets.  We discussed rationale of maintaining his LDL < 55 especially with his low HDL.  Goals are met on Zetia plus rosuvastatin so no changes made  today    Continue current therapy with Entresto, metoprolol succinate, Farxiga for his heart failure.  Since not having active heart failure and ejection fraction exceeds 35% he is not taking an aldosterone antagonist.    RTC in March when he sees Dr. Bernabe and will obtain echo and see him with BMP and BNP

## 2024-10-02 NOTE — Clinical Note
I want to see him in March when he sees Dr. Bernabe even if I am not in clinic it is fine add him on.  He needs an echocardiogram BMP and BNP at that time

## 2024-11-15 ENCOUNTER — PATIENT MESSAGE (OUTPATIENT)
Dept: PULMONOLOGY | Facility: CLINIC | Age: 39
End: 2024-11-15
Payer: MEDICAID

## 2024-11-18 ENCOUNTER — OFFICE VISIT (OUTPATIENT)
Dept: PULMONOLOGY | Facility: CLINIC | Age: 39
End: 2024-11-18
Payer: MEDICAID

## 2024-11-18 VITALS — HEIGHT: 70 IN | WEIGHT: 220 LBS | BODY MASS INDEX: 31.5 KG/M2

## 2024-11-18 DIAGNOSIS — G47.33 OSA (OBSTRUCTIVE SLEEP APNEA): ICD-10-CM

## 2024-11-18 PROCEDURE — 1159F MED LIST DOCD IN RCRD: CPT | Mod: CPTII,95,,

## 2024-11-18 PROCEDURE — 3008F BODY MASS INDEX DOCD: CPT | Mod: CPTII,95,,

## 2024-11-18 PROCEDURE — 1160F RVW MEDS BY RX/DR IN RCRD: CPT | Mod: CPTII,95,,

## 2024-11-18 PROCEDURE — 4010F ACE/ARB THERAPY RXD/TAKEN: CPT | Mod: CPTII,95,,

## 2024-11-18 PROCEDURE — 99212 OFFICE O/P EST SF 10 MIN: CPT | Mod: 95,,,

## 2024-11-18 NOTE — PATIENT INSTRUCTIONS
Ideal usage 100% of nights for 6-8 hours per night. Minimum usage is 70% of night for at least 4 hours per night.    CPAP Devices for Sleep Apnea  CPAP Devices for Sleep Apnea  What is a CPAP device?  One way to treat people who have sleep apnea is a continuous positive airway pressure (CPAP) device. A CPAP device includes a mask, tubes and a fan. It uses air pressure to push your tongue forward and open your throat. This allows air to pass through your throat. It reduces snoring and prevents apnea disturbances.   You should put your CPAP device on whenever you sleep, even for naps. A CPAP device does not cure sleep apnea. But, when you use the device correctly, your sleep problems should get much better.   Do I need a CPAP device?  Talk to your doctor if you think you have sleep apnea. Your doctor may ask you to go to a sleep center for a sleep study. During your sleep study, you may try different levels of air pressure with a CPAP device to see which level helps. In general, heavier people and people who have severe apnea need higher air pressures. If you need a CPAP device, your doctor will help you choose one that is right for you.   What if I have problems with my CPAP device?  Many people have problems with their CPAP device, especially at first. It's important to not give up. Often, the problems go away when you get used to wearing the device. It may also be helpful for you to find a support group in your area so that you can talk with other people who have sleep apnea.   The following are some common problems you may have with your CPAP device, and some possible solutions:   The mask feels uncomfortable. Because everyone's face has a different shape, you may need to try different masks to find one that fits you well.   Your nose feels dry and stuffy. You can try using a humidifier to moisten the air from the CPAP device.   Your nose feels blocked up. Some people who have sleep apnea also have nose problems.  Ask your doctor if you have a nose problem that can be treated with a nasal spray. Surgery is sometimes also an option. People who breathe through their mouths don't do as well with CPAP nose masks. In this case, a full-face mask that covers both the nose and the mouth may help (see the picture below).   The mask bothers your skin and nose. Because the mask must fit firmly over your nose and cheeks, it may irritate your skin. A different size or kind of mask may help. There are also special skin moisturizers made for CPAP device users. Some petroleum-based products can damage the mask, so ask your doctor for more information. Some people also benefit from using nasal pillows that fit into the nostrils and relieve pressure on the bridge of the nose (see the picture below). Using a regular CPAP mask one night and nasal pillows the next night may help you feel more comfortable.   The mask leaks air. Some people can't keep their jaw closed while wearing the mask. A chin strap can help hold up your jaw to keep the air in (see the picture below).   You don't like the pressure. You may find that breathing out against the air pressure keeps you from sleeping deeply. Your doctor may ask you to use a bi-level machine that lowers the air pressure when you breathe out. The same mask may be used with CPAP and bi-level machines.   You take the mask off during your sleep or don't wear it every night. Most people can't wear the mask all night long, every night, right from the start. Keep trying, even if you can only use the mask for an hour a night at first. Once you solve your comfort problems, you should be able to increase the time you wear the mask.   You just can't get used to the mask. Some people find that wearing a dental device that pushes their tongue forward helps. You may want to talk with your doctor about whether throat or jaw surgery could help.         Please call office [phone] for any questions

## 2024-11-18 NOTE — ASSESSMENT & PLAN NOTE
Still taking melatonin PRN  Continue melatonin        [Cane] : ambulates with cane [Stooped] : not stooped [Antalgic] : not antalgic [FreeTextEntry2] : Her incision is healed Stable strength sensation bilateral lower extremities [de-identified] : AP lateral lumbar x-rays demonstrates instrumented L2-3 fusion. This is stable compared to prior imaging.\par \par Review of her cervical spine MRI reveals prior surgical decompression stabilization.  No compression fractures.  Multiple areas of foraminal stenosis

## 2024-11-18 NOTE — ASSESSMENT & PLAN NOTE
Auto PAP 4-20 cm H2O  Usage days 62/63 days (98.41%)  >= 4 hours 58 days (92.06%)  < 4 hours 4 days (6.35%)  AHI: 1.3  [facility]  Resmed Full Face Mask   Using & Benefits   Plan:  Continue CPAP therapy, f/u in 1 year

## 2024-11-18 NOTE — PROGRESS NOTES
Subjective:      Patient ID: Juan Stephens is a 39 y.o. male.    Chief Complaint: Sleep Apnea      The patient location is: [place]  The chief complaint leading to consultation is: KALIA    Visit type: audiovisual    Face to Face time with patient: 10  30 minutes of total time spent on the encounter, which includes face to face time and non-face to face time preparing to see the patient (eg, review of tests), Obtaining and/or reviewing separately obtained history, Documenting clinical information in the electronic or other health record, Independently interpreting results (not separately reported) and communicating results to the patient/family/caregiver, or Care coordination (not separately reported).         Each patient to whom he or she provides medical services by telemedicine is:  (1) informed of the relationship between the physician and patient and the respective role of any other health care provider with respect to management of the patient; and (2) notified that he or she may decline to receive medical services by telemedicine and may withdraw from such care at any time.    Notes:     11/18/2024  Juan Stephens 39 y.o.  Patient presents with Sleep Apnea  5/11/22 Recc CPAP therapy on 8 cm H2O   4/27/22 PSG: Mild Obstructive Sleep apnea(KALIA): AHI was 9.4/hr with 54 events. 10 central apneas.     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   Pipeline worker  Still taking Melatonin helps some  Called patient to back to discuss download data and ensure questions answered and needs addressed. None at this time  Bedtime: 9:30- 10 pm  Wake: 5 am    Compliance Download:  Usage 09/15/2024 - 11/16/2024  Usage days 62/63 days (98.41%)  >= 4 hours 58 days (92.06%)  < 4 hours 4 days (6.35%)  AHI: 1.3                11/18/2024     2:57 PM   EPWORTH SLEEPINESS SCALE   Sitting and reading 3   Watching TV 3   Sitting, inactive in a public place (e.g. a theatre or a meeting) 1   As a passenger in a car for an hour without a break 1   Lying down to rest in the afternoon when circumstances permit 2   Sitting and talking to someone 0   Sitting quietly after a lunch without alcohol 1   In a car, while stopped for a few minutes in traffic 0   Total score 11      Interval HPI History:  05/31/2022  Here to review results  Mild KALIA on initial diagnostic with some centrals  CPAP titration 8 cm was optimal  Discussed indications and goals of therapy  Will expedite set up     10/05/2022  Here for CPAp adherence  Using and benefit  Wears for 8 hours  Bed time 0830 pm, wake time 5-6 am  [place] 9  Usage > 4 hrs was 85%  AHI 0.0  Used 30 out of 30 days  No more vivid dreams    Review of Systems   Constitutional: Negative.    Respiratory:  Negative for somnolence.    Psychiatric/Behavioral:  Negative for sleep disturbance.        Past Medical History:   Diagnosis Date    CAD (coronary artery disease) dEC 2019    SYNERGY 3.5X12 RCA STENT WITH IMI; LATER MARCH 2019 D1 STENTED SARAHI 2.5X22 AND 2.5X8 OVERLAPPING    CHF (congestive heart failure)     GERD (gastroesophageal reflux disease)     Hypertension     Inferior MI 12/2018    stent synergy 3.5x12 RCA [place]    Ischemic cardiomyopathy 11/4/2019    Low serum HDL 3/20/2019     Past Surgical History:   Procedure Laterality Date    ABLATION N/A 3/11/2024    Procedure: Ablation;  Surgeon: Joseph Bernabe MD;  Location: [facility] EP LAB;  Service: Cardiology;  Laterality: N/A;  PVC, RFA ,Carto,MAC, MT, 3 Prep ** Will need IC assist**    CLOSURE DEVICE  3/11/2024    Procedure: Placement of Closure Device;  Surgeon: Joseph Bernabe MD;  Location: [facility] EP LAB;  Service: Cardiology;;    CORONARY ANGIOPLASTY WITH STENT PLACEMENT      HERNIA REPAIR Left     INGUINAL    LEFT HEART  "CATHETERIZATION Left 3/18/2019    Procedure: Left heart cath;  Surgeon: Otilio Woody MD;  Location: [facility] CATH LAB;  Service: Cardiology;  Laterality: Left;    [place] LEFT HEART CATH,PERCUTANEOUS  12/31/2018    Cardiac Cath, Left Heart      Tobacco Use: High Risk (11/18/2024)    Patient History     Smoking Tobacco Use: Former     Smokeless Tobacco Use: Current     Passive Exposure: Not on file      Current Outpatient Medications   Medication Sig    cetirizine (ZYRTEC) 10 MG tablet Take 10 mg by mouth once daily.    clopidogreL (PLAVIX) 75 mg tablet Take 1 tablet (75 mg total) by mouth once daily.    dapagliflozin propanediol (FARXIGA) 10 mg tablet Take 1 tablet (10 mg total) by mouth once daily.    DEXILANT 30 mg CpDM Take 30 mg by mouth once daily.    ezetimibe (ZETIA) 10 mg tablet Take 1 tablet (10 mg total) by mouth once daily.    melatonin (MELATIN) 3 mg tablet Take 6 mg by mouth nightly as needed for Insomnia.    metoprolol succinate (TOPROL-XL) 200 MG 24 hr tablet Take 1 tablet (200 mg total) by mouth once daily.    rosuvastatin (CRESTOR) 40 MG Tab Take 1 tablet (40 mg total) by mouth every evening.    sacubitriL-valsartan (ENTRESTO)  mg per tablet Take 1 tablet by mouth 2 (two) times daily.   Last reviewed on 11/18/2024  2:57 PM by Mabel Rosario LPN      Objective:   There were no vitals filed for this visit.  Body mass index is 31.57 kg/m².             11/18/2024     2:57 PM 10/2/2024    10:14 AM 5/28/2024     8:33 AM 3/11/2024     1:26 PM 3/11/2024     1:15 PM 3/11/2024     1:00 PM 3/11/2024    12:45 PM   Pulmonary Function Tests   SpO2    98 % 98 % 98 % 98 %   Height 5' 10" (1.778 m) 5' 10" (1.778 m) 5' 10" (1.778 m)       Weight 99.8 kg (220 lb) 105.9 kg (233 lb 7.5 oz) 99.8 kg (220 lb)       BMI (Calculated) 31.6 33.5 31.6                  Personal Diagnostic Review  [facility]  [facility]  [address] [place] " [address]  Phone: [phone]  juanita maradiaga  09/15/2024 - 2024  : 1985  Age: 39 years  Compliance Report  Usage 09/15/2024 - 2024  Usage days 62/63 days (98.41%)  >= 4 hours 58 days (92.06%)  < 4 hours 4 days (6.35%)  Other 1 days (1.59%)  Average usage (total days) 5 hours 40 minutes  Average usage (days used) 5 hours 46 minutes  Best 30 10/13/2024 - 2024 29/30 days (96.667%)  G3 Auto-CPAP [id number]  Mode: Auto-CPAP Settings Collected: 2024 SN Assigned: Unknown  Ramp/Initial P (cmH2O): 4 Min Pressure (cmH2O): 4 Max Pressure (cmH2O): 20  Reslex: 3 Reslex patient: Off  Therapy  Avg Pressure (cmH2O) 7.7 Avg P95 (cmH2O) 10.5  Avg Leak (L/min) 39.5 Avg High Leak Minutes 0  Avg AHI 1.3 Avg NISHA 0.3  Avg AI 0.6       Assessment/Plan:     1. KALIA (obstructive sleep apnea)  Assessment & Plan:  Auto PAP 4-20 cm H2O  Usage days 62/63 days (98.41%)  >= 4 hours 58 days (92.06%)  < 4 hours 4 days (6.35%)  AHI: 1.3  [facility]  Resmed Full Face Mask   Using & Benefits   Plan:  Continue CPAP therapy, f/u in 1 year    Orders:  -     CPAP/BIPAP SUPPLIES           Outpatient Encounter Medications as of 2024   Medication Sig Dispense Refill    cetirizine (ZYRTEC) 10 MG tablet Take 10 mg by mouth once daily.      clopidogreL (PLAVIX) 75 mg tablet Take 1 tablet (75 mg total) by mouth once daily. 30 tablet 11    dapagliflozin propanediol (FARXIGA) 10 mg tablet Take 1 tablet (10 mg total) by mouth once daily. 30 tablet 11    DEXILANT 30 mg CpDM Take 30 mg by mouth once daily.      ezetimibe (ZETIA) 10 mg tablet Take 1 tablet (10 mg total) by mouth once daily. 30 tablet 11    melatonin (MELATIN) 3 mg tablet Take 6 mg by mouth nightly as needed for Insomnia.      metoprolol succinate (TOPROL-XL) 200 MG 24 hr tablet Take 1 tablet (200 mg total) by mouth once daily. 30 tablet 11    rosuvastatin (CRESTOR) 40 MG Tab Take 1 tablet (40 mg total) by mouth every evening. 30 tablet 11     sacubitriL-valsartan (ENTRESTO)  mg per tablet Take 1 tablet by mouth 2 (two) times daily. 60 tablet 11     No facility-administered encounter medications on file as of 11/18/2024.     Orders Placed This Encounter   Procedures    CPAP/BIPAP SUPPLIES     Order Specific Question:   Length of need (1-99 months):     Answer:   99     Order Specific Question:   Choose ONE mask type and its corresponding cushions and/or pillows:     Answer:    Full Face Mask, 1 per 90 days:  Full Face Cushion, (3 per 90 days)     Order Specific Question:   Choose EITHER Heated or Non-Heated Tubjing     Answer:    Non-Heated Tubing, 1 per 90 days     Order Specific Question:   All other supplies as needed as listed below:     Answer:    Headgear, 1 per 180 days     Order Specific Question:   All other supplies as needed as listed below:     Answer:    Chin Strap, 1 per 180 days     Order Specific Question:   All other supplies as needed as listed below:     Answer:    Disposable Filter, 6 per 90 days     Order Specific Question:   All other supplies as needed as listed below:     Answer:    Exhalation Port, contact payer for quantity/frequency     Order Specific Question:   All other supplies as needed as listed below:     Answer:    Humidifier Chamber, 1 per 180 days     Order Specific Question:   DME Agency:     Answer:   [facility]

## 2024-11-26 DIAGNOSIS — I50.42 CHRONIC COMBINED SYSTOLIC AND DIASTOLIC CONGESTIVE HEART FAILURE: Primary | ICD-10-CM

## 2025-01-31 ENCOUNTER — OFFICE VISIT (OUTPATIENT)
Dept: TRANSPLANT | Facility: CLINIC | Age: 40
End: 2025-01-31
Attending: INTERNAL MEDICINE
Payer: MEDICAID

## 2025-01-31 ENCOUNTER — LAB VISIT (OUTPATIENT)
Dept: LAB | Facility: HOSPITAL | Age: 40
End: 2025-01-31
Attending: INTERNAL MEDICINE
Payer: MEDICAID

## 2025-01-31 VITALS
HEART RATE: 79 BPM | DIASTOLIC BLOOD PRESSURE: 61 MMHG | SYSTOLIC BLOOD PRESSURE: 108 MMHG | BODY MASS INDEX: 32.76 KG/M2 | HEIGHT: 70 IN | WEIGHT: 228.81 LBS

## 2025-01-31 DIAGNOSIS — R74.8 LOW SERUM HDL: ICD-10-CM

## 2025-01-31 DIAGNOSIS — I21.19 INFERIOR MI: ICD-10-CM

## 2025-01-31 DIAGNOSIS — I10 ESSENTIAL HYPERTENSION: ICD-10-CM

## 2025-01-31 DIAGNOSIS — I25.10 CORONARY ARTERY DISEASE INVOLVING NATIVE CORONARY ARTERY OF NATIVE HEART WITHOUT ANGINA PECTORIS: ICD-10-CM

## 2025-01-31 DIAGNOSIS — I50.42 CHRONIC COMBINED SYSTOLIC AND DIASTOLIC CONGESTIVE HEART FAILURE: ICD-10-CM

## 2025-01-31 DIAGNOSIS — I25.5 ISCHEMIC CARDIOMYOPATHY: ICD-10-CM

## 2025-01-31 DIAGNOSIS — Z01.810 PREOPERATIVE CARDIOVASCULAR EXAMINATION: Primary | ICD-10-CM

## 2025-01-31 LAB
ANION GAP SERPL CALC-SCNC: 9 MMOL/L (ref 8–16)
BNP SERPL-MCNC: 70 PG/ML (ref 0–99)
BUN SERPL-MCNC: 14 MG/DL (ref 6–20)
CALCIUM SERPL-MCNC: 9.3 MG/DL (ref 8.7–10.5)
CHLORIDE SERPL-SCNC: 106 MMOL/L (ref 95–110)
CO2 SERPL-SCNC: 24 MMOL/L (ref 23–29)
CREAT SERPL-MCNC: 1.3 MG/DL (ref 0.5–1.4)
EST. GFR  (NO RACE VARIABLE): >60 ML/MIN/1.73 M^2
GLUCOSE SERPL-MCNC: 69 MG/DL (ref 70–110)
POTASSIUM SERPL-SCNC: 4.1 MMOL/L (ref 3.5–5.1)
SODIUM SERPL-SCNC: 139 MMOL/L (ref 136–145)

## 2025-01-31 PROCEDURE — 99214 OFFICE O/P EST MOD 30 MIN: CPT | Mod: S$PBB,,, | Performed by: INTERNAL MEDICINE

## 2025-01-31 PROCEDURE — 1159F MED LIST DOCD IN RCRD: CPT | Mod: CPTII,,, | Performed by: INTERNAL MEDICINE

## 2025-01-31 PROCEDURE — 99999 PR PBB SHADOW E&M-EST. PATIENT-LVL III: CPT | Mod: PBBFAC,,, | Performed by: INTERNAL MEDICINE

## 2025-01-31 PROCEDURE — 99213 OFFICE O/P EST LOW 20 MIN: CPT | Mod: PBBFAC | Performed by: INTERNAL MEDICINE

## 2025-01-31 PROCEDURE — 1160F RVW MEDS BY RX/DR IN RCRD: CPT | Mod: CPTII,,, | Performed by: INTERNAL MEDICINE

## 2025-01-31 PROCEDURE — 80048 BASIC METABOLIC PNL TOTAL CA: CPT | Performed by: INTERNAL MEDICINE

## 2025-01-31 PROCEDURE — 4010F ACE/ARB THERAPY RXD/TAKEN: CPT | Mod: CPTII,,, | Performed by: INTERNAL MEDICINE

## 2025-01-31 PROCEDURE — 83880 ASSAY OF NATRIURETIC PEPTIDE: CPT | Performed by: INTERNAL MEDICINE

## 2025-01-31 PROCEDURE — 3078F DIAST BP <80 MM HG: CPT | Mod: CPTII,,, | Performed by: INTERNAL MEDICINE

## 2025-01-31 PROCEDURE — 36415 COLL VENOUS BLD VENIPUNCTURE: CPT | Performed by: INTERNAL MEDICINE

## 2025-01-31 PROCEDURE — 3074F SYST BP LT 130 MM HG: CPT | Mod: CPTII,,, | Performed by: INTERNAL MEDICINE

## 2025-01-31 PROCEDURE — 3008F BODY MASS INDEX DOCD: CPT | Mod: CPTII,,, | Performed by: INTERNAL MEDICINE

## 2025-01-31 NOTE — PROGRESS NOTES
Subjective:     Patient ID:  Juan Stephens is a 39 y.o. male who presents for follow-up of Congestive Heart Failure   that  HPI: 38 yo WM is the son of one of our patients who had a heart transplant and subsequently  of metastatic cancer in 2019.  He reports that in 2018 he suffered a massive myocardial infarction and angiography demonstrated totally occluded right coronary artery.  The coronary stent, Synergy 3.5 x 12 drug-eluting stent was placed.  He had no warning prior to that heart attack and onset of chest pain occurred while showering.  He did well for approximately 2 weeks and then presented back to the hospital with acute congestive heart failure.  Prior to the 2018 hospitalization he was drinking half case of beer per week and also smoking 1 per day.  He quit both of these activities 2019.    He wanted to be seen for his heart failure came to [place] March 15, 2019 and while driving developed recurrent chest pain.  He went to the [facility] emergency room and was subsequently taken to the cardiac catheterization laboratory.  An ulcerated 90% narrowing was noted in the proximal 1st diagonal.  He underwent placement of drug-eluting stents 2.5x 22 and 2.5 x 8 placed in that region overlapping one another with no residual stenosis.  There was a 40% of the 3rd marginal branch.  Right coronary stent previously placed in 2018 at an outside hospital was widely patent.  His left ventricular end-diastolic pressure was normal at 7 mm Hg.  An echocardiogram demonstrated estimated ejection fraction 45% and grade 1 diastolic dysfunction.  Wall motion was described as a globally hypokinetic.    He saw me 2019 and I recommended the following:  Change to aspirin 81 mg qd--bolivar not ecotrin  Change carvedilol to metoprolol succinate 50 mg once daily  They will gradually go up at home to 200 mg a day  Other recommendations for next visit with HF clinic at home would be to  do the following:   Increase entresto to  mg twice a day   Reduce bumetanide to 3 days/week   Consider replacing potassium with spironolactone 25 mg once a day    He saw Dr. Hilton May 2019 but no changes made in regimen.  I was contacted Sept 2019 re: refill.  After reviewing the clinic note and VS from May 2019, I increased metoprolol succinate to 100 mg qd.  At visit 1 month later on Oct 24, 2019 I increased Entresto to  mg BID and 2 weeks later increased metoprolol succinate to 200 mg qd.  At visits in June and Dec 2020 he was doing well no changes made.  When seen January 2022 asked for sleep consult re: sleep apnea and stopped Effient.    At visit July 2022 I added Farxiga 10 mg qd in view of CHF and may help with weight loss    At visit 1/17/2023 he was doing well but elected to increase atorvastatin 80 mg nightly target LDL less than 50.      At visit July 06/20/2023 reported some dyspnea on exertion (minimal) after walking approximately 400 yd.  No orthopnea or PND.  No palpitations presyncope or syncope.    At visit 1/24/24 and learned he saw Dr. Hilton for freq PVC's noted on monitor for endoscopy.  He performed 48 hr monitor that demonstrated very frequent monomorphic PVCs totalling 63912 and averaging 972.54 per hour. The ventricular arrhythmias was 19.7%. There were 112 couplets. There were 2 triplets. Morphological characteristics suggest RVOT focus.  Also echo with EF slightly lower at 40%.      At visit 10/2/2024 and reports that he feels much better since PVC ablation was performed by Dr. Bernabe in March 20, 2024.  An event monitor April 20, 2024 which he wore for 3 days demonstrated reduction in PVC frequency to 4% (20% prior to procedure).  He has not experienced any tightness, pressure, heaviness in his chest, neck, arms, throat, jaw.  He has some dyspnea on exertion the other day when moving furniture but does not experience dyspnea when walking.  He sleeps on 1 pillow.  No PND  "spells.  He has not experienced palpitations.  No presyncope or syncope.    He comes today 1/30/25 and reports no angina.  No dyspnea on exertion, orthopnea or PND.  No symptomatic arrhythmia.  He works 7 days per week and shall met in his wife comes down and visits on the weekends.  His children work with him.  They are working on gasoline stations.  He estimates that with his job he walks 7-8 miles per day.    Review of Systems   Constitutional: Positive for malaise/fatigue and weight loss (few  pounds on my scale but overall wt stable). Negative for weight gain.   Cardiovascular:  Positive for dyspnea on exertion (See HPI). Negative for chest pain, irregular heartbeat, leg swelling, near-syncope, orthopnea, palpitations, paroxysmal nocturnal dyspnea and syncope.   Respiratory:  Positive for sleep disturbances due to breathing (He uses his CPAP).    Hematologic/Lymphatic: Does not bruise/bleed easily.   Gastrointestinal:  Positive for heartburn (He did not undergo endoscopy and reports this improved following PVC ablation on current therapy).   Neurological:  Negative for dizziness, focal weakness, light-headedness and weakness.     Objective:   Physical Exam  Constitutional:       General: He is not in acute distress.     Appearance: He is well-developed. He is not diaphoretic.      Comments: /61   Pulse 79   Ht 5' 10" (1.778 m)   Wt 103.8 kg (228 lb 13.4 oz)   BMI 32.83 kg/m²   Last 4 visits wts 233, 226, 224 and 225#  Friendly white male in no acute distress.     HENT:      Head: Normocephalic and atraumatic.   Eyes:      General: No scleral icterus.        Right eye: No discharge.         Left eye: No discharge.      Conjunctiva/sclera: Conjunctivae normal.   Neck:      Thyroid: No thyromegaly.      Vascular: No JVD.   Cardiovascular:      Rate and Rhythm: Normal rate. Rhythm irregular.      Heart sounds: Normal heart sounds. No murmur heard.     No gallop.      Comments: occ extra heartbeats on exam " today  Pulmonary:      Effort: Pulmonary effort is normal.      Breath sounds: Normal breath sounds.   Abdominal:      General: Bowel sounds are normal. There is no distension.      Palpations: Abdomen is soft. There is no mass.      Tenderness: There is no abdominal tenderness. There is no guarding or rebound.      Hernia: A hernia (umbilical) is present.   Musculoskeletal:         General: No swelling or tenderness.      Right lower leg: No edema.      Left lower leg: No edema.   Skin:     General: Skin is warm and dry.   Neurological:      General: No focal deficit present.      Mental Status: He is oriented to person, place, and time. Mental status is at baseline.   Psychiatric:         Mood and Affect: Mood normal.         Behavior: Behavior normal.         Thought Content: Thought content normal.         Judgment: Judgment normal.     Labs reviewed as follows:  Lab Results   Component Value Date    BNP 70 01/31/2025    BNP 23 10/02/2024    BNP 25 01/24/2024     Lab Results   Component Value Date    BNP 70 01/31/2025     01/31/2025    K 4.1 01/31/2025    MG 2.1 10/24/2019     01/31/2025    CO2 24 01/31/2025    BUN 14 01/31/2025    CREATININE 1.3 01/31/2025    GLU 69 (L) 01/31/2025    HGBA1C 5.1 03/15/2019    AST 22 10/02/2024    ALT 30 10/02/2024    ALBUMIN 4.1 10/02/2024    PROT 7.3 10/02/2024    BILITOT 0.8 10/02/2024    WBC 6.22 07/26/2023    HGB 17.1 07/26/2023    HCT 49.9 07/26/2023     07/26/2023    CHOL 117 (L) 10/02/2024    HDL 34 (L) 10/02/2024    LDLCALC 45.2 (L) 10/02/2024    TRIG 189 (H) 10/02/2024        4/11/2024 3 day event monitor  The patient wore the monitor for 3 days.    The predominant rhythm was sinus with an average rate of 83 bpm and range of  bpm.    There were no atrial arrhythmias observed.    There were frequent PVCs with a 4% burden.    There were no button activations.       1/24/2024 Cardiac PET stress--reviewed with Dr. Alvarado, no ischemia    There is a  large sized, moderate to severe intensity basal to apical inferior, inferoseptal and inferolateral fixed perfusion abnormality involving 25% of LV myocardium consistent with a mix of transmural and non-transmural scar.    There are no other significant perfusion abnormalities.    The whole heart absolute myocardial perfusion values averaged 0.41 cc/min/g at rest, which is reduced; 1.03 cc/min/g at stress, which is moderately reduced; and CFR is 2.42 , which is low normal.    CT attenuation images demonstrate mild diffuse coronary calcifications in the LAD and RCA territory and no aortic calcifications.    The gated perfusion images showed an ejection fraction of 35% at rest and 34% during stress. A normal ejection fraction is greater than 47%.    There is mild global hypokinesis at rest and during stress.    There is inferior wall moderate hypokinesis at rest and during stress.    The LV cavity size is mildly enlarged at rest and stress.    The ECG portion of the study is negative for ischemia.    During stress, frequent PVCs are noted.    The patient reported no chest pain during the stress test.    There are no prior studies for comparison.    Flow within defect is suggestive of open coronary anatomy to infarcted tissue.    12/29/2023 48 hr holter Dr. Hilton  Predominant Rhythm  Sinus rhythm    Heart rates varied between 63 and 110 BPM with an average of 84 BPM.     Maximum heart rate recorded at: 21:09 CST on day 2.     Minimum heart rate recorded at 13:25 CST on day 2.     PVC    Ventricular Arrhythmias  There were very frequent monomorphic PVCs totalling 69019 and averaging 972.54 per hour. The ventricular arrhythmias percentage was 19.7. There were 112 couplets. There were 2 triplets. Morphological characteristics suggest RVOT focus.     VT    There was 1 run of non-sustained monomorphic ventricular tachycardia and lasting for 9 beats.   The NSVT was monomorphic, but had a different morphology than the  prevailing PVCs.     PAC  Supraventricular Arrhythmias  There were frequent PACs totalling 1504 and averaging 31.33 per hour.           12/29/2023 ECHO    Left Ventricle: The left ventricle is mildly dilated. Normal wall thickness. Regional wall motion abnormalities present. There is mildly reduced systolic function with a visually estimated ejection fraction of 40 - 45%. Biplane (2D) method of discs ejection fraction is 40%. There is normal diastolic function.    Right Ventricle: Normal right ventricular cavity size. Systolic function is normal.    Left Atrium: Left atrium is mildly dilated.    Aortic Valve: The aortic valve is a trileaflet valve.    Mitral Valve: There is mild regurgitation.    Tricuspid Valve: There is mild regurgitation.    Pulmonary Artery: The estimated pulmonary artery systolic pressure is 25 mmHg.    IVC/SVC: Normal venous pressure at 3 mmHg.    7/7/2022 ECHO  The left ventricle is normal in size with mildly decreased systolic function. The estimated ejection fraction is 45%.  The quantitatively derived ejection fraction is 43%.  There are segmental left ventricular wall motion abnormalities.  Normal right ventricular size with normal right ventricular systolic function.  Grade I left ventricular diastolic dysfunction.  The estimated PA systolic pressure is 25 mmHg.  Normal central venous pressure (3 mmHg).      12/15/2020 echocardiogram  The left ventricle is mildly enlarged with mildly decreased systolic function. The estimated ejection fraction is 40%.  Normal right ventricular size with low normal right ventricular systolic function.  Grade I left ventricular diastolic dysfunction.  The estimated PA systolic pressure is 24 mmHg.  Normal central venous pressure (3 mmHg).      Assessment:     1. Preoperative cardiovascular examination    2. Coronary artery disease involving native coronary artery of native heart without angina pectoris    3. Chronic combined systolic and diastolic  congestive heart failure    4. Essential hypertension    5. Low serum HDL    6. Ischemic cardiomyopathy    7. Inferior MI        Plan:   I discussed with him the low HDL and measures that help exercise, weight loss do some sugar/carbohydrate intake.  Tells me his weight has been stable number of years and that he does not need bread, pasta, sweets.  We discussed rationale of maintaining his LDL < 55 especially with his low HDL.    Continue current therapy with Entresto, metoprolol succinate, Farxiga for his heart failure.  Since not having active heart failure and ejection fraction exceeds 35% he is not taking an aldosterone antagonist.    RTC in July 2025 with echo, lipids, CMP and BNP    Cleared for A&S for hernia repair and OK to hold clopidogrel for up to maximum of 7 days before surgery and resume as soon as possible after the surgery to lessen the risk of heart attack.

## 2025-02-10 ENCOUNTER — PATIENT MESSAGE (OUTPATIENT)
Dept: TRANSPLANT | Facility: CLINIC | Age: 40
End: 2025-02-10
Payer: MEDICAID

## 2025-03-13 ENCOUNTER — PATIENT MESSAGE (OUTPATIENT)
Dept: TRANSPLANT | Facility: CLINIC | Age: 40
End: 2025-03-13
Payer: MEDICAID

## 2025-03-31 ENCOUNTER — CLINICAL SUPPORT (OUTPATIENT)
Dept: CARDIOLOGY | Facility: HOSPITAL | Age: 40
End: 2025-03-31
Attending: INTERNAL MEDICINE
Payer: COMMERCIAL

## 2025-03-31 DIAGNOSIS — I49.3 PREMATURE VENTRICULAR CONTRACTIONS (PVCS) (VPCS): ICD-10-CM

## 2025-03-31 PROCEDURE — 93246 EXT ECG>7D<15D RECORDING: CPT

## 2025-04-02 ENCOUNTER — PATIENT MESSAGE (OUTPATIENT)
Dept: TRANSPLANT | Facility: CLINIC | Age: 40
End: 2025-04-02
Payer: COMMERCIAL

## 2025-04-03 DIAGNOSIS — I50.42 CHRONIC COMBINED SYSTOLIC AND DIASTOLIC CONGESTIVE HEART FAILURE: Primary | ICD-10-CM

## 2025-04-28 ENCOUNTER — TELEPHONE (OUTPATIENT)
Dept: ELECTROPHYSIOLOGY | Facility: CLINIC | Age: 40
End: 2025-04-28
Payer: COMMERCIAL

## 2025-07-02 ENCOUNTER — OFFICE VISIT (OUTPATIENT)
Dept: ELECTROPHYSIOLOGY | Facility: CLINIC | Age: 40
End: 2025-07-02
Payer: COMMERCIAL

## 2025-07-02 VITALS
BODY MASS INDEX: 31.09 KG/M2 | WEIGHT: 217.13 LBS | HEIGHT: 70 IN | HEART RATE: 64 BPM | SYSTOLIC BLOOD PRESSURE: 112 MMHG | DIASTOLIC BLOOD PRESSURE: 60 MMHG

## 2025-07-02 DIAGNOSIS — I50.22 HEART FAILURE, SYSTOLIC, CHRONIC: ICD-10-CM

## 2025-07-02 DIAGNOSIS — I25.5 ISCHEMIC CARDIOMYOPATHY: ICD-10-CM

## 2025-07-02 DIAGNOSIS — I25.5 ISCHEMIC CARDIOMYOPATHY: Primary | ICD-10-CM

## 2025-07-02 DIAGNOSIS — I10 ESSENTIAL HYPERTENSION: ICD-10-CM

## 2025-07-02 DIAGNOSIS — I21.19 INFERIOR MI: ICD-10-CM

## 2025-07-02 DIAGNOSIS — I49.3 PREMATURE VENTRICULAR CONTRACTIONS (PVCS) (VPCS): Primary | ICD-10-CM

## 2025-07-02 LAB
OHS QRS DURATION: 102 MS
OHS QTC CALCULATION: 437 MS

## 2025-07-02 PROCEDURE — 93010 ELECTROCARDIOGRAM REPORT: CPT | Mod: S$GLB,,, | Performed by: INTERNAL MEDICINE

## 2025-07-02 PROCEDURE — 99999 PR PBB SHADOW E&M-EST. PATIENT-LVL III: CPT | Mod: PBBFAC,,, | Performed by: INTERNAL MEDICINE

## 2025-07-02 RX ORDER — DAPAGLIFLOZIN 10 MG/1
10 TABLET, FILM COATED ORAL
COMMUNITY

## 2025-07-02 NOTE — PROGRESS NOTES
Subjective:   Patient ID:  Juan Stephens is a 39 y.o. male who presents for evaluation of No chief complaint on file.      Referring Electrophysiologist: Kirk Hilton MD  Primary Cardiologist: Marshall Cevallos MD    HPI  Prior Hx:  I had the pleasure of seeing Mr. Stephens today in our electrophysiology clinic in follow-up for his frequent PVCs. As you are aware he is a pleasant 39 year-old man, patient of Dr. Kirk Hilton and Dr. Marshall Cevallos, with ischemic CMP with LVEF of 45%, inferior MI in 2018 s/p RCA PCI followed by an NSTEMI in 2019 s/p PCI to D1, and frequent PVCs. Recently noted palpitations and worsening dyspnea on exertion. He was then undergoing an endoscopy and case was cancelled due to frequent PVCs. A holter monitor was done noting a 20% PVC burden. He is referred for consideration for mapping/ablation.    PET stress 1/2024: 25% fixed infarct basal to apical inferior, inferoseptal and inferolateral     I reviewed available ECGs in EPIC which show a narrow PVC with rS in V1 with a positive concordant transition starting in V2, superior rightward axis. However ECG from recent PET stress noted a PVC focus with left bundle, V3 transition, inferior leftward axis.     3/11/2024: PVC mapping and ablation and LV inferobasal septum    5/2024: Mr. Stephens returned for follow-up. Post-ablation holter monitor noted 4% PVC burden (dimorphic PVCs on prior monitoring, most frequent PVC mapped/ablated). He feels well. Feels much better.    Interim Hx:   Mr Stephens returns for follow-up. Recent extended holter noted PVC burden increased to 14%. He reports he has begun feeling worse again over the past few months. Reports he felt amazing for a few months after the first ablation when PVC burden was low.    My interpretation of today's in-clinic ECG is sinus rhythm with PVCs (narrow QRS, LB, V2 transition, superior leftward axis)    Review of Systems   Constitutional: Negative for fever and  malaise/fatigue.   HENT:  Negative for congestion and sore throat.    Eyes:  Negative for blurred vision and visual disturbance.   Cardiovascular:  Negative for chest pain, dyspnea on exertion, irregular heartbeat, near-syncope, palpitations and syncope.   Respiratory:  Negative for cough and shortness of breath.    Hematologic/Lymphatic: Negative for bleeding problem. Does not bruise/bleed easily.   Skin: Negative.    Musculoskeletal: Negative.    Gastrointestinal:  Negative for bloating, abdominal pain, hematochezia and melena.   Neurological:  Negative for focal weakness and weakness.   Psychiatric/Behavioral: Negative.         Objective:   Physical Exam  Constitutional:       Appearance: Normal appearance.   Neurological:      Mental Status: He is alert and oriented to person, place, and time. Mental status is at baseline.   Psychiatric:         Mood and Affect: Mood normal.         Behavior: Behavior normal.         Thought Content: Thought content normal.         Judgment: Judgment normal.         Assessment:      1. Premature ventricular contractions (PVCs) (VPCs)    2. Ischemic cardiomyopathy    3. Inferior MI    4. Essential hypertension    5. Heart failure, systolic, chronic        Plan:     In summary, Mr. Stephens is a pleasant 39 year-old man, patient of Dr. Kirk Hilton and Dr. Marshall Cevallos, with ischemic CMP with LVEF of 45%, inferior MI in 2018 s/p RCA PCI followed by an NSTEMI in 2019 s/p PCI to D1, and frequent PVCs s/p mapping/ablation of the predominant PVC focus at the LV inferobasal septum. PVC burden reduced to initially to 4% with symptomatic benefit but now back up to 14% with worsening symptoms. Discussed tolerating PVCs versus redo-mapping and ablation. Discussed particular risk of AV block. I spent about a half hour discussing the nature of EP study and ablation, including possible retrograde aortic access. We discussed risks and benefits at length. Our discussion included, but was  not limited to the risk of death, infection, bleeding, stroke, MI, cardiac perforation, vascular injury including aortic dissection, valvular injury, embolism, cardiac tamponade, skin burns, and other organic injury including the possibility for need for surgery or pacemaker implantation. He desire redo-ablation. Discussed standing down if maps close to conduction system. He understood.    Plan  PVC mapping/ablation  JOY  Anesthesia  Hold Metoprolol 5 days prior  Hold entresto AM of procedure      Thank you for allowing me to participate in the care of this patient. Please do not hesitate to call me with any questions or concerns.    Joseph Bernabe MD, PhD  Cardiac Electrophysiology

## 2025-07-03 ENCOUNTER — PATIENT MESSAGE (OUTPATIENT)
Dept: ELECTROPHYSIOLOGY | Facility: CLINIC | Age: 40
End: 2025-07-03
Payer: COMMERCIAL

## 2025-07-29 DIAGNOSIS — R00.2 PALPITATIONS: ICD-10-CM

## 2025-07-29 DIAGNOSIS — I49.9 CARDIAC ARRHYTHMIA, UNSPECIFIED CARDIAC ARRHYTHMIA TYPE: ICD-10-CM

## 2025-07-29 DIAGNOSIS — I49.3 PREMATURE VENTRICULAR CONTRACTIONS (PVCS) (VPCS): Primary | ICD-10-CM

## 2025-07-29 DIAGNOSIS — I25.5 ISCHEMIC CARDIOMYOPATHY: ICD-10-CM

## 2025-09-04 DIAGNOSIS — I10 ESSENTIAL HYPERTENSION: Primary | ICD-10-CM

## 2025-09-04 DIAGNOSIS — I25.5 ISCHEMIC CARDIOMYOPATHY: ICD-10-CM

## 2025-09-04 DIAGNOSIS — I50.42 CHRONIC COMBINED SYSTOLIC AND DIASTOLIC CONGESTIVE HEART FAILURE: ICD-10-CM

## 2025-09-04 DIAGNOSIS — Z01.818 PRE-OP TESTING: ICD-10-CM

## 2025-09-04 DIAGNOSIS — I49.3 FREQUENT PVCS: ICD-10-CM

## (undated) DEVICE — CATH THERMOCOOL SMTCH SF D F

## (undated) DEVICE — KIT CUSTOM MANIFOLD

## (undated) DEVICE — R CATH BIDIRECTIONL DF CRV 7FR

## (undated) DEVICE — INTRODUCER CATH 4F 11CM

## (undated) DEVICE — INFLATOR ENCORE 26 BLLN INFL

## (undated) DEVICE — ELECTRODE REM PLYHSV RETURN 9

## (undated) DEVICE — SEE MEDLINE ITEM 156894

## (undated) DEVICE — KIT CO-PILOT

## (undated) DEVICE — COVER PRB TRNSDUC 7.6X183CM

## (undated) DEVICE — SHEATH INTRODUCER 9FR 11CM

## (undated) DEVICE — INTRO 8.5FR 63CM SRO

## (undated) DEVICE — CATH SOUNDSTAR ECO SMS 8FR

## (undated) DEVICE — SPIKE CONTRAST CONTROLLER

## (undated) DEVICE — CATH AL1 4FR

## (undated) DEVICE — HEMOSTAT VASC BAND REG 24CM

## (undated) DEVICE — R CATH RESPONS QPLR JSN 6F 120

## (undated) DEVICE — WIRE GUIDE SAFE-T-J .035 260CM

## (undated) DEVICE — CATH ALII 4FR INFINITY

## (undated) DEVICE — OMNIPAQUE 350 200ML

## (undated) DEVICE — CATH BLLN FG APEX MR 2.00X12MM

## (undated) DEVICE — INTRODUCER HEMOSTASIS 7.5F

## (undated) DEVICE — KIT MICROINTRO 4F .018X40X7CM

## (undated) DEVICE — SHEATH HEMOSTASIS 8.5FR

## (undated) DEVICE — PATCH CARTO REFERENCE

## (undated) DEVICE — KIT GLIDESHEATH SLEND 6FR 10CM

## (undated) DEVICE — PAD RADI FEMORAL

## (undated) DEVICE — PACK EP DRAPE OMC

## (undated) DEVICE — LINE PRESSURE MONITORING 96IN

## (undated) DEVICE — SET SHTH INTRO SUP ARW-FLX 9FR

## (undated) DEVICE — CATH DXTERITY JL35 100CM 6FR

## (undated) DEVICE — Device

## (undated) DEVICE — PAD DEFIB CADENCE ADULT R2

## (undated) DEVICE — SET SMARTABLATE IRR TUBE

## (undated) DEVICE — SEE MEDLINE ITEM 157187

## (undated) DEVICE — GUIDE LAUNCHER 6FR EBU 3.5

## (undated) DEVICE — GUIDE LAUNCHER 6FR EBU 3.0

## (undated) DEVICE — KIT PROBE COVER WITH GEL

## (undated) DEVICE — GUIDEWIRE EMERALD 150CM PTFE

## (undated) DEVICE — CATH JACKY RADIAL 5FR 100CM

## (undated) DEVICE — GUIDE WIRE BMW 014 X190

## (undated) DEVICE — DEVICE PERCLOSE SUT CLSR 6FR

## (undated) DEVICE — INTRODUCER HEMOSTASIS 6.5FR